# Patient Record
Sex: FEMALE | Race: WHITE | URBAN - METROPOLITAN AREA
[De-identification: names, ages, dates, MRNs, and addresses within clinical notes are randomized per-mention and may not be internally consistent; named-entity substitution may affect disease eponyms.]

---

## 2019-01-22 NOTE — PROGRESS NOTES
GISELA Che Kidney is a 63y.o. year old female patient of No primary care provider on file. who presents with c/o est care. Pt has history of has Diabetes ([address] HTN (hypertension), and Hypercholesteremia on their problem list..    Was previously following with provider in [place]. Diabetic ROS   Current mediation regimen:  medication compliance/ADR: metformin 1000 BID, glipizide 5mg BID, has been on this regimen since dx in 2010  home glucose monitoring: BS runs in 120s fasting, checks 3 times a week. diabetic ROS: Denies lightheadedness, feeling jittery or irritable. Denies numbness/tingling in extremities. Concerned about risk for dementia with metformin. HTN  Home BP: runs 163K systolic  Medication regimen/ADR/missed doses: carvdeilol 3.125 BID, was on lisinopril and HCTZ in the past    Hyperlipidemia   Patient has history of hyperlipidemia that is being managed with Crestor 10mg. She has been taking her statin cholesterol medication regularly without side effects such as myalgias or upper abdominal pain, nausea or jaundice. Went through period of depression with daughter who has bipolar and passing of her father in last few years. Denies feeling down or depressed. Denies SI/HI. Lifestyle  Diet: Working on getting diet back on track, gained some weight during depression. Tries to eat fruits, fresh veggies, whole grains, lots of water, no soda or gatorade, no sugar in drinks. Fried and salty foods every now and then. Exercise: started working out again, does stuff at home, goal is 5 days a week   ETOH: Denies  Nicotine: Denies  Contraception:      Denies chest pain, chest pressure, or palpitations. Denies SOB, orthopnea, or PND. Denies lower extremity swelling. Denies HA, dizziness, or blurred vision. Denies N/V/D, constipation, heartburn, or abd pain. Denies BRBPR, melena, or blood in urine. Denies difficulty sleeping.             Patient Active Problem List   Diagnosis Code  Diabetes ([facility] E11.9    HTN (hypertension) I10    Hypercholesteremia E78.00     Past Medical History:   Diagnosis Date    Diabetes ([facility]     HTN (hypertension)     Hypercholesteremia      No past surgical history on file. Social History     Socioeconomic History    Marital status:      Spouse name: Not on file    Number of children: Not on file    Years of education: Not on file    Highest education level: Not on file   Tobacco Use    Smoking status: Former Smoker     Packs/day: 1.00     Types: Cigarettes     Last attempt to quit:      Years since quittin.0    Smokeless tobacco: Never Used   Substance and Sexual Activity    Alcohol use: No     Frequency: Never     Comment: quit in [facility] Drug use: No    Sexual activity: Yes     Comment: menopausal   Social History Narrative    Works at [employer].  and lives with spouse. Family History   Problem Relation Age of Onset    Hypertension Mother     Cancer Father         lung    Heart Disease Father     Hypertension Father     Parkinson's Disease Father     Dementia Father     Bipolar Disorder Other         daughter     No Known Allergies    MEDICATIONS  Current Outpatient Medications   Medication Sig    multivitamin (ONE A DAY) tablet Take 1 Tab by mouth daily.  metFORMIN (GLUCOPHAGE) 1,000 mg tablet Take 1,000 mg by mouth two (2) times daily (with meals).  carvedilol (COREG) 3.125 mg tablet Take  by mouth two (2) times daily (with meals).  rosuvastatin (CRESTOR) 10 mg tablet Take 10 mg by mouth nightly.  glipiZIDE (GLUCOTROL) 5 mg tablet Take  by mouth two (2) times a day. No current facility-administered medications for this visit.         REVIEW OF SYSTEMS  Per HPI        Visit Vitals  /80   Pulse 87   Temp 98.1 °F (36.7 °C) (Oral)   Resp 18   Ht 5' 8\" (1.727 m)   Wt 321 lb 3.2 oz (145.7 kg)   LMP  (LMP Unknown)   SpO2 96%   BMI 48.84 kg/m²         General: Well-developed, well-nourished. In no distress. A&O x 3. Head: Normocephalic, atraumatic. Eyes: Conjunctiva clear. Pupils equal, round, reactive to light. Extraocular movements intact. Ears/Nose: Nares normal. Septum midline. Normal nasal mucosa. No drainage or sinus tenderness. Mouth/Throat: Lips, mucosa, and tongue normal. Oropharynx benign. Neck: Supple, symmetrical, trachea midline, no lymphadenopathy, no carotid bruits, no JVD, thyroid: not enlarged, symmetric, no tenderness/mass/nodules. Lungs: Clear to auscultation bilaterally. No crackles or wheezes. No use of accessory muscles. Speaks in full sentences without SOB. Chest Wall: No tenderness or deformity. Heart: RRR, normal S1 and S2, no murmur, click, rub, or gallop. Skin: No rashes or lesions. Neurovasc: No edema appreciated. Dorsalis pedis pulses are 2+ on the right side, and 2+ on the left side. Posterior tibial pulses are 2+ on the right side, and 2+ on the left side. Musculoskeletal: Gait normal.   Psychiatric: Normal mood and affect. Behavior is normal.       ASSESSMENT and PLAN  Diagnoses and all orders for this visit:    1. Establishing care with new doctor, encounter for  -     TSH RFX ON ABNORMAL TO FREE T4    2. Type 2 diabetes mellitus with complication, without long-term current use of insulin (HCC)  -     HEMOGLOBIN A1C WITH EAG  -     metFORMIN (GLUCOPHAGE) 1,000 mg tablet; Take 1 Tab by mouth two (2) times daily (with meals). -     glipiZIDE (GLUCOTROL) 5 mg tablet; Take 1 Tab by mouth two (2) times a day. 3. Essential hypertension  -     METABOLIC PANEL, COMPREHENSIVE  -     lisinopril (PRINIVIL, ZESTRIL) 10 mg tablet; Take 1 Tab by mouth daily. -     carvedilol (COREG) 3.125 mg tablet; Take 1 Tab by mouth two (2) times daily (with meals). -HTN uncontrolled, restart lisinopril, CMP today, re-eval BP in 1 month, encouraged pt to take home BP- call if abnormally low or high    4.  Hypercholesteremia  -     LIPID PANEL  -     rosuvastatin (CRESTOR) 10 mg tablet; Take 1 Tab by mouth nightly. 5. Class 3 severe obesity without serious comorbidity with body mass index (BMI) of 45.0 to 49.9 in adult, unspecified obesity type ([address]  Discussed imp of routine exercise and health diet. Pt is motivated. Plans to workout 5 days a week at home. 6. Screening for breast cancer  -     TERENCE MAMMO BI SCREENING INCL CAD; Future    7. Need for hepatitis C screening test  -     HEPATITIS C AB, RFLX TO QT BY PCR    8. Encounter for immunization  -     INFLUENZA VIRUS VAC QUAD,SPLIT,PRESV FREE SYRINGE IM            Patient Instructions     Please bring blood pressure and blood sugar logs to next appt. Vaccine Information Statement    Influenza (Flu) Vaccine (Inactivated or Recombinant): What you need to know    Many Vaccine Information Statements are available in [place] and other languages. See www.immunize.org/vis  Hojas de Información Sobre Vacunas están disponibles en Español y en muchos otros idiomas. Visite www.immunize.org/vis    1. Why get vaccinated? Influenza (flu) is a contagious disease that spreads around the [facility] every year, usually between October and May. Flu is caused by influenza viruses, and is spread mainly by coughing, sneezing, and close contact. Anyone can get flu. Flu strikes suddenly and can last several days. Symptoms vary by age, but can include:   fever/chills   sore throat   muscle aches   fatigue   cough   headache    runny or stuffy nose    Flu can also lead to pneumonia and blood infections, and cause diarrhea and seizures in children. If you have a medical condition, such as heart or lung disease, flu can make it worse. Flu is more dangerous for some people. Infants and young children, people 72years of age and older, pregnant women, and people with certain health conditions or a weakened immune system are at greatest risk.       Each year thousands of people in the [facility] [place] die from flu, and many more are hospitalized. Flu vaccine can:   keep you from getting flu,   make flu less severe if you do get it, and   keep you from spreading flu to your family and other people. 2. Inactivated and recombinant flu vaccines    A dose of flu vaccine is recommended every flu season. Children 6 months through 6years of age may need two doses during the same flu season. Everyone else needs only one dose each flu season. Some inactivated flu vaccines contain a very small amount of a mercury-based preservative called thimerosal. Studies have not shown thimerosal in vaccines to be harmful, but flu vaccines that do not contain thimerosal are available. There is no live flu virus in flu shots. They cannot cause the flu. There are many flu viruses, and they are always changing. Each year a new flu vaccine is made to protect against three or four viruses that are likely to cause disease in the upcoming flu season. But even when the vaccine doesnt exactly match these viruses, it may still provide some protection    Flu vaccine cannot prevent:   flu that is caused by a virus not covered by the vaccine, or   illnesses that look like flu but are not. It takes about 2 weeks for protection to develop after vaccination, and protection lasts through the flu season. 3. Some people should not get this vaccine    Tell the person who is giving you the vaccine:     If you have any severe, life-threatening allergies. If you ever had a life-threatening allergic reaction after a dose of flu vaccine, or have a severe allergy to any part of this vaccine, you may be advised not to get vaccinated. Most, but not all, types of flu vaccine contain a small amount of egg protein.  If you ever had Guillain-Barré Syndrome (also called GBS). Some people with a history of GBS should not get this vaccine. This should be discussed with your doctor.      If you are not feeling well. It is usually okay to get flu vaccine when you have a mild illness, but you might be asked to come back when you feel better. 4. Risks of a vaccine reaction    With any medicine, including vaccines, there is a chance of reactions. These are usually mild and go away on their own, but serious reactions are also possible. Most people who get a flu shot do not have any problems with it. Minor problems following a flu shot include:    soreness, redness, or swelling where the shot was given     hoarseness   sore, red or itchy eyes   cough   fever   aches   headache   itching   fatigue  If these problems occur, they usually begin soon after the shot and last 1 or 2 days. More serious problems following a flu shot can include the following:     There may be a small increased risk of Guillain-Barré Syndrome (GBS) after inactivated flu vaccine. This risk has been estimated at 1 or 2 additional cases per million people vaccinated. This is much lower than the risk of severe complications from flu, which can be prevented by flu vaccine.  Young children who get the flu shot along with pneumococcal vaccine (PCV13) and/or DTaP vaccine at the same time might be slightly more likely to have a seizure caused by fever. Ask your doctor for more information. Tell your doctor if a child who is getting flu vaccine has ever had a seizure. Problems that could happen after any injected vaccine:      People sometimes faint after a medical procedure, including vaccination. Sitting or lying down for about 15 minutes can help prevent fainting, and injuries caused by a fall. Tell your doctor if you feel dizzy, or have vision changes or ringing in the ears.  Some people get severe pain in the shoulder and have difficulty moving the arm where a shot was given. This happens very rarely.  Any medication can cause a severe allergic reaction.  Such reactions from a vaccine are very rare, estimated at about 1 in a million doses, and would happen within a few minutes to a few hours after the vaccination. As with any medicine, there is a very remote chance of a vaccine causing a serious injury or death. The safety of vaccines is always being monitored. For more information, visit: www.cdc.gov/vaccinesafety/    5. What if there is a serious reaction? What should I look for?  Look for anything that concerns you, such as signs of a severe allergic reaction, very high fever, or unusual behavior. Signs of a severe allergic reaction can include hives, swelling of the face and throat, difficulty breathing, a fast heartbeat, dizziness, and weakness - usually within a few minutes to a few hours after the vaccination. What should I do?  If you think it is a severe allergic reaction or other emergency that cant wait, call 9-1-1 and get the person to the nearest hospital. Otherwise, call your doctor.  Reactions should be reported to the Vaccine Adverse Event Reporting System (VAERS). Your doctor should file this report, or you can do it yourself through  the VAERS web site at www.vaers. [facility].gov, or by calling 3-340.470.6652. VAERS does not give medical advice. 6. The National Vaccine Injury Compensation Program    The [facility] Vaccine Injury Compensation Program (VICP) is a federal program that was created to compensate people who may have been injured by certain vaccines. Persons who believe they may have been injured by a vaccine can learn about the program and about filing a claim by calling 4-220.722.9150 or visiting the [employer] website at www.[facility].gov/vaccinecompensation. There is a time limit to file a claim for compensation. 7. How can I learn more?  Ask your healthcare provider. He or she can give you the vaccine package insert or suggest other sources of information.  Call your local or state health department.    Contact the Centers for Disease Control and Prevention (CDC):  - Call 8-135.594.8083 (1-800-CDC-INFO) or  - Visit CDCs website at www.cdc.gov/flu    Vaccine Information Statement   Inactivated Influenza Vaccine   8/7/2015  Steve Melendez [id number]    Department of Health and Human Services  Centers for Disease Control and Prevention    Office Use Only       Learning About Diabetes Food Guidelines  Your Care Instructions    Meal planning is important to manage diabetes. It helps keep your blood sugar at a target level (which you set with your doctor). You don't have to eat special foods. You can eat what your family eats, including sweets once in a while. But you do have to pay attention to how often you eat and how much you eat of certain foods. You may want to work with a dietitian or a certified diabetes educator (CDE) to help you plan meals and snacks. A dietitian or CDE can also help you lose weight if that is one of your goals. What should you know about eating carbs? Managing the amount of carbohydrate (carbs) you eat is an important part of healthy meals when you have diabetes. Carbohydrate is found in many foods. · Learn which foods have carbs. And learn the amounts of carbs in different foods. ? Bread, cereal, pasta, and rice have about 15 grams of carbs in a serving. A serving is 1 slice of bread (1 ounce), ½ cup of cooked cereal, or 1/3 cup of cooked pasta or rice. ? Fruits have 15 grams of carbs in a serving. A serving is 1 small fresh fruit, such as an apple or orange; ½ of a banana; ½ cup of cooked or canned fruit; ½ cup of fruit juice; 1 cup of melon or raspberries; or 2 tablespoons of dried fruit. ? Milk and no-sugar-added yogurt have 15 grams of carbs in a serving. A serving is 1 cup of milk or 2/3 cup of no-sugar-added yogurt. ? Starchy vegetables have 15 grams of carbs in a serving.  A serving is ½ cup of mashed potatoes or sweet potato; 1 cup winter squash; ½ of a small baked potato; ½ cup of cooked beans; or ½ cup cooked corn or green peas.  · Learn how much carbs to eat each day and at each meal. A dietitian or CDE can teach you how to keep track of the amount of carbs you eat. This is called carbohydrate counting. · If you are not sure how to count carbohydrate grams, use the Plate Method to plan meals. It is a good, quick way to make sure that you have a balanced meal. It also helps you spread carbs throughout the day. ? Divide your plate by types of foods. Put non-starchy vegetables on half the plate, meat or other protein food on one-quarter of the plate, and a grain or starchy vegetable in the final quarter of the plate. To this you can add a small piece of fruit and 1 cup of milk or yogurt, depending on how many carbs you are supposed to eat at a meal.  · Try to eat about the same amount of carbs at each meal. Do not \"save up\" your daily allowance of carbs to eat at one meal.  · Proteins have very little or no carbs per serving. Examples of proteins are beef, chicken, turkey, fish, eggs, tofu, cheese, cottage cheese, and peanut butter. A serving size of meat is 3 ounces, which is about the size of a deck of cards. Examples of meat substitute serving sizes (equal to 1 ounce of meat) are 1/4 cup of cottage cheese, 1 egg, 1 tablespoon of peanut butter, and ½ cup of tofu. How can you eat out and still eat healthy? · Learn to estimate the serving sizes of foods that have carbohydrate. If you measure food at home, it will be easier to estimate the amount in a serving of restaurant food. · If the meal you order has too much carbohydrate (such as potatoes, corn, or baked beans), ask to have a low-carbohydrate food instead. Ask for a salad or green vegetables. · If you use insulin, check your blood sugar before and after eating out to help you plan how much to eat in the future. · If you eat more carbohydrate at a meal than you had planned, take a walk or do other exercise. This will help lower your blood sugar.   What else should you know?  · Limit saturated fat, such as the fat from meat and dairy products. This is a healthy choice because people who have diabetes are at higher risk of heart disease. So choose lean cuts of meat and nonfat or low-fat dairy products. Use olive or canola oil instead of butter or shortening when cooking. · Don't skip meals. Your blood sugar may drop too low if you skip meals and take insulin or certain medicines for diabetes. · Check with your doctor before you drink alcohol. Alcohol can cause your blood sugar to drop too low. Alcohol can also cause a bad reaction if you take certain diabetes medicines. Follow-up care is a key part of your treatment and safety. Be sure to make and go to all appointments, and call your doctor if you are having problems. It's also a good idea to know your test results and keep a list of the medicines you take. Where can you learn more? Go to http://niles-zach.info/. Enter G672 in the search box to learn more about \"Learning About Diabetes Food Guidelines. \"  Current as of: July 25, 2018  Content Version: 11.9  © 4780-7153 [employer]. Care instructions adapted under license by [employer] (which disclaims liability or warranty for this information). If you have questions about a medical condition or this instruction, always ask your healthcare professional. [address] any warranty or liability for your use of this information. High Blood Pressure: Care Instructions  Overview    It's normal for blood pressure to go up and down throughout the day. But if it stays up, you have high blood pressure. Another name for high blood pressure is hypertension. Despite what a lot of people think, high blood pressure usually doesn't cause headaches or make you feel dizzy or lightheaded. It usually has no symptoms. But it does increase your risk of stroke, heart attack, and other problems.  You and your doctor will talk about your risks of these problems based on your blood pressure. Your doctor will give you a goal for your blood pressure. Your goal will be based on your health and your age. Lifestyle changes, such as eating healthy and being active, are always important to help lower blood pressure. You might also take medicine to reach your blood pressure goal.  Follow-up care is a key part of your treatment and safety. Be sure to make and go to all appointments, and call your doctor if you are having problems. It's also a good idea to know your test results and keep a list of the medicines you take. How can you care for yourself at home? Medical treatment  · If you stop taking your medicine, your blood pressure will go back up. You may take one or more types of medicine to lower your blood pressure. Be safe with medicines. Take your medicine exactly as prescribed. Call your doctor if you think you are having a problem with your medicine. · Talk to your doctor before you start taking aspirin every day. Aspirin can help certain people lower their risk of a heart attack or stroke. But taking aspirin isn't right for everyone, because it can cause serious bleeding. · See your doctor regularly. You may need to see the doctor more often at first or until your blood pressure comes down. · If you are taking blood pressure medicine, talk to your doctor before you take decongestants or anti-inflammatory medicine, such as ibuprofen. Some of these medicines can raise blood pressure. · Learn how to check your blood pressure at home. Lifestyle changes  · Stay at a healthy weight. This is especially important if you put on weight around the waist. Losing even 10 pounds can help you lower your blood pressure. · If your doctor recommends it, get more exercise. Walking is a good choice. Bit by bit, increase the amount you walk every day. Try for at least 30 minutes on most days of the week.  You also may want to swim, bike, or do other activities. · Avoid or limit alcohol. Talk to your doctor about whether you can drink any alcohol. · Try to limit how much sodium you eat to less than 2,300 milligrams (mg) a day. Your doctor may ask you to try to eat less than 1,500 mg a day. · Eat plenty of fruits (such as bananas and oranges), vegetables, legumes, whole grains, and low-fat dairy products. · Lower the amount of saturated fat in your diet. Saturated fat is found in animal products such as milk, cheese, and meat. Limiting these foods may help you lose weight and also lower your risk for heart disease. · Do not smoke. Smoking increases your risk for heart attack and stroke. If you need help quitting, talk to your doctor about stop-smoking programs and medicines. These can increase your chances of quitting for good. When should you call for help? Call 911 anytime you think you may need emergency care. This may mean having symptoms that suggest that your blood pressure is causing a serious heart or blood vessel problem. Your blood pressure may be over 180/120.   For example, call 911 if:    · You have symptoms of a heart attack. These may include:  ? Chest pain or pressure, or a strange feeling in the chest.  ? Sweating. ? Shortness of breath. ? Nausea or vomiting. ? Pain, pressure, or a strange feeling in the back, neck, jaw, or upper belly or in one or both shoulders or arms. ? Lightheadedness or sudden weakness. ? A fast or irregular heartbeat.     · You have symptoms of a stroke. These may include:  ? Sudden numbness, tingling, weakness, or loss of movement in your face, arm, or leg, especially on only one side of your body. ? Sudden vision changes. ? Sudden trouble speaking. ? Sudden confusion or trouble understanding simple statements. ? Sudden problems with walking or balance.   ? A sudden, severe headache that is different from past headaches.     · You have severe back or belly pain.    Do not wait until your blood pressure comes down on its own. Get help right away.   Call your doctor now or seek immediate care if:    · Your blood pressure is much higher than normal (such as 180/120 or higher), but you don't have symptoms.     · You think high blood pressure is causing symptoms, such as:  ? Severe headache.  ? Blurry vision.    Watch closely for changes in your health, and be sure to contact your doctor if:    · Your blood pressure measures higher than your doctor recommends at least 2 times. That means the top number is higher or the bottom number is higher, or both.     · You think you may be having side effects from your blood pressure medicine. Where can you learn more? Go to http://niles-zach.info/. Enter L607 in the search box to learn more about \"High Blood Pressure: Care Instructions. \"  Current as of: July 22, 2018  Content Version: 11.9  © 3523-5709 [employer]. Care instructions adapted under license by [employer] (which disclaims liability or warranty for this information). If you have questions about a medical condition or this instruction, always ask your healthcare professional. [place] [id number] any warranty or liability for your use of this information. Follow-up Disposition:  Return in about 1 month (around 2/25/2019), or if symptoms worsen or fail to improve, for HTN, DM. Health Maintenance Due   Topic Date Due    Hepatitis C Screening  1960    DTaP/Tdap/Td series (1 - Tdap) 02/09/1982    PAP AKA CERVICAL CYTOLOGY  02/09/1982    Shingrix Vaccine Age 50> (1 of 2) 02/09/2011    BREAST CANCER SCRN MAMMOGRAM  02/09/2011    FOBT Q 1 YEAR AGE 50-75  02/09/2011    Influenza Age 9 to Adult  08/01/2018   Flu shot today. Last mammogram 1999. Last colonoscopy 2 years ago in [facility]. Last PAP 2012 Dr. Anthony Chew in [facility]. Pt to schedule f/u with me for PAP only.       I have discussed the diagnosis with the patient and the intended plan as seen in the above orders. Patient is in agreement. The patient has received an after-visit summary and questions were answered concerning future plans. I have discussed medication side effects and warnings with the patient as well. Warning signs for the above conditions were discussed including when to call our office or go to the emergency room. The nurse provided the patient and/or family with advanced directive information if needed and encouraged the patient to provide a copy to the office when available.

## 2019-01-25 ENCOUNTER — OFFICE VISIT (OUTPATIENT)
Dept: INTERNAL MEDICINE CLINIC | Facility: CLINIC | Age: 59
End: 2019-01-25

## 2019-01-25 VITALS
WEIGHT: 293 LBS | TEMPERATURE: 98.1 F | RESPIRATION RATE: 18 BRPM | BODY MASS INDEX: 44.41 KG/M2 | SYSTOLIC BLOOD PRESSURE: 150 MMHG | HEART RATE: 87 BPM | DIASTOLIC BLOOD PRESSURE: 80 MMHG | HEIGHT: 68 IN | OXYGEN SATURATION: 96 %

## 2019-01-25 DIAGNOSIS — E66.01 CLASS 3 SEVERE OBESITY WITHOUT SERIOUS COMORBIDITY WITH BODY MASS INDEX (BMI) OF 45.0 TO 49.9 IN ADULT, UNSPECIFIED OBESITY TYPE (HCC): ICD-10-CM

## 2019-01-25 DIAGNOSIS — E78.00 HYPERCHOLESTEREMIA: ICD-10-CM

## 2019-01-25 DIAGNOSIS — Z23 ENCOUNTER FOR IMMUNIZATION: ICD-10-CM

## 2019-01-25 DIAGNOSIS — Z11.59 NEED FOR HEPATITIS C SCREENING TEST: ICD-10-CM

## 2019-01-25 DIAGNOSIS — Z12.39 SCREENING FOR BREAST CANCER: ICD-10-CM

## 2019-01-25 DIAGNOSIS — Z76.89 ESTABLISHING CARE WITH NEW DOCTOR, ENCOUNTER FOR: Primary | ICD-10-CM

## 2019-01-25 DIAGNOSIS — I10 ESSENTIAL HYPERTENSION: ICD-10-CM

## 2019-01-25 DIAGNOSIS — E11.8 TYPE 2 DIABETES MELLITUS WITH COMPLICATION, WITHOUT LONG-TERM CURRENT USE OF INSULIN (HCC): ICD-10-CM

## 2019-01-25 RX ORDER — METFORMIN HYDROCHLORIDE 1000 MG/1
1000 TABLET ORAL 2 TIMES DAILY WITH MEALS
Qty: 180 TAB | Refills: 5 | Status: SHIPPED | OUTPATIENT
Start: 2019-01-25 | End: 2020-04-20

## 2019-01-25 RX ORDER — LISINOPRIL 10 MG/1
10 TABLET ORAL DAILY
Qty: 90 TAB | Refills: 2 | Status: SHIPPED | OUTPATIENT
Start: 2019-01-25 | End: 2019-04-25 | Stop reason: SDUPTHER

## 2019-01-25 RX ORDER — CARVEDILOL 3.12 MG/1
3.12 TABLET ORAL 2 TIMES DAILY WITH MEALS
Qty: 180 TAB | Refills: 5 | Status: SHIPPED | OUTPATIENT
Start: 2019-01-25 | End: 2020-02-10

## 2019-01-25 RX ORDER — GLIPIZIDE 5 MG/1
5 TABLET ORAL 2 TIMES DAILY
Qty: 180 TAB | Refills: 5 | Status: SHIPPED | OUTPATIENT
Start: 2019-01-25 | End: 2020-03-02

## 2019-01-25 RX ORDER — GLIPIZIDE 5 MG/1
TABLET ORAL 2 TIMES DAILY
COMMUNITY
End: 2019-01-25 | Stop reason: SDUPTHER

## 2019-01-25 RX ORDER — METFORMIN HYDROCHLORIDE 1000 MG/1
1000 TABLET ORAL 2 TIMES DAILY WITH MEALS
COMMUNITY
End: 2019-01-25 | Stop reason: SDUPTHER

## 2019-01-25 RX ORDER — ROSUVASTATIN CALCIUM 10 MG/1
10 TABLET, COATED ORAL
Qty: 90 TAB | Refills: 5 | Status: SHIPPED | OUTPATIENT
Start: 2019-01-25 | End: 2020-04-20 | Stop reason: DRUGHIGH

## 2019-01-25 RX ORDER — BISMUTH SUBSALICYLATE 262 MG
1 TABLET,CHEWABLE ORAL DAILY
COMMUNITY
End: 2021-08-03

## 2019-01-25 RX ORDER — CARVEDILOL 3.12 MG/1
TABLET ORAL 2 TIMES DAILY WITH MEALS
COMMUNITY
End: 2019-01-25 | Stop reason: SDUPTHER

## 2019-01-25 RX ORDER — ROSUVASTATIN CALCIUM 10 MG/1
10 TABLET, COATED ORAL
COMMUNITY
End: 2019-01-25 | Stop reason: SDUPTHER

## 2019-01-25 NOTE — PROGRESS NOTES
Renetta Loja  Identified pt with two pt identifiers(name and ). Chief Complaint   Patient presents with    Establish Care    Medication Evaluation    Immunization/Injection       Reviewed record In preparation for visit and have obtained necessary documentation. Given info on advanced directives. 1. Have you been to the ER, urgent care clinic or hospitalized since your last visit? No     2. Have you seen or consulted any other health care providers outside of the [address] [place] [address] since your last visit? Include any pap smears or colon screening. No    Vitals reviewed with provider. Health Maintenance reviewed: After verbal order read back of MOLLY Pierson, patient received Flu Shot in left arm,  [address]  [id number] Lot  Exp 19. Patient tolerated procedure without complaints and received VIS. Health Maintenance Due   Topic    Hepatitis C Screening     DTaP/Tdap/Td series (1 - Tdap)    PAP AKA CERVICAL CYTOLOGY     Shingrix Vaccine Age 50> (1 of 2)    BREAST CANCER SCRN MAMMOGRAM     FOBT Q 1 YEAR AGE 50-75     Influenza Age 5 to Adult           Wt Readings from Last 3 Encounters:   19 321 lb 3.2 oz (145.7 kg)        Temp Readings from Last 3 Encounters:   19 98.1 °F (36.7 °C) (Oral)        BP Readings from Last 3 Encounters:   19 176/85        Pulse Readings from Last 3 Encounters:   19 87        Vitals:    19 1037   BP: 176/85   Pulse: 87   Resp: 18   Temp: 98.1 °F (36.7 °C)   TempSrc: Oral   SpO2: 96%   Weight: 321 lb 3.2 oz (145.7 kg)   Height: 5' 8\" (1.727 m)   PainSc:   0 - No pain          Learning Assessment:   :     No flowsheet data found. Depression Screening:   :       PHQ over the last two weeks 2019   Little interest or pleasure in doing things Not at all   Feeling down, depressed, irritable, or hopeless Not at all   Total Score PHQ 2 0        Fall Risk Assessment:   :     No flowsheet data found.      Abuse Screening:   : No flowsheet data found. ADL Screening:   :     No flowsheet data found.

## 2019-01-25 NOTE — PATIENT INSTRUCTIONS
Please bring blood pressure and blood sugar logs to next appt. Vaccine Information Statement    Influenza (Flu) Vaccine (Inactivated or Recombinant): What you need to know    Many Vaccine Information Statements are available in Kyr[place] and other languages. See www.immunize.org/vis  Hojas de Información Sobre Vacunas están disponibles en Español y en muchos otros idiomas. Visite www.immunize.org/vis    1. Why get vaccinated? Influenza (flu) is a contagious disease that spreads around the [facility] every year, usually between October and May. Flu is caused by influenza viruses, and is spread mainly by coughing, sneezing, and close contact. Anyone can get flu. Flu strikes suddenly and can last several days. Symptoms vary by age, but can include:   fever/chills   sore throat   muscle aches   fatigue   cough   headache    runny or stuffy nose    Flu can also lead to pneumonia and blood infections, and cause diarrhea and seizures in children. If you have a medical condition, such as heart or lung disease, flu can make it worse. Flu is more dangerous for some people. Infants and young children, people 72years of age and older, pregnant women, and people with certain health conditions or a weakened immune system are at greatest risk. Each year thousands of people in the [facility] die from flu, and many more are hospitalized. Flu vaccine can:   keep you from getting flu,   make flu less severe if you do get it, and   keep you from spreading flu to your family and other people. 2. Inactivated and recombinant flu vaccines    A dose of flu vaccine is recommended every flu season. Children 6 months through 6years of age may need two doses during the same flu season. Everyone else needs only one dose each flu season.        Some inactivated flu vaccines contain a very small amount of a mercury-based preservative called thimerosal. Studies have not shown thimerosal in vaccines to be harmful, but flu vaccines that do not contain thimerosal are available. There is no live flu virus in flu shots. They cannot cause the flu. There are many flu viruses, and they are always changing. Each year a new flu vaccine is made to protect against three or four viruses that are likely to cause disease in the upcoming flu season. But even when the vaccine doesnt exactly match these viruses, it may still provide some protection    Flu vaccine cannot prevent:   flu that is caused by a virus not covered by the vaccine, or   illnesses that look like flu but are not. It takes about 2 weeks for protection to develop after vaccination, and protection lasts through the flu season. 3. Some people should not get this vaccine    Tell the person who is giving you the vaccine:     If you have any severe, life-threatening allergies. If you ever had a life-threatening allergic reaction after a dose of flu vaccine, or have a severe allergy to any part of this vaccine, you may be advised not to get vaccinated. Most, but not all, types of flu vaccine contain a small amount of egg protein.  If you ever had Guillain-Barré Syndrome (also called GBS). Some people with a history of GBS should not get this vaccine. This should be discussed with your doctor.  If you are not feeling well. It is usually okay to get flu vaccine when you have a mild illness, but you might be asked to come back when you feel better. 4. Risks of a vaccine reaction    With any medicine, including vaccines, there is a chance of reactions. These are usually mild and go away on their own, but serious reactions are also possible. Most people who get a flu shot do not have any problems with it.      Minor problems following a flu shot include:    soreness, redness, or swelling where the shot was given     hoarseness   sore, red or itchy eyes   cough   fever   aches   headache   itching   fatigue  If these problems occur, they usually begin soon after the shot and last 1 or 2 days. More serious problems following a flu shot can include the following:     There may be a small increased risk of Guillain-Barré Syndrome (GBS) after inactivated flu vaccine. This risk has been estimated at 1 or 2 additional cases per million people vaccinated. This is much lower than the risk of severe complications from flu, which can be prevented by flu vaccine.  Young children who get the flu shot along with pneumococcal vaccine (PCV13) and/or DTaP vaccine at the same time might be slightly more likely to have a seizure caused by fever. Ask your doctor for more information. Tell your doctor if a child who is getting flu vaccine has ever had a seizure. Problems that could happen after any injected vaccine:      People sometimes faint after a medical procedure, including vaccination. Sitting or lying down for about 15 minutes can help prevent fainting, and injuries caused by a fall. Tell your doctor if you feel dizzy, or have vision changes or ringing in the ears.  Some people get severe pain in the shoulder and have difficulty moving the arm where a shot was given. This happens very rarely.  Any medication can cause a severe allergic reaction. Such reactions from a vaccine are very rare, estimated at about 1 in a million doses, and would happen within a few minutes to a few hours after the vaccination. As with any medicine, there is a very remote chance of a vaccine causing a serious injury or death. The safety of vaccines is always being monitored. For more information, visit: www.cdc.gov/vaccinesafety/    5. What if there is a serious reaction? What should I look for?  Look for anything that concerns you, such as signs of a severe allergic reaction, very high fever, or unusual behavior.     Signs of a severe allergic reaction can include hives, swelling of the face and throat, difficulty breathing, a fast heartbeat, dizziness, and weakness - usually within a few minutes to a few hours after the vaccination. What should I do?  If you think it is a severe allergic reaction or other emergency that cant wait, call 9-1-1 and get the person to the nearest hospital. Otherwise, call your doctor.  Reactions should be reported to the Vaccine Adverse Event Reporting System (VAERS). Your doctor should file this report, or you can do it yourself through  the VAERS web site at www.vaers. [facility].gov, or by calling 8-908.988.9414. VAERS does not give medical advice. 6. The National Vaccine Injury Compensation Program    The [facility] Vaccine Injury Compensation Program (VICP) is a federal program that was created to compensate people who may have been injured by certain vaccines. Persons who believe they may have been injured by a vaccine can learn about the program and about filing a claim by calling 0-595.316.7255 or visiting the [employer] website at www.[facility].gov/vaccinecompensation. There is a time limit to file a claim for compensation. 7. How can I learn more?  Ask your healthcare provider. He or she can give you the vaccine package insert or suggest other sources of information.  Call your local or state health department.  Contact the Centers for Disease Control and Prevention (CDC):  - Call 2-468.215.3080 (1-800-CDC-INFO) or  - Visit CDCs website at www.cdc.gov/flu    Vaccine Information Statement   Inactivated Influenza Vaccine   8/7/2015  42 MAXI Morales [id number]    Department of Health and Human Services  Centers for Disease Control and Prevention    Office Use Only       Learning About Diabetes Food Guidelines  Your Care Instructions    Meal planning is important to manage diabetes. It helps keep your blood sugar at a target level (which you set with your doctor). You don't have to eat special foods. You can eat what your family eats, including sweets once in a while.  But you do have to pay attention to how often you eat and how much you eat of certain foods. You may want to work with a dietitian or a certified diabetes educator (CDE) to help you plan meals and snacks. A dietitian or CDE can also help you lose weight if that is one of your goals. What should you know about eating carbs? Managing the amount of carbohydrate (carbs) you eat is an important part of healthy meals when you have diabetes. Carbohydrate is found in many foods. · Learn which foods have carbs. And learn the amounts of carbs in different foods. ? Bread, cereal, pasta, and rice have about 15 grams of carbs in a serving. A serving is 1 slice of bread (1 ounce), ½ cup of cooked cereal, or 1/3 cup of cooked pasta or rice. ? Fruits have 15 grams of carbs in a serving. A serving is 1 small fresh fruit, such as an apple or orange; ½ of a banana; ½ cup of cooked or canned fruit; ½ cup of fruit juice; 1 cup of melon or raspberries; or 2 tablespoons of dried fruit. ? Milk and no-sugar-added yogurt have 15 grams of carbs in a serving. A serving is 1 cup of milk or 2/3 cup of no-sugar-added yogurt. ? Starchy vegetables have 15 grams of carbs in a serving. A serving is ½ cup of mashed potatoes or sweet potato; 1 cup winter squash; ½ of a small baked potato; ½ cup of cooked beans; or ½ cup cooked corn or green peas. · Learn how much carbs to eat each day and at each meal. A dietitian or CDE can teach you how to keep track of the amount of carbs you eat. This is called carbohydrate counting. · If you are not sure how to count carbohydrate grams, use the Plate Method to plan meals. It is a good, quick way to make sure that you have a balanced meal. It also helps you spread carbs throughout the day. ? Divide your plate by types of foods. Put non-starchy vegetables on half the plate, meat or other protein food on one-quarter of the plate, and a grain or starchy vegetable in the final quarter of the plate.  To this you can add a small piece of fruit and 1 cup of milk or yogurt, depending on how many carbs you are supposed to eat at a meal.  · Try to eat about the same amount of carbs at each meal. Do not \"save up\" your daily allowance of carbs to eat at one meal.  · Proteins have very little or no carbs per serving. Examples of proteins are beef, chicken, turkey, fish, eggs, tofu, cheese, cottage cheese, and peanut butter. A serving size of meat is 3 ounces, which is about the size of a deck of cards. Examples of meat substitute serving sizes (equal to 1 ounce of meat) are 1/4 cup of cottage cheese, 1 egg, 1 tablespoon of peanut butter, and ½ cup of tofu. How can you eat out and still eat healthy? · Learn to estimate the serving sizes of foods that have carbohydrate. If you measure food at home, it will be easier to estimate the amount in a serving of restaurant food. · If the meal you order has too much carbohydrate (such as potatoes, corn, or baked beans), ask to have a low-carbohydrate food instead. Ask for a salad or green vegetables. · If you use insulin, check your blood sugar before and after eating out to help you plan how much to eat in the future. · If you eat more carbohydrate at a meal than you had planned, take a walk or do other exercise. This will help lower your blood sugar. What else should you know? · Limit saturated fat, such as the fat from meat and dairy products. This is a healthy choice because people who have diabetes are at higher risk of heart disease. So choose lean cuts of meat and nonfat or low-fat dairy products. Use olive or canola oil instead of butter or shortening when cooking. · Don't skip meals. Your blood sugar may drop too low if you skip meals and take insulin or certain medicines for diabetes. · Check with your doctor before you drink alcohol. Alcohol can cause your blood sugar to drop too low. Alcohol can also cause a bad reaction if you take certain diabetes medicines.   Follow-up care is a key part of your treatment and safety. Be sure to make and go to all appointments, and call your doctor if you are having problems. It's also a good idea to know your test results and keep a list of the medicines you take. Where can you learn more? Go to http://niles-zach.info/. Enter L907 in the search box to learn more about \"Learning About Diabetes Food Guidelines. \"  Current as of: July 25, 2018  Content Version: 11.9  © 4036-7984 [employer]. Care instructions adapted under license by [employer] (which disclaims liability or warranty for this information). If you have questions about a medical condition or this instruction, always ask your healthcare professional. [address] any warranty or liability for your use of this information. High Blood Pressure: Care Instructions  Overview    It's normal for blood pressure to go up and down throughout the day. But if it stays up, you have high blood pressure. Another name for high blood pressure is hypertension. Despite what a lot of people think, high blood pressure usually doesn't cause headaches or make you feel dizzy or lightheaded. It usually has no symptoms. But it does increase your risk of stroke, heart attack, and other problems. You and your doctor will talk about your risks of these problems based on your blood pressure. Your doctor will give you a goal for your blood pressure. Your goal will be based on your health and your age. Lifestyle changes, such as eating healthy and being active, are always important to help lower blood pressure. You might also take medicine to reach your blood pressure goal.  Follow-up care is a key part of your treatment and safety. Be sure to make and go to all appointments, and call your doctor if you are having problems. It's also a good idea to know your test results and keep a list of the medicines you take.   How can you care for yourself at home?  Medical treatment  · If you stop taking your medicine, your blood pressure will go back up. You may take one or more types of medicine to lower your blood pressure. Be safe with medicines. Take your medicine exactly as prescribed. Call your doctor if you think you are having a problem with your medicine. · Talk to your doctor before you start taking aspirin every day. Aspirin can help certain people lower their risk of a heart attack or stroke. But taking aspirin isn't right for everyone, because it can cause serious bleeding. · See your doctor regularly. You may need to see the doctor more often at first or until your blood pressure comes down. · If you are taking blood pressure medicine, talk to your doctor before you take decongestants or anti-inflammatory medicine, such as ibuprofen. Some of these medicines can raise blood pressure. · Learn how to check your blood pressure at home. Lifestyle changes  · Stay at a healthy weight. This is especially important if you put on weight around the waist. Losing even 10 pounds can help you lower your blood pressure. · If your doctor recommends it, get more exercise. Walking is a good choice. Bit by bit, increase the amount you walk every day. Try for at least 30 minutes on most days of the week. You also may want to swim, bike, or do other activities. · Avoid or limit alcohol. Talk to your doctor about whether you can drink any alcohol. · Try to limit how much sodium you eat to less than 2,300 milligrams (mg) a day. Your doctor may ask you to try to eat less than 1,500 mg a day. · Eat plenty of fruits (such as bananas and oranges), vegetables, legumes, whole grains, and low-fat dairy products. · Lower the amount of saturated fat in your diet. Saturated fat is found in animal products such as milk, cheese, and meat. Limiting these foods may help you lose weight and also lower your risk for heart disease. · Do not smoke.  Smoking increases your risk for heart attack and stroke. If you need help quitting, talk to your doctor about stop-smoking programs and medicines. These can increase your chances of quitting for good. When should you call for help? Call 911 anytime you think you may need emergency care. This may mean having symptoms that suggest that your blood pressure is causing a serious heart or blood vessel problem. Your blood pressure may be over 180/120.   For example, call 911 if:    · You have symptoms of a heart attack. These may include:  ? Chest pain or pressure, or a strange feeling in the chest.  ? Sweating. ? Shortness of breath. ? Nausea or vomiting. ? Pain, pressure, or a strange feeling in the back, neck, jaw, or upper belly or in one or both shoulders or arms. ? Lightheadedness or sudden weakness. ? A fast or irregular heartbeat.     · You have symptoms of a stroke. These may include:  ? Sudden numbness, tingling, weakness, or loss of movement in your face, arm, or leg, especially on only one side of your body. ? Sudden vision changes. ? Sudden trouble speaking. ? Sudden confusion or trouble understanding simple statements. ? Sudden problems with walking or balance. ? A sudden, severe headache that is different from past headaches.     · You have severe back or belly pain.    Do not wait until your blood pressure comes down on its own. Get help right away.   Call your doctor now or seek immediate care if:    · Your blood pressure is much higher than normal (such as 180/120 or higher), but you don't have symptoms.     · You think high blood pressure is causing symptoms, such as:  ? Severe headache.  ? Blurry vision.    Watch closely for changes in your health, and be sure to contact your doctor if:    · Your blood pressure measures higher than your doctor recommends at least 2 times.  That means the top number is higher or the bottom number is higher, or both.     · You think you may be having side effects from your blood pressure medicine. Where can you learn more? Go to http://niles-zach.info/. Enter R343 in the search box to learn more about \"High Blood Pressure: Care Instructions. \"  Current as of: July 22, 2018  Content Version: 11.9  © 9540-6760 [employer]. Care instructions adapted under license by [employer] (which disclaims liability or warranty for this information). If you have questions about a medical condition or this instruction, always ask your healthcare professional. [address] any warranty or liability for your use of this information.

## 2019-01-26 LAB
ALBUMIN SERPL-MCNC: 4.2 G/DL (ref 3.5–5.5)
ALBUMIN/GLOB SERPL: 1.6 {RATIO} (ref 1.2–2.2)
ALP SERPL-CCNC: 80 IU/L (ref 39–117)
ALT SERPL-CCNC: 14 IU/L (ref 0–32)
AST SERPL-CCNC: 19 IU/L (ref 0–40)
BILIRUB SERPL-MCNC: 0.4 MG/DL (ref 0–1.2)
BUN SERPL-MCNC: 12 MG/DL (ref 6–24)
BUN/CREAT SERPL: 16 (ref 9–23)
CALCIUM SERPL-MCNC: 9.8 MG/DL (ref 8.7–10.2)
CHLORIDE SERPL-SCNC: 98 MMOL/L (ref 96–106)
CHOLEST SERPL-MCNC: 194 MG/DL (ref 100–199)
CO2 SERPL-SCNC: 24 MMOL/L (ref 20–29)
CREAT SERPL-MCNC: 0.73 MG/DL (ref 0.57–1)
EST. AVERAGE GLUCOSE BLD GHB EST-MCNC: 226 MG/DL
GLOBULIN SER CALC-MCNC: 2.7 G/DL (ref 1.5–4.5)
GLUCOSE SERPL-MCNC: 210 MG/DL (ref 65–99)
HBA1C MFR BLD: 9.5 % (ref 4.8–5.6)
HCV AB S/CO SERPL IA: 0.4 S/CO RATIO (ref 0–0.9)
HDLC SERPL-MCNC: 35 MG/DL
LDLC SERPL CALC-MCNC: 117 MG/DL (ref 0–99)
POTASSIUM SERPL-SCNC: 4.4 MMOL/L (ref 3.5–5.2)
PROT SERPL-MCNC: 6.9 G/DL (ref 6–8.5)
SODIUM SERPL-SCNC: 138 MMOL/L (ref 134–144)
TRIGL SERPL-MCNC: 208 MG/DL (ref 0–149)
TSH SERPL DL<=0.005 MIU/L-ACNC: 1.85 UIU/ML (ref 0.45–4.5)
VLDLC SERPL CALC-MCNC: 42 MG/DL (ref 5–40)

## 2019-01-28 ENCOUNTER — TELEPHONE (OUTPATIENT)
Dept: INTERNAL MEDICINE CLINIC | Facility: CLINIC | Age: 59
End: 2019-01-28

## 2019-01-28 DIAGNOSIS — E11.8 TYPE 2 DIABETES MELLITUS WITH COMPLICATION, WITHOUT LONG-TERM CURRENT USE OF INSULIN (HCC): Primary | ICD-10-CM

## 2019-01-28 NOTE — PROGRESS NOTES
Pls let pt know her A1C is 9.5, the goal for diabetes is <7, I would like to add a new medication called Januvia that I will send to her pharmacy. It is a once daily medication. Pls continue to check fasting BS once daily and bring to next appt. Her cholesterol is also elevated and I would like to increase her Crestor to 20mg daily so pls take two of your current pills and I can send an Rx for the 20mg tabs once you run out. Thyroid, kidney and liver function are nml. Hep c screen is negative. Pls continue to work on healthy eating habits including reducing sugars and simple carbohydrates and aiming to get 30 mins of exercise at least 3-4 days a week.

## 2019-04-24 NOTE — PROGRESS NOTES
HPI 
Anali Jacinto is a 63y.o. year old female patient of Maggi Little NP who presents with c/o 3 mo f/u for HTN and DM. Pt has history of has Diabetes ([address] HTN (hypertension), Hypercholesteremia, and Obesity, morbid ([address] on their problem list.. Seen 3 mo ago to [facility] care. Januvia added for uncontrolled DM, and crestor added for XOL. Also c/o left ear pain last few days. Thinks it's allergy related. Sounds like something in there. Has congestion, takes flonase as needed. Denies f/c. Denies cough. HTN Home BP: doesn't check at home. Medication regimen/ADR/missed doses: carvdeilol 3.125 BID, Lisinopril 10mg added back at last appt Denies chest pain, chest pressure, or palpitations. Denies SOB, orthopnea, or PND. Denies HA, dizziness, blurred vision, or swelling. Diabetic ROS Current mediation regimen: 
medication compliance/ADR: metformin 1000 BID, glipizide 5mg BID, Januvia 100 -- Pt unable to take Januvia as it was $400. States prior to seeing me had been out of medication for a few months.  
home glucose monitorins in AM, 180s in PM.  
diabetic ROS: Denies lightheadedness, feeling jittery or irritable. Denies numbness/tingling in extremities. Diet: Improved last week. Drinks unsweetened tea. No sodas. No sugar in coffee. Has sweets every now and then. Chips and sweets are her weakness. Has met with diabetes nutritionist in past.  
Exercise: Exercising 3 times a week- walking, hand weights. Hurt her hip so couldn't exercise for awhile. Weight: no change in last 3 mos, feels like her clothes are fitting better Requesting note for air conditioning unit to be allowed in upstairs of her home, has one zone heating/air and states it gets very hot upstairs where she sleeps. Patient Active Problem List  
Diagnosis Code  Diabetes ([address] E11.9  
 HTN (hypertension) I10  
 Hypercholesteremia E78.00  Obesity, morbid ([address] E66.01 Past Medical History: Diagnosis Date  Diabetes [address]  HTN (hypertension)  Hypercholesteremia Past Surgical History:  
Procedure Laterality Date  HX  SECTION Social History Socioeconomic History  Marital status:  Spouse name: Not on file  Number of children: Not on file  Years of education: Not on file  Highest education level: Not on file Tobacco Use  Smoking status: Former Smoker Packs/day: 1.00 Types: Cigarettes Last attempt to quit:  Years since quittin.3  Smokeless tobacco: Never Used Substance and Sexual Activity  Alcohol use: No  
  Frequency: Never Comment: quit in   Drug use: No  
 Sexual activity: Yes Comment: menopausal  
Social History Narrative Works at [employer].  and lives with spouse. Family History Problem Relation Age of Onset  Hypertension Mother  Cancer Father   
     lung  Heart Disease Father  Hypertension Father  Parkinson's Disease Father  Dementia Father  Bipolar Disorder Other   
     daughter No Known Allergies MEDICATIONS Current Outpatient Medications Medication Sig  
 SITagliptin (JANUVIA) 100 mg tablet Take 1 Tab by mouth daily.  multivitamin (ONE A DAY) tablet Take 1 Tab by mouth daily.  psyllium (METAMUCIL) powd Take  by mouth.  lisinopril (PRINIVIL, ZESTRIL) 10 mg tablet Take 1 Tab by mouth daily.  metFORMIN (GLUCOPHAGE) 1,000 mg tablet Take 1 Tab by mouth two (2) times daily (with meals).  carvedilol (COREG) 3.125 mg tablet Take 1 Tab by mouth two (2) times daily (with meals).  rosuvastatin (CRESTOR) 10 mg tablet Take 1 Tab by mouth nightly.  glipiZIDE (GLUCOTROL) 5 mg tablet Take 1 Tab by mouth two (2) times a day. No current facility-administered medications for this visit. REVIEW OF SYSTEMS Per HPI Visit Vitals /82 (BP 1 Location: Left arm, BP Patient Position: Sitting) Pulse 93 Temp 98 °F (36.7 °C) (Oral) Resp 18 Ht 5' 8\" (1.727 m) Wt 322 lb 9.6 oz (146.3 kg) SpO2 96% BMI 49.05 kg/m² General: Well-developed, well-nourished. In no distress. A&O x 3. Head: Normocephalic, atraumatic. Eyes: Conjunctiva clear. Pupils equal, round, reactive to light. Extraocular movements intact. Ears/Nose: TM's and ear canals normal bilaterally. Nares normal. Septum midline. Normal nasal mucosa. No drainage or sinus tenderness. Mouth/Throat: Lips, mucosa, and tongue normal. Oropharynx benign. Neck: Supple, symmetrical, trachea midline, no lymphadenopathy, no carotid bruits, no JVD, thyroid: not enlarged, symmetric, no tenderness/mass/nodules. Lungs: Clear to auscultation bilaterally. No crackles or wheezes. No use of accessory muscles. Speaks in full sentences without SOB. Chest Wall: No tenderness or deformity. Heart: RRR, normal S1 and S2, no murmur, click, rub, or gallop. Skin: No rashes or lesions. Neurovasc: No edema appreciated. Dorsalis pedis pulses are 2+ on the right side, and 2+ on the left side. Posterior tibial pulses are 2+ on the right side, and 2+ on the left side. Musculoskeletal: Gait normal.  
Psychiatric: Normal mood and affect. Behavior is normal.  
Diabetic foot exam:  
 
Left Foot: 
 Visual Exam: normal  
 Pulse DP: 2+ (normal) Filament test: normal sensation Vibratory sensation: Vibratory sensation: normal  
   
Right Foot: 
 Visual Exam: normal  
 Pulse DP: 2+ (normal) Filament test: normal sensation Vibratory sensation: Vibratory sensation: normal 
 
 
 
 
Results for orders placed or performed in visit on 04/25/19 AMB POC HEMOGLOBIN A1C Result Value Ref Range Hemoglobin A1c (POC) 8.9 % Lab Results Component Value Date/Time  Sodium 138 01/25/2019 11:51 AM  
 Potassium 4.4 01/25/2019 11:51 AM  
 Chloride 98 01/25/2019 11:51 AM  
 CO2 24 01/25/2019 11:51 AM  
 Glucose 210 (H) 01/25/2019 11:51 AM  
 BUN 12 01/25/2019 11:51 AM  
 Creatinine 0.73 01/25/2019 11:51 AM  
 BUN/Creatinine ratio 16 01/25/2019 11:51 AM  
 GFR est  01/25/2019 11:51 AM  
 GFR est non-AA 92 01/25/2019 11:51 AM  
 Calcium 9.8 01/25/2019 11:51 AM  
 Bilirubin, total 0.4 01/25/2019 11:51 AM  
 AST (SGOT) 19 01/25/2019 11:51 AM  
 Alk. phosphatase 80 01/25/2019 11:51 AM  
 Protein, total 6.9 01/25/2019 11:51 AM  
 Albumin 4.2 01/25/2019 11:51 AM  
 A-G Ratio 1.6 01/25/2019 11:51 AM  
 ALT (SGPT) 14 01/25/2019 11:51 AM  
 
 
ASSESSMENT and PLAN Diagnoses and all orders for this visit: 
 
1. Type 2 diabetes mellitus with complication, without long-term current use of insulin ([address] -     AMB POC HEMOGLOBIN A1C 
-      DIABETES FOOT EXAM 
-     MICROALBUMIN, UR, RAND W/ MICROALB/CREAT RATIO 
-     empagliflozin (JARDIANCE) 10 mg tablet; Take 1 Tab by mouth daily. A1c improved from 9.5 to 8.9, still not at goal however pt not able to add Januvia Attempted to access approved [facility] DM medications without success Will try Jardiance due to added benefit of weight loss, pt has normal kidney function Advised pt to call if copay too high, provided coupon site on [address] [place] [address] website Next step is have pt call her insurance to investigate coverage Discussed importance of diet and exercise in mgmt and prevention of worsening diabetes 2. Obesity, morbid ([address] Pt declines referral to dietician, states she has been in the past and knows how to eat. Emphasized importance of healthy diet choices and exercise to avoid health complications from obesity and improve overall health. 3. Essential hypertension 
-     lisinopril (PRINIVIL, ZESTRIL) 10 mg tablet; Take 1 Tab by mouth daily. Not quite at goal though element of white coat HTN. Encouraged pt to check home BP 
 
4. Hypercholesteremia On statin, repeat lipids at next visit 5. Screening for cervical cancer -     REFERRAL TO OBSTETRICS AND GYNECOLOGY Patient Instructions Please let our office know if your new Diabetes medication is not affordable or if you experience any side effects. You may qualify for savings here: 
[email or web address] 
 
  
Learning About Diabetes Food Guidelines Your Care Instructions Meal planning is important to manage diabetes. It helps keep your blood sugar at a target level (which you set with your doctor). You don't have to eat special foods. You can eat what your family eats, including sweets once in a while. But you do have to pay attention to how often you eat and how much you eat of certain foods. You may want to work with a dietitian or a certified diabetes educator (CDE) to help you plan meals and snacks. A dietitian or CDE can also help you lose weight if that is one of your goals. What should you know about eating carbs? Managing the amount of carbohydrate (carbs) you eat is an important part of healthy meals when you have diabetes. Carbohydrate is found in many foods. · Learn which foods have carbs. And learn the amounts of carbs in different foods. ? Bread, cereal, pasta, and rice have about 15 grams of carbs in a serving. A serving is 1 slice of bread (1 ounce), ½ cup of cooked cereal, or 1/3 cup of cooked pasta or rice. ? Fruits have 15 grams of carbs in a serving. A serving is 1 small fresh fruit, such as an apple or orange; ½ of a banana; ½ cup of cooked or canned fruit; ½ cup of fruit juice; 1 cup of melon or raspberries; or 2 tablespoons of dried fruit. ? Milk and no-sugar-added yogurt have 15 grams of carbs in a serving. A serving is 1 cup of milk or 2/3 cup of no-sugar-added yogurt. ? Starchy vegetables have 15 grams of carbs in a serving. A serving is ½ cup of mashed potatoes or sweet potato; 1 cup winter squash; ½ of a small baked potato; ½ cup of cooked beans; or ½ cup cooked corn or green peas. · Learn how much carbs to eat each day and at each meal. A dietitian or CDE can teach you how to keep track of the amount of carbs you eat. This is called carbohydrate counting. · If you are not sure how to count carbohydrate grams, use the Plate Method to plan meals. It is a good, quick way to make sure that you have a balanced meal. It also helps you spread carbs throughout the day. ? Divide your plate by types of foods. Put non-starchy vegetables on half the plate, meat or other protein food on one-quarter of the plate, and a grain or starchy vegetable in the final quarter of the plate. To this you can add a small piece of fruit and 1 cup of milk or yogurt, depending on how many carbs you are supposed to eat at a meal. 
· Try to eat about the same amount of carbs at each meal. Do not \"save up\" your daily allowance of carbs to eat at one meal. 
· Proteins have very little or no carbs per serving. Examples of proteins are beef, chicken, turkey, fish, eggs, tofu, cheese, cottage cheese, and peanut butter. A serving size of meat is 3 ounces, which is about the size of a deck of cards. Examples of meat substitute serving sizes (equal to 1 ounce of meat) are 1/4 cup of cottage cheese, 1 egg, 1 tablespoon of peanut butter, and ½ cup of tofu. How can you eat out and still eat healthy? · Learn to estimate the serving sizes of foods that have carbohydrate. If you measure food at home, it will be easier to estimate the amount in a serving of restaurant food. · If the meal you order has too much carbohydrate (such as potatoes, corn, or baked beans), ask to have a low-carbohydrate food instead. Ask for a salad or green vegetables. · If you use insulin, check your blood sugar before and after eating out to help you plan how much to eat in the future. · If you eat more carbohydrate at a meal than you had planned, take a walk or do other exercise. This will help lower your blood sugar. What else should you know? · Limit saturated fat, such as the fat from meat and dairy products. This is a healthy choice because people who have diabetes are at higher risk of heart disease. So choose lean cuts of meat and nonfat or low-fat dairy products. Use olive or canola oil instead of butter or shortening when cooking. · Don't skip meals. Your blood sugar may drop too low if you skip meals and take insulin or certain medicines for diabetes. · Check with your doctor before you drink alcohol. Alcohol can cause your blood sugar to drop too low. Alcohol can also cause a bad reaction if you take certain diabetes medicines. Follow-up care is a key part of your treatment and safety. Be sure to make and go to all appointments, and call your doctor if you are having problems. It's also a good idea to know your test results and keep a list of the medicines you take. Where can you learn more? Go to http://niles-zach.info/. Enter N572 in the search box to learn more about \"Learning About Diabetes Food Guidelines. \" Current as of: July 25, 2018 Content Version: 11.9 © 6195-1635 Healthwise, Incorporated. Care instructions adapted under license by [employer] (which disclaims liability or warranty for this information). If you have questions about a medical condition or this instruction, always ask your healthcare professional. [address] any warranty or liability for your use of this information. Learning About Meal Planning for Diabetes Why plan your meals? Meal planning can be a key part of managing diabetes. Planning meals and snacks with the right balance of carbohydrate, protein, and fat can help you keep your blood sugar at the target level you set with your doctor. You don't have to eat special foods. You can eat what your family eats, including sweets once in a while. But you do have to pay attention to how often you eat and how much you eat of certain foods. You may want to work with a dietitian or a certified diabetes educator. He or she can give you tips and meal ideas and can answer your questions about meal planning. This health professional can also help you reach a healthy weight if that is one of your goals. What plan is right for you? Your dietitian or diabetes educator may suggest that you start with the plate format or carbohydrate counting. The plate format The plate format is a simple way to help you manage how you eat. You plan meals by learning how much space each food should take on a plate. Using the plate format helps you spread carbohydrate throughout the day. It can make it easier to keep your blood sugar level within your target range. It also helps you see if you're eating healthy portion sizes. To use the plate format, you put non-starchy vegetables on half your plate. Add meat or meat substitutes on one-quarter of the plate. Put a grain or starchy vegetable (such as brown rice or a potato) on the final quarter of the plate. You can add a small piece of fruit and some low-fat or fat-free milk or yogurt, depending on your carbohydrate goal for each meal. 
Here are some tips for using the plate format: · Make sure that you are not using an oversized plate. A 9-inch plate is best. Many restaurants use larger plates. · Get used to using the plate format at home. Then you can use it when you eat out. · Write down your questions about using the plate format. Talk to your doctor, a dietitian, or a diabetes educator about your concerns. Carbohydrate counting With carbohydrate counting, you plan meals based on the amount of carbohydrate in each food. Carbohydrate raises blood sugar higher and more quickly than any other nutrient. It is found in desserts, breads and cereals, and fruit. It's also found in starchy vegetables such as potatoes and corn, grains such as rice and pasta, and milk and yogurt.  Spreading carbohydrate throughout the day helps keep your blood sugar levels within your target range. Your daily amount depends on several things, including your weight, how active you are, which diabetes medicines you take, and what your goals are for your blood sugar levels. A registered dietitian or diabetes educator can help you plan how much carbohydrate to include in each meal and snack. A guideline for your daily amount of carbohydrate is: · 45 to 60 grams at each meal. That's about the same as 3 to 4 carbohydrate servings. · 15 to 20 grams at each snack. That's about the same as 1 carbohydrate serving. The Nutrition Facts label on packaged foods tells you how much carbohydrate is in a serving of the food. First, look at the serving size on the food label. Is that the amount you eat in a serving? All of the nutrition information on a food label is based on that serving size. So if you eat more or less than that, you'll need to adjust the other numbers. Total carbohydrate is the next thing you need to look for on the label. If you count carbohydrate servings, one serving of carbohydrate is 15 grams. For foods that don't come with labels, such as fresh fruits and vegetables, you'll need a guide that lists carbohydrate in these foods. Ask your doctor, dietitian, or diabetes educator about books or other nutrition guides you can use. If you take insulin, you need to know how many grams of carbohydrate are in a meal. This lets you know how much rapid-acting insulin to take before you eat. If you use an insulin pump, you get a constant rate of insulin during the day. So the pump must be programmed at meals to give you extra insulin to cover the rise in blood sugar after meals. When you know how much carbohydrate you will eat, you can take the right amount of insulin. Or, if you always use the same amount of insulin, you need to make sure that you eat the same amount of carbohydrate at meals. If you need more help to understand carbohydrate counting and food labels, ask your doctor, dietitian, or diabetes educator. How do you get started with meal planning? Here are some tips to get started: 
· Plan your meals a week at a time. Don't forget to include snacks too. · Use cookbooks or online recipes to plan several main meals. Plan some quick meals for busy nights. You also can double some recipes that freeze well. Then you can save half for other busy nights when you don't have time to cook. · Make sure you have the ingredients you need for your recipes. If you're running low on basic items, put these items on your shopping list too. · List foods that you use to make breakfasts, lunches, and snacks. List plenty of fruits and vegetables. · Post this list on the refrigerator. Add to it as you think of more things you need. · Take the list to the store to do your weekly shopping. Follow-up care is a key part of your treatment and safety. Be sure to make and go to all appointments, and call your doctor if you are having problems. It's also a good idea to know your test results and keep a list of the medicines you take. Where can you learn more? Go to http://niles-zach.info/. Luci Tiwari in the search box to learn more about \"Learning About Meal Planning for Diabetes. \" Current as of: July 25, 2018 Content Version: 11.9 © 3526-3318 [employer]. Care instructions adapted under license by [employer] (which disclaims liability or warranty for this information). If you have questions about a medical condition or this instruction, always ask your healthcare professional. [place] [address] any warranty or liability for your use of this information. Learning About Low-Carbohydrate Diets for Weight Loss What is a low-carbohydrate diet? Low-carb diets avoid foods that are high in carbohydrate.  These high-carb foods include pasta, bread, rice, cereal, fruits, and starchy vegetables. Instead, these diets usually have you eat foods that are high in fat and protein. Many people lose weight quickly on a low-carb diet. But the early weight loss is water. People on this diet often gain the weight back after they start eating carbs again. Not all diet plans are safe or work well. A lot of the evidence shows that low-carb diets aren't healthy. That's because these diets often don't include healthy foods like fruits and vegetables. Losing weight safely means balancing protein, fat, and carbs with every meal and snack. And low-carb diets don't always provide the vitamins, minerals, and fiber you need. If you have a serious medical condition, talk to your doctor before you try any diet. These conditions include kidney disease, heart disease, type 2 diabetes, high cholesterol, and high blood pressure. If you are pregnant, it may not be safe for your baby if you are on a low-carb diet. How can you lose weight safely? You might have heard that a diet plan helped another person lose weight. But that doesn't mean that it will work for you. It is very hard to stay on a diet that includes lots of big changes in your eating habits. If you want to get to a healthy weight and stay there, making healthy lifestyle changes will often work better than dieting. These steps can help. · Make a plan for change. Work with your doctor to create a plan that is right for you. · See a dietitian. He or she can show you how to make healthy changes in your eating habits. · Manage stress. If you have a lot of stress in your life, it can be hard to focus on making healthy changes to your daily habits. · Track your food and activity. You are likely to do better at losing weight if you keep track of what you eat and what you do. Follow-up care is a key part of your treatment and safety.  Be sure to make and go to all appointments, and call your doctor if you are having problems. It's also a good idea to know your test results and keep a list of the medicines you take. Where can you learn more? Go to http://niles-zach.info/. Enter A121 in the search box to learn more about \"Learning About Low-Carbohydrate Diets for Weight Loss. \" Current as of: March 28, 2018 Content Version: 11.9 © 7273-8279 [employer]. Care instructions adapted under license by [employer] (which disclaims liability or warranty for this information). If you have questions about a medical condition or this instruction, always ask your healthcare professional. [place] [address] any warranty or liability for your use of this information. Please keep your follow-up appointment with Jenny Womack NP. Health Maintenance Due Topic Date Due  
 FOOT EXAM Q1  02/09/1971  MICROALBUMIN Q1  02/09/1971  BREAST CANCER SCRN MAMMOGRAM  02/09/2011 Sees Dr. Kimi Wright for eye exam. 
Follows with Vasile ARELLANO for colonoscopy. Declines TDAP and pneum vac and shingrix. Mammogram ordered last visit January 2019. Pt given number again. I have discussed the diagnosis with the patient and the intended plan as seen in the above orders. Patient is in agreement. The patient has received an after-visit summary and questions were answered concerning future plans. I have discussed medication side effects and warnings with the patient as well. Warning signs for the above conditions were discussed including when to call our office or go to the emergency room. The nurse provided the patient and/or family with advanced directive information if needed and encouraged the patient to provide a copy to the office when available.

## 2019-04-25 ENCOUNTER — OFFICE VISIT (OUTPATIENT)
Dept: INTERNAL MEDICINE CLINIC | Facility: CLINIC | Age: 59
End: 2019-04-25

## 2019-04-25 VITALS
HEIGHT: 68 IN | DIASTOLIC BLOOD PRESSURE: 80 MMHG | TEMPERATURE: 98 F | WEIGHT: 293 LBS | BODY MASS INDEX: 44.41 KG/M2 | RESPIRATION RATE: 18 BRPM | SYSTOLIC BLOOD PRESSURE: 130 MMHG | OXYGEN SATURATION: 96 % | HEART RATE: 93 BPM

## 2019-04-25 DIAGNOSIS — E11.8 TYPE 2 DIABETES MELLITUS WITH COMPLICATION, WITHOUT LONG-TERM CURRENT USE OF INSULIN (HCC): Primary | ICD-10-CM

## 2019-04-25 DIAGNOSIS — Z12.4 SCREENING FOR CERVICAL CANCER: ICD-10-CM

## 2019-04-25 DIAGNOSIS — I10 ESSENTIAL HYPERTENSION: ICD-10-CM

## 2019-04-25 DIAGNOSIS — E66.01 OBESITY, MORBID (HCC): ICD-10-CM

## 2019-04-25 DIAGNOSIS — E78.00 HYPERCHOLESTEREMIA: ICD-10-CM

## 2019-04-25 LAB — HBA1C MFR BLD HPLC: 8.9 %

## 2019-04-25 RX ORDER — LISINOPRIL 10 MG/1
10 TABLET ORAL DAILY
Qty: 90 TAB | Refills: 4 | Status: SHIPPED | OUTPATIENT
Start: 2019-04-25 | End: 2020-07-21

## 2019-04-25 NOTE — LETTER
NOTIFICATION RETURN TO WORK / SCHOOL 
 
4/25/2019 2:41 PM 
 
Ms. Laurine Alpers [address] [id number] To Whom It May Concern: 
 
Laurine Alpers is currently under the care of [address] [place] [address]. Patient needs to sleep in a cool air-conditioned environment. If there are questions or concerns please have the patient contact our office. Sincerely, Cristal Crockett NP

## 2019-04-25 NOTE — PATIENT INSTRUCTIONS
Please let our office know if your new Diabetes medication is not affordable or if you experience any side effects. You may qualify for savings here: 
[email or web address] 
 
  
Learning About Diabetes Food Guidelines Your Care Instructions Meal planning is important to manage diabetes. It helps keep your blood sugar at a target level (which you set with your doctor). You don't have to eat special foods. You can eat what your family eats, including sweets once in a while. But you do have to pay attention to how often you eat and how much you eat of certain foods. You may want to work with a dietitian or a certified diabetes educator (CDE) to help you plan meals and snacks. A dietitian or CDE can also help you lose weight if that is one of your goals. What should you know about eating carbs? Managing the amount of carbohydrate (carbs) you eat is an important part of healthy meals when you have diabetes. Carbohydrate is found in many foods. · Learn which foods have carbs. And learn the amounts of carbs in different foods. ? Bread, cereal, pasta, and rice have about 15 grams of carbs in a serving. A serving is 1 slice of bread (1 ounce), ½ cup of cooked cereal, or 1/3 cup of cooked pasta or rice. ? Fruits have 15 grams of carbs in a serving. A serving is 1 small fresh fruit, such as an apple or orange; ½ of a banana; ½ cup of cooked or canned fruit; ½ cup of fruit juice; 1 cup of melon or raspberries; or 2 tablespoons of dried fruit. ? Milk and no-sugar-added yogurt have 15 grams of carbs in a serving. A serving is 1 cup of milk or 2/3 cup of no-sugar-added yogurt. ? Starchy vegetables have 15 grams of carbs in a serving. A serving is ½ cup of mashed potatoes or sweet potato; 1 cup winter squash; ½ of a small baked potato; ½ cup of cooked beans; or ½ cup cooked corn or green peas.  
· Learn how much carbs to eat each day and at each meal. A dietitian or CDE can teach you how to keep track of the amount of carbs you eat. This is called carbohydrate counting. · If you are not sure how to count carbohydrate grams, use the Plate Method to plan meals. It is a good, quick way to make sure that you have a balanced meal. It also helps you spread carbs throughout the day. ? Divide your plate by types of foods. Put non-starchy vegetables on half the plate, meat or other protein food on one-quarter of the plate, and a grain or starchy vegetable in the final quarter of the plate. To this you can add a small piece of fruit and 1 cup of milk or yogurt, depending on how many carbs you are supposed to eat at a meal. 
· Try to eat about the same amount of carbs at each meal. Do not \"save up\" your daily allowance of carbs to eat at one meal. 
· Proteins have very little or no carbs per serving. Examples of proteins are beef, chicken, turkey, fish, eggs, tofu, cheese, cottage cheese, and peanut butter. A serving size of meat is 3 ounces, which is about the size of a deck of cards. Examples of meat substitute serving sizes (equal to 1 ounce of meat) are 1/4 cup of cottage cheese, 1 egg, 1 tablespoon of peanut butter, and ½ cup of tofu. How can you eat out and still eat healthy? · Learn to estimate the serving sizes of foods that have carbohydrate. If you measure food at home, it will be easier to estimate the amount in a serving of restaurant food. · If the meal you order has too much carbohydrate (such as potatoes, corn, or baked beans), ask to have a low-carbohydrate food instead. Ask for a salad or green vegetables. · If you use insulin, check your blood sugar before and after eating out to help you plan how much to eat in the future. · If you eat more carbohydrate at a meal than you had planned, take a walk or do other exercise. This will help lower your blood sugar. What else should you know? · Limit saturated fat, such as the fat from meat and dairy products.  This is a healthy choice because people who have diabetes are at higher risk of heart disease. So choose lean cuts of meat and nonfat or low-fat dairy products. Use olive or canola oil instead of butter or shortening when cooking. · Don't skip meals. Your blood sugar may drop too low if you skip meals and take insulin or certain medicines for diabetes. · Check with your doctor before you drink alcohol. Alcohol can cause your blood sugar to drop too low. Alcohol can also cause a bad reaction if you take certain diabetes medicines. Follow-up care is a key part of your treatment and safety. Be sure to make and go to all appointments, and call your doctor if you are having problems. It's also a good idea to know your test results and keep a list of the medicines you take. Where can you learn more? Go to http://niles-zach.info/. Enter X448 in the search box to learn more about \"Learning About Diabetes Food Guidelines. \" Current as of: July 25, 2018 Content Version: 11.9 © 6027-7916 [employer]. Care instructions adapted under license by [employer] (which disclaims liability or warranty for this information). If you have questions about a medical condition or this instruction, always ask your healthcare professional. [address] any warranty or liability for your use of this information. Learning About Meal Planning for Diabetes Why plan your meals? Meal planning can be a key part of managing diabetes. Planning meals and snacks with the right balance of carbohydrate, protein, and fat can help you keep your blood sugar at the target level you set with your doctor. You don't have to eat special foods. You can eat what your family eats, including sweets once in a while. But you do have to pay attention to how often you eat and how much you eat of certain foods. You may want to work with a dietitian or a certified diabetes educator.  He or she can give you tips and meal ideas and can answer your questions about meal planning. This health professional can also help you reach a healthy weight if that is one of your goals. What plan is right for you? Your dietitian or diabetes educator may suggest that you start with the plate format or carbohydrate counting. The plate format The plate format is a simple way to help you manage how you eat. You plan meals by learning how much space each food should take on a plate. Using the plate format helps you spread carbohydrate throughout the day. It can make it easier to keep your blood sugar level within your target range. It also helps you see if you're eating healthy portion sizes. To use the plate format, you put non-starchy vegetables on half your plate. Add meat or meat substitutes on one-quarter of the plate. Put a grain or starchy vegetable (such as brown rice or a potato) on the final quarter of the plate. You can add a small piece of fruit and some low-fat or fat-free milk or yogurt, depending on your carbohydrate goal for each meal. 
Here are some tips for using the plate format: · Make sure that you are not using an oversized plate. A 9-inch plate is best. Many restaurants use larger plates. · Get used to using the plate format at home. Then you can use it when you eat out. · Write down your questions about using the plate format. Talk to your doctor, a dietitian, or a diabetes educator about your concerns. Carbohydrate counting With carbohydrate counting, you plan meals based on the amount of carbohydrate in each food. Carbohydrate raises blood sugar higher and more quickly than any other nutrient. It is found in desserts, breads and cereals, and fruit. It's also found in starchy vegetables such as potatoes and corn, grains such as rice and pasta, and milk and yogurt. Spreading carbohydrate throughout the day helps keep your blood sugar levels within your target range. Your daily amount depends on several things, including your weight, how active you are, which diabetes medicines you take, and what your goals are for your blood sugar levels. A registered dietitian or diabetes educator can help you plan how much carbohydrate to include in each meal and snack. A guideline for your daily amount of carbohydrate is: · 45 to 60 grams at each meal. That's about the same as 3 to 4 carbohydrate servings. · 15 to 20 grams at each snack. That's about the same as 1 carbohydrate serving. The Nutrition Facts label on packaged foods tells you how much carbohydrate is in a serving of the food. First, look at the serving size on the food label. Is that the amount you eat in a serving? All of the nutrition information on a food label is based on that serving size. So if you eat more or less than that, you'll need to adjust the other numbers. Total carbohydrate is the next thing you need to look for on the label. If you count carbohydrate servings, one serving of carbohydrate is 15 grams. For foods that don't come with labels, such as fresh fruits and vegetables, you'll need a guide that lists carbohydrate in these foods. Ask your doctor, dietitian, or diabetes educator about books or other nutrition guides you can use. If you take insulin, you need to know how many grams of carbohydrate are in a meal. This lets you know how much rapid-acting insulin to take before you eat. If you use an insulin pump, you get a constant rate of insulin during the day. So the pump must be programmed at meals to give you extra insulin to cover the rise in blood sugar after meals. When you know how much carbohydrate you will eat, you can take the right amount of insulin. Or, if you always use the same amount of insulin, you need to make sure that you eat the same amount of carbohydrate at meals.  
If you need more help to understand carbohydrate counting and food labels, ask your doctor, dietitian, or diabetes educator. How do you get started with meal planning? Here are some tips to get started: 
· Plan your meals a week at a time. Don't forget to include snacks too. · Use cookbooks or online recipes to plan several main meals. Plan some quick meals for busy nights. You also can double some recipes that freeze well. Then you can save half for other busy nights when you don't have time to cook. · Make sure you have the ingredients you need for your recipes. If you're running low on basic items, put these items on your shopping list too. · List foods that you use to make breakfasts, lunches, and snacks. List plenty of fruits and vegetables. · Post this list on the refrigerator. Add to it as you think of more things you need. · Take the list to the store to do your weekly shopping. Follow-up care is a key part of your treatment and safety. Be sure to make and go to all appointments, and call your doctor if you are having problems. It's also a good idea to know your test results and keep a list of the medicines you take. Where can you learn more? Go to http://nilesConjure.info/. Eliza Anaya in the search box to learn more about \"Learning About Meal Planning for Diabetes. \" Current as of: July 25, 2018 Content Version: 11.9 © 8613-9787 [employer], [employer]. Care instructions adapted under license by [employer] (which disclaims liability or warranty for this information). If you have questions about a medical condition or this instruction, always ask your healthcare professional. [address] any warranty or liability for your use of this information. Learning About Low-Carbohydrate Diets for Weight Loss What is a low-carbohydrate diet? Low-carb diets avoid foods that are high in carbohydrate. These high-carb foods include pasta, bread, rice, cereal, fruits, and starchy vegetables. Instead, these diets usually have you eat foods that are high in fat and protein. Many people lose weight quickly on a low-carb diet. But the early weight loss is water. People on this diet often gain the weight back after they start eating carbs again. Not all diet plans are safe or work well. A lot of the evidence shows that low-carb diets aren't healthy. That's because these diets often don't include healthy foods like fruits and vegetables. Losing weight safely means balancing protein, fat, and carbs with every meal and snack. And low-carb diets don't always provide the vitamins, minerals, and fiber you need. If you have a serious medical condition, talk to your doctor before you try any diet. These conditions include kidney disease, heart disease, type 2 diabetes, high cholesterol, and high blood pressure. If you are pregnant, it may not be safe for your baby if you are on a low-carb diet. How can you lose weight safely? You might have heard that a diet plan helped another person lose weight. But that doesn't mean that it will work for you. It is very hard to stay on a diet that includes lots of big changes in your eating habits. If you want to get to a healthy weight and stay there, making healthy lifestyle changes will often work better than dieting. These steps can help. · Make a plan for change. Work with your doctor to create a plan that is right for you. · See a dietitian. He or she can show you how to make healthy changes in your eating habits. · Manage stress. If you have a lot of stress in your life, it can be hard to focus on making healthy changes to your daily habits. · Track your food and activity. You are likely to do better at losing weight if you keep track of what you eat and what you do. Follow-up care is a key part of your treatment and safety.  Be sure to make and go to all appointments, and call your doctor if you are having problems. It's also a good idea to know your test results and keep a list of the medicines you take. Where can you learn more? Go to http://niles-zach.info/. Enter A121 in the search box to learn more about \"Learning About Low-Carbohydrate Diets for Weight Loss. \" Current as of: March 28, 2018 Content Version: 11.9 © 4873-6430 [employer], [employer]. Care instructions adapted under license by [employer] (which disclaims liability or warranty for this information). If you have questions about a medical condition or this instruction, always ask your healthcare professional. [place] [address] any warranty or liability for your use of this information.

## 2019-04-25 NOTE — PROGRESS NOTES
Kallie Unger  Identified pt with two pt identifiers(name and ). Chief Complaint Patient presents with  Follow-up  Hypertension  Diabetes  Ear Pain  
  left Reviewed record In preparation for visit and have obtained necessary documentation. Has info on advanced directive but has not filled them out. 1. Have you been to the ER, urgent care clinic or hospitalized since your last visit? No  
 
2. Have you seen or consulted any other health care providers outside of the [address] [place] [address] since your last visit? Include any pap smears or colon screening. No 
 
Vitals reviewed with provider. Health Maintenance reviewed:  
 
Health Maintenance Due Topic  FOOT EXAM Q1   
 MICROALBUMIN Q1   
 BREAST CANCER SCRN MAMMOGRAM   
d. Wt Readings from Last 3 Encounters:  
19 322 lb 9.6 oz (146.3 kg) 19 321 lb 3.2 oz (145.7 kg)  
t 3 Temp Readings from Last 3 Encounters:  
19 98 °F (36.7 °C) (Oral) 19 98.1 °F (36.7 °C) (Oral) 150/80

## 2019-04-27 LAB
ALBUMIN/CREAT UR: 111.3 MG/G CREAT (ref 0–30)
CREAT UR-MCNC: 32.6 MG/DL
MICROALBUMIN UR-MCNC: 36.3 UG/ML

## 2019-04-28 NOTE — PROGRESS NOTES
Pls advise pt her diabetes urine test shows her diabetes is affecting her kidneys. The way we treat this is by continuing her current DM and HTN medications, and improving her diabetes though diet, weight loss and medication.

## 2019-09-18 NOTE — PROGRESS NOTES
HPI  Rizwan Chau is a 63y.o. year old female patient of Mike Doan NP who presents with c/o 3 mos f/u for DM, HTN, Glenny Garcia. Pt has history of has Type 2 diabetes mellitus with microalbuminuria, without long-term current use of insulin ([address] HTN (hypertension), Hypercholesteremia, and Obesity, morbid ([address] on their problem list..    Pt here for well exam.    HTN  Home BP: doesn't check at home. Medication regimen/ADR/missed doses: carvdeilol 3.125 BID, Lisinopril 10mg   Denies chest pain, chest pressure, or palpitations. Denies SOB, orthopnea, or PND. Denies HA, dizziness, blurred vision, or swelling.         Diabetic ROS   medication compliance/ADR: metformin 1000 BID, glipizide 5mg BID, Jardiance added at last visit- she was able to get it for $15 from [employer] website for 1 year, denies any genital or urinary side effects, tolerating medication well   home glucose monitoring: BS range 150s in Am, evening 160s (2hrs after lunch), no numbers in 200s, none below 100  diabetic ROS: Denies lightheadedness, feeling jittery or irritable. Denies numbness/tingling in extremities. Diet: Breakfast is plain cheerios, snack is grapes, lunch is tuna, cottage cheese, pineapple, dinner is home cooked. No sugary drinks. Exercise: Exercising 3 times a week- walking. Weight: Lost 17lbs since June. Colonoscopy 4 years ago, told normal, f/u in 10 years. Unsure of name, had in [address] [place] [address], maybe Dr. Ankur Centeno? States former PCP Dr. Marion Abraham PCP [facility] should have records. Last PAP long time ago. Will get flu shot today.     Patient Active Problem List   Diagnosis Code    Type 2 diabetes mellitus with microalbuminuria, without long-term current use of insulin (HCC) E11.29, R80.9    HTN (hypertension) I10    Hypercholesteremia E78.00    Obesity, morbid ([address] E66.01     Past Medical History:   Diagnosis Date    Diabetes ([address]     HTN (hypertension)     Hypercholesteremia      Past Surgical History:   Procedure Laterality Date    HX  SECTION       Social History     Socioeconomic History    Marital status:      Spouse name: Not on file    Number of children: Not on file    Years of education: Not on file    Highest education level: Not on file   Tobacco Use    Smoking status: Former Smoker     Packs/day: 1.00     Types: Cigarettes     Last attempt to quit:      Years since quittin.7    Smokeless tobacco: Never Used   Substance and Sexual Activity    Alcohol use: No     Frequency: Never     Comment: quit in [place] [address] Drug use: No    Sexual activity: Yes     Comment: menopausal   Social History Narrative    Works at [employer].  and lives with spouse. Family History   Problem Relation Age of Onset    Hypertension Mother     Cancer Father         lung    Heart Disease Father     Hypertension Father     Parkinson's Disease Father     Dementia Father     Bipolar Disorder Other         daughter     No Known Allergies    MEDICATIONS  Current Outpatient Medications   Medication Sig    lisinopril (PRINIVIL, ZESTRIL) 10 mg tablet Take 1 Tab by mouth daily.  empagliflozin (JARDIANCE) 10 mg tablet Take 1 Tab by mouth daily.  multivitamin (ONE A DAY) tablet Take 1 Tab by mouth daily.  psyllium (METAMUCIL) powd Take  by mouth.  metFORMIN (GLUCOPHAGE) 1,000 mg tablet Take 1 Tab by mouth two (2) times daily (with meals).  carvedilol (COREG) 3.125 mg tablet Take 1 Tab by mouth two (2) times daily (with meals).  rosuvastatin (CRESTOR) 10 mg tablet Take 1 Tab by mouth nightly.  glipiZIDE (GLUCOTROL) 5 mg tablet Take 1 Tab by mouth two (2) times a day.  fluticasone propionate (FLONASE ALLERGY RELIEF) 50 mcg/actuation nasal spray Flonase Allergy Relief 50 mcg/actuation nasal spray,suspension   Spray 1 spray every day by intranasal route. No current facility-administered medications for this visit.         REVIEW OF SYSTEMS  Per HPI        Visit Vitals  /81 (BP 1 Location: Left arm, BP Patient Position: Sitting)   Pulse 98   Temp 98.2 °F (36.8 °C) (Oral)   Resp 18   Ht 5' 8\" (1.727 m)   Wt 305 lb (138.3 kg)   SpO2 96%   BMI 46.38 kg/m²     Change in Patient Weight by Encounter (365 Day Lookback)    (09/19/2019) Office Visit  Last Recorded Weight: 138.3 kg (305 lb)             Percent Change: -5.47%   [04/25/2019 to 09/19/2019 (147 Days)]    (04/25/2019) Office Visit  Last Recorded Weight: 146.3 kg (322 lb 9.6 oz)             Percent Change: +0.41%   [01/25/2019 to 04/25/2019 (90 Days)]    (01/25/2019) Office Visit  Last Recorded Weight: 145.7 kg (321 lb 3.2 oz)             General: Well-developed, well-nourished. In no distress. A&O x 3. Head: Normocephalic, atraumatic. Eyes: Conjunctiva clear. Pupils equal, round, reactive to light. Extraocular movements intact. Ears/Nose: Nares normal. Septum midline. Normal nasal mucosa. No drainage or sinus tenderness. Mouth/Throat: Lips, mucosa, and tongue normal. Oropharynx benign. Neck: Supple, symmetrical, trachea midline, no lymphadenopathy, no carotid bruits, no JVD, thyroid: not enlarged, symmetric, no tenderness/mass/nodules. Lungs: Clear to auscultation bilaterally. No crackles or wheezes. No use of accessory muscles. Speaks in full sentences without SOB. Chest Wall: No tenderness or deformity. Heart: RRR, normal S1 and S2, no murmur, click, rub, or gallop. Back: Symmetric. Abdomen: Soft, non-distended  Skin: No rashes or lesions. Neurovasc: No edema appreciated. Dorsalis pedis pulses are 2+ on the right side, and 2+ on the left side. Posterior tibial pulses are 2+ on the right side, and 2+ on the left side. Musculoskeletal: Gait normal.   Psychiatric: Normal mood and affect. Behavior is normal.       No results found for any visits on 09/19/19. ASSESSMENT and PLAN  Diagnoses and all orders for this visit:    1. Annual physical exam    2. Type 2 diabetes mellitus with microalbuminuria, without long-term current use of insulin (HCC)  -     METABOLIC PANEL, COMPREHENSIVE  -     HEMOGLOBIN A1C WITH EAG    3. Essential hypertension  -     METABOLIC PANEL, COMPREHENSIVE  Well controlled    4. Hypercholesteremia  -     LIPID PANEL  Tolerating statin    5. Obesity, morbid ([facility]  Congratulated pt on 17lbs weight loss  Continue healthy diet  Add back weight training, continue walking    6. Encounter for immunization  -     INFLUENZA VIRUS VAC QUAD,SPLIT,PRESV FREE SYRINGE IM  -     [place] IMMUNIZ ADMIN,1 SINGLE/COMB VAC/TOXOID    7. Screening for breast cancer  -     TERENCE MAMMO BI SCREENING INCL CAD; Future    Make f/u appt for PAP only. Patient Instructions        Learning About Diabetes Food Guidelines  Your Care Instructions    Meal planning is important to manage diabetes. It helps keep your blood sugar at a target level (which you set with your doctor). You don't have to eat special foods. You can eat what your family eats, including sweets once in a while. But you do have to pay attention to how often you eat and how much you eat of certain foods. You may want to work with a dietitian or a certified diabetes educator (CDE) to help you plan meals and snacks. A dietitian or CDE can also help you lose weight if that is one of your goals. What should you know about eating carbs? Managing the amount of carbohydrate (carbs) you eat is an important part of healthy meals when you have diabetes. Carbohydrate is found in many foods. · Learn which foods have carbs. And learn the amounts of carbs in different foods. ? Bread, cereal, pasta, and rice have about 15 grams of carbs in a serving. A serving is 1 slice of bread (1 ounce), ½ cup of cooked cereal, or 1/3 cup of cooked pasta or rice. ? Fruits have 15 grams of carbs in a serving.  A serving is 1 small fresh fruit, such as an apple or orange; ½ of a banana; ½ cup of cooked or canned fruit; ½ cup of fruit juice; 1 cup of melon or raspberries; or 2 tablespoons of dried fruit. ? Milk and no-sugar-added yogurt have 15 grams of carbs in a serving. A serving is 1 cup of milk or 2/3 cup of no-sugar-added yogurt. ? Starchy vegetables have 15 grams of carbs in a serving. A serving is ½ cup of mashed potatoes or sweet potato; 1 cup winter squash; ½ of a small baked potato; ½ cup of cooked beans; or ½ cup cooked corn or green peas. · Learn how much carbs to eat each day and at each meal. A dietitian or CDE can teach you how to keep track of the amount of carbs you eat. This is called carbohydrate counting. · If you are not sure how to count carbohydrate grams, use the Plate Method to plan meals. It is a good, quick way to make sure that you have a balanced meal. It also helps you spread carbs throughout the day. ? Divide your plate by types of foods. Put non-starchy vegetables on half the plate, meat or other protein food on one-quarter of the plate, and a grain or starchy vegetable in the final quarter of the plate. To this you can add a small piece of fruit and 1 cup of milk or yogurt, depending on how many carbs you are supposed to eat at a meal.  · Try to eat about the same amount of carbs at each meal. Do not \"save up\" your daily allowance of carbs to eat at one meal.  · Proteins have very little or no carbs per serving. Examples of proteins are beef, chicken, turkey, fish, eggs, tofu, cheese, cottage cheese, and peanut butter. A serving size of meat is 3 ounces, which is about the size of a deck of cards. Examples of meat substitute serving sizes (equal to 1 ounce of meat) are 1/4 cup of cottage cheese, 1 egg, 1 tablespoon of peanut butter, and ½ cup of tofu. How can you eat out and still eat healthy? · Learn to estimate the serving sizes of foods that have carbohydrate. If you measure food at home, it will be easier to estimate the amount in a serving of restaurant food.   · If the meal you order has too much carbohydrate (such as potatoes, corn, or baked beans), ask to have a low-carbohydrate food instead. Ask for a salad or green vegetables. · If you use insulin, check your blood sugar before and after eating out to help you plan how much to eat in the future. · If you eat more carbohydrate at a meal than you had planned, take a walk or do other exercise. This will help lower your blood sugar. What else should you know? · Limit saturated fat, such as the fat from meat and dairy products. This is a healthy choice because people who have diabetes are at higher risk of heart disease. So choose lean cuts of meat and nonfat or low-fat dairy products. Use olive or canola oil instead of butter or shortening when cooking. · Don't skip meals. Your blood sugar may drop too low if you skip meals and take insulin or certain medicines for diabetes. · Check with your doctor before you drink alcohol. Alcohol can cause your blood sugar to drop too low. Alcohol can also cause a bad reaction if you take certain diabetes medicines. Follow-up care is a key part of your treatment and safety. Be sure to make and go to all appointments, and call your doctor if you are having problems. It's also a good idea to know your test results and keep a list of the medicines you take. Where can you learn more? Go to http://niles-zach.info/. Enter J196 in the search box to learn more about \"Learning About Diabetes Food Guidelines. \"  Current as of: July 25, 2018  Content Version: 12.1  © 0724-2065 [employer]. Care instructions adapted under license by [employer] (which disclaims liability or warranty for this information). If you have questions about a medical condition or this instruction, always ask your healthcare professional. [place] [address] any warranty or liability for your use of this information.         Vaccine Information Statement    Influenza (Flu) Vaccine (Inactivated or Recombinant): What You Need to Know    Many Vaccine Information Statements are available in Vietnamese and other languages. See www.immunize.org/vis  Hojas de información sobre vacunas están disponibles en español y en muchos otros idiomas. Visite www.immunize.org/vis    1. Why get vaccinated? Influenza vaccine can prevent influenza (flu). Flu is a contagious disease that spreads around the [facility] every year, usually between October and May. Anyone can get the flu, but it is more dangerous for some people. Infants and young children, people 72years of age and older, pregnant women, and people with certain health conditions or a weakened immune system are at greatest risk of flu complications. Pneumonia, bronchitis, sinus infections and ear infections are examples of flu-related complications. If you have a medical condition, such as heart disease, cancer or diabetes, flu can make it worse. Flu can cause fever and chills, sore throat, muscle aches, fatigue, cough, headache, and runny or stuffy nose. Some people may have vomiting and diarrhea, though this is more common in children than adults. Each year thousands of people in the [facility] die from flu, and many more are hospitalized. Flu vaccine prevents millions of illnesses and flu-related visits to the doctor each year. 2. Influenza vaccines     CDC recommends everyone 10months of age and older get vaccinated every flu season. Children 6 months through 6years of age may need 2 doses during a single flu season. Everyone else needs only 1 dose each flu season. It takes about 2 weeks for protection to develop after vaccination. There are many flu viruses, and they are always changing. Each year a new flu vaccine is made to protect against three or four viruses that are likely to cause disease in the upcoming flu season. Even when the vaccine doesnt exactly match these viruses, it may still provide some protection. Influenza vaccine does not cause flu. Influenza vaccine may be given at the same time as other vaccines. 3. Talk with your health care provider    Tell your vaccine provider if the person getting the vaccine:   Has had an allergic reaction after a previous dose of influenza vaccine, or has any severe, life-threatening allergies.  Has ever had Guillain-Barré Syndrome (also called GBS). In some cases, your health care provider may decide to postpone influenza vaccination to a future visit. People with minor illnesses, such as a cold, may be vaccinated. People who are moderately or severely ill should usually wait until they recover before getting influenza vaccine. Your health care provider can give you more information. 4. Risks of a reaction     Soreness, redness, and swelling where shot is given, fever, muscle aches, and headache can happen after influenza vaccine.  There may be a very small increased risk of Guillain-Barré Syndrome (GBS) after inactivated influenza vaccine (the flu shot). Stephenie Albert children who get the flu shot along with pneumococcal vaccine (PCV13), and/or DTaP vaccine at the same time might be slightly more likely to have a seizure caused by fever. Tell your health care provider if a child who is getting flu vaccine has ever had a seizure. People sometimes faint after medical procedures, including vaccination. Tell your provider if you feel dizzy or have vision changes or ringing in the ears. As with any medicine, there is a very remote chance of a vaccine causing a severe allergic reaction, other serious injury, or death. 5. What if there is a serious problem? An allergic reaction could occur after the vaccinated person leaves the clinic.  If you see signs of a severe allergic reaction (hives, swelling of the face and throat, difficulty breathing, a fast heartbeat, dizziness, or weakness), call 9-1-1 and get the person to the nearest hospital.    For other signs that concern you, call your health care provider. Adverse reactions should be reported to the Vaccine Adverse Event Reporting System (VAERS). Your health care provider will usually file this report, or you can do it yourself. Visit the VAERS website at www.vaers. hhs.gov or call 0-216.994.3965. VAERS is only for reporting reactions, and VAERS staff do not give medical advice. 6. The National Vaccine Injury Compensation Program    The [facility] Vaccine Injury Compensation Program (VICP) is a federal program that was created to compensate people who may have been injured by certain vaccines. Visit the VICP website at www.[facility].gov/vaccinecompensation or call 5-364.614.3573 to learn about the program and about filing a claim. There is a time limit to file a claim for compensation. 7. How can I learn more?  Ask your health care provider.  Call your local or state health department.  Contact the Centers for Disease Control and Prevention (CDC):  - Call 8-114.920.8910 (0-808-GXH-INFO) or  - Visit CDCs influenza website at www.cdc.gov/flu    Vaccine Information Statement (Interim)  Inactivated Influenza Vaccine   8/15/2019  42 MAXI Kumar [id number]   Department of Health and Human Services  Centers for Disease Control and Prevention    Office Use Only         Learning About Diabetes Food Guidelines  Your Care Instructions    Meal planning is important to manage diabetes. It helps keep your blood sugar at a target level (which you set with your doctor). You don't have to eat special foods. You can eat what your family eats, including sweets once in a while. But you do have to pay attention to how often you eat and how much you eat of certain foods. You may want to work with a dietitian or a certified diabetes educator (CDE) to help you plan meals and snacks. A dietitian or CDE can also help you lose weight if that is one of your goals. What should you know about eating carbs?   Managing the amount of carbohydrate (carbs) you eat is an important part of healthy meals when you have diabetes. Carbohydrate is found in many foods. · Learn which foods have carbs. And learn the amounts of carbs in different foods. ? Bread, cereal, pasta, and rice have about 15 grams of carbs in a serving. A serving is 1 slice of bread (1 ounce), ½ cup of cooked cereal, or 1/3 cup of cooked pasta or rice. ? Fruits have 15 grams of carbs in a serving. A serving is 1 small fresh fruit, such as an apple or orange; ½ of a banana; ½ cup of cooked or canned fruit; ½ cup of fruit juice; 1 cup of melon or raspberries; or 2 tablespoons of dried fruit. ? Milk and no-sugar-added yogurt have 15 grams of carbs in a serving. A serving is 1 cup of milk or 2/3 cup of no-sugar-added yogurt. ? Starchy vegetables have 15 grams of carbs in a serving. A serving is ½ cup of mashed potatoes or sweet potato; 1 cup winter squash; ½ of a small baked potato; ½ cup of cooked beans; or ½ cup cooked corn or green peas. · Learn how much carbs to eat each day and at each meal. A dietitian or CDE can teach you how to keep track of the amount of carbs you eat. This is called carbohydrate counting. · If you are not sure how to count carbohydrate grams, use the Plate Method to plan meals. It is a good, quick way to make sure that you have a balanced meal. It also helps you spread carbs throughout the day. ? Divide your plate by types of foods. Put non-starchy vegetables on half the plate, meat or other protein food on one-quarter of the plate, and a grain or starchy vegetable in the final quarter of the plate. To this you can add a small piece of fruit and 1 cup of milk or yogurt, depending on how many carbs you are supposed to eat at a meal.  · Try to eat about the same amount of carbs at each meal. Do not \"save up\" your daily allowance of carbs to eat at one meal.  · Proteins have very little or no carbs per serving.  Examples of proteins are beef, chicken, turkey, fish, eggs, tofu, cheese, cottage cheese, and peanut butter. A serving size of meat is 3 ounces, which is about the size of a deck of cards. Examples of meat substitute serving sizes (equal to 1 ounce of meat) are 1/4 cup of cottage cheese, 1 egg, 1 tablespoon of peanut butter, and ½ cup of tofu. How can you eat out and still eat healthy? · Learn to estimate the serving sizes of foods that have carbohydrate. If you measure food at home, it will be easier to estimate the amount in a serving of restaurant food. · If the meal you order has too much carbohydrate (such as potatoes, corn, or baked beans), ask to have a low-carbohydrate food instead. Ask for a salad or green vegetables. · If you use insulin, check your blood sugar before and after eating out to help you plan how much to eat in the future. · If you eat more carbohydrate at a meal than you had planned, take a walk or do other exercise. This will help lower your blood sugar. What else should you know? · Limit saturated fat, such as the fat from meat and dairy products. This is a healthy choice because people who have diabetes are at higher risk of heart disease. So choose lean cuts of meat and nonfat or low-fat dairy products. Use olive or canola oil instead of butter or shortening when cooking. · Don't skip meals. Your blood sugar may drop too low if you skip meals and take insulin or certain medicines for diabetes. · Check with your doctor before you drink alcohol. Alcohol can cause your blood sugar to drop too low. Alcohol can also cause a bad reaction if you take certain diabetes medicines. Follow-up care is a key part of your treatment and safety. Be sure to make and go to all appointments, and call your doctor if you are having problems. It's also a good idea to know your test results and keep a list of the medicines you take. Where can you learn more? Go to http://niles-zach.info/.   Enter C513 in the search box to learn more about \"Learning About Diabetes Food Guidelines. \"  Current as of: July 25, 2018  Content Version: 12.1  © 8633-6547 [employer]. Care instructions adapted under license by "[employer] (which disclaims liability or warranty for this information). If you have questions about a medical condition or this instruction, always ask your healthcare professional. [address] any warranty or liability for your use of this information. Counting Carbohydrates: Care Instructions  Your Care Instructions    You don't have to eat special foods when you have diabetes. You just have to be careful to eat healthy foods. Carbohydrates (carbs) raise blood sugar higher and quicker than any other nutrient. Carbs are found in desserts, breads and cereals, and fruit. They're also in starchy vegetables. These include potatoes, corn, and grains such as rice and pasta. Carbs are also in milk and yogurt. The more carbs you eat at one time, the higher your blood sugar will rise. Spreading carbs all through the day helps keep your blood sugar levels within your target range. Counting carbs is one of the best ways to keep your blood sugar under control. If you use insulin, counting carbs helps you match the right amount of insulin to the number of grams of carbs in a meal. Then you can change your diet and insulin dose as needed. Testing your blood sugar several times a day can help you learn how carbs affect your blood sugar. A registered dietitian or certified diabetes educator can help you plan meals and snacks. Follow-up care is a key part of your treatment and safety. Be sure to make and go to all appointments, and call your doctor if you are having problems. It's also a good idea to know your test results and keep a list of the medicines you take. How can you care for yourself at home?   Know your daily amount of carbohydrates  Your daily amount depends on several things, such as your weight, how active you are, which diabetes medicines you take, and what your goals are for your blood sugar levels. A registered dietitian or certified diabetes educator can help you plan how many carbs to include in each meal and snack. For most adults, a guideline for the daily amount of carbs is:  · 45 to 60 grams at each meal. That's about the same as 3 to 4 carbohydrate servings. · 15 to 20 grams at each snack. That's about the same as 1 carbohydrate serving. Count carbs  Counting carbs lets you know how much rapid-acting insulin to take before you eat. If you use an insulin pump, you get a constant rate of insulin during the day. So the pump must be programmed at meals. This gives you extra insulin to cover the rise in blood sugar after meals. If you take insulin:  · Learn your own insulin-to-carb ratio. You and your diabetes health professional will figure out the ratio. You can do this by testing your blood sugar after meals. For example, you may need a certain amount of insulin for every 15 grams of carbs. · Add up the carb grams in a meal. Then you can figure out how many units of insulin to take based on your insulin-to-carb ratio. · Exercise lowers blood sugar. You can use less insulin than you would if you were not doing exercise. Keep in mind that timing matters. If you exercise within 1 hour after a meal, your body may need less insulin for that meal than it would if you exercised 3 hours after the meal. Test your blood sugar to find out how exercise affects your need for insulin. If you do or don't take insulin:  · Look at labels on packaged foods. This can tell you how many carbs are in a serving. You can also use guides from the American Diabetes Association. · Be aware of portions, or serving sizes. If a package has two servings and you eat the whole package, you need to double the number of grams of carbohydrate listed for one serving.   · Protein, fat, and fiber do not raise blood sugar as much as carbs do. If you eat a lot of these nutrients in a meal, your blood sugar will rise more slowly than it would otherwise. Eat from all food groups  · Eat at least three meals a day. · Plan meals to include food from all the food groups. The food groups include grains, fruits, dairy, proteins, and vegetables. · Talk to your dietitian or diabetes educator about ways to add limited amounts of sweets into your meal plan. · If you drink alcohol, talk to your doctor. It may not be recommended when you are taking certain diabetes medicines. Where can you learn more? Go to http://niles-zach.info/. Enter Z976 in the search box to learn more about \"Counting Carbohydrates: Care Instructions. \"  Current as of: July 25, 2018  Content Version: 12.1  © 0884-0397 [employer]. Care instructions adapted under license by [employer] (which disclaims liability or warranty for this information). If you have questions about a medical condition or this instruction, always ask your healthcare professional. [place] [address] any warranty or liability for your use of this information. Well Visit, Women 48 to 72: Care Instructions  Your Care Instructions    Physical exams can help you stay healthy. Your doctor has checked your overall health and may have suggested ways to take good care of yourself. He or she also may have recommended tests. At home, you can help prevent illness with healthy eating, regular exercise, and other steps. Follow-up care is a key part of your treatment and safety. Be sure to make and go to all appointments, and call your doctor if you are having problems. It's also a good idea to know your test results and keep a list of the medicines you take. How can you care for yourself at home? · Reach and stay at a healthy weight.  This will lower your risk for many problems, such as obesity, diabetes, heart disease, and high blood pressure. · Get at least 30 minutes of exercise on most days of the week. Walking is a good choice. You also may want to do other activities, such as running, swimming, cycling, or playing tennis or team sports. · Do not smoke. Smoking can make health problems worse. If you need help quitting, talk to your doctor about stop-smoking programs and medicines. These can increase your chances of quitting for good. · Protect your skin from too much sun. When you're outdoors from 10 a.m. to 4 p.m., stay in the shade or cover up with clothing and a hat with a wide brim. Wear sunglasses that block UV rays. Even when it's cloudy, put broad-spectrum sunscreen (SPF 30 or higher) on any exposed skin. · See a dentist one or two times a year for checkups and to have your teeth cleaned. · Wear a seat belt in the car. Follow your doctor's advice about when to have certain tests. These tests can spot problems early. · Cholesterol. Your doctor will tell you how often to have this done based on your age, family history, or other things that can increase your risk for heart attack and stroke. · Blood pressure. Have your blood pressure checked during a routine doctor visit. Your doctor will tell you how often to check your blood pressure based on your age, your blood pressure results, and other factors. · Mammogram. Ask your doctor how often you should have a mammogram, which is an X-ray of your breasts. A mammogram can spot breast cancer before it can be felt and when it is easiest to treat. · Pap test and pelvic exam. Ask your doctor how often you should have a Pap test. You may not need to have a Pap test as often as you used to. · Vision. Have your eyes checked every year or two or as often as your doctor suggests. Some experts recommend that you have yearly exams for glaucoma and other age-related eye problems starting at age 48. · Hearing. Tell your doctor if you notice any change in your hearing.  You can have tests to find out how well you hear. · Diabetes. Ask your doctor whether you should have tests for diabetes. · Colorectal cancer. Your risk for colorectal cancer gets higher as you get older. Some experts say that adults should start regular screening at age 48 and stop at age 76. Others say to start before age 48 or continue after age 76. Talk with your doctor about your risk and when to start and stop screening. · Thyroid disease. Talk to your doctor about whether to have your thyroid checked as part of a regular physical exam. Women have an increased chance of a thyroid problem. · Osteoporosis. You should begin tests for bone density at age 72. If you are younger than 72, ask your doctor whether you have factors that may increase your risk for this disease. You may want to have this test before age 72. · Heart attack and stroke risk. At least every 4 to 6 years, you should have your risk for heart attack and stroke assessed. Your doctor uses factors such as your age, blood pressure, cholesterol, and whether you smoke or have diabetes to show what your risk for a heart attack or stroke is over the next 10 years. When should you call for help? Watch closely for changes in your health, and be sure to contact your doctor if you have any problems or symptoms that concern you. Where can you learn more? Go to http://niles-zach.info/. Enter H756 in the search box to learn more about \"Well Visit, Women 50 to 72: Care Instructions. \"  Current as of: December 13, 2018  Content Version: 12.1  © 0641-5017 [employer]. Care instructions adapted under license by [employer] (which disclaims liability or warranty for this information). If you have questions about a medical condition or this instruction, always ask your healthcare professional. [place] [address] any warranty or liability for your use of this information.           Please keep your follow-up appointment with Usman Ferreira NP. Health Maintenance Due   Topic Date Due    BREAST CANCER SCRN MAMMOGRAM  02/09/2011       I have discussed the diagnosis with the patient and the intended plan as seen in the above orders. Patient is in agreement. The patient has received an after-visit summary and questions were answered concerning future plans. I have discussed medication side effects and warnings with the patient as well. Warning signs for the above conditions were discussed including when to call our office or go to the emergency room. The nurse provided the patient and/or family with advanced directive information if needed and encouraged the patient to provide a copy to the office when available.

## 2019-09-19 ENCOUNTER — OFFICE VISIT (OUTPATIENT)
Dept: INTERNAL MEDICINE CLINIC | Facility: CLINIC | Age: 59
End: 2019-09-19

## 2019-09-19 VITALS
SYSTOLIC BLOOD PRESSURE: 124 MMHG | OXYGEN SATURATION: 96 % | WEIGHT: 293 LBS | HEART RATE: 98 BPM | RESPIRATION RATE: 18 BRPM | HEIGHT: 68 IN | TEMPERATURE: 98.2 F | BODY MASS INDEX: 44.41 KG/M2 | DIASTOLIC BLOOD PRESSURE: 81 MMHG

## 2019-09-19 DIAGNOSIS — Z00.00 ANNUAL PHYSICAL EXAM: Primary | ICD-10-CM

## 2019-09-19 DIAGNOSIS — Z12.39 SCREENING FOR BREAST CANCER: ICD-10-CM

## 2019-09-19 DIAGNOSIS — R80.9 TYPE 2 DIABETES MELLITUS WITH MICROALBUMINURIA, WITHOUT LONG-TERM CURRENT USE OF INSULIN (HCC): ICD-10-CM

## 2019-09-19 DIAGNOSIS — E78.00 HYPERCHOLESTEREMIA: ICD-10-CM

## 2019-09-19 DIAGNOSIS — E66.01 OBESITY, MORBID (HCC): ICD-10-CM

## 2019-09-19 DIAGNOSIS — I10 ESSENTIAL HYPERTENSION: ICD-10-CM

## 2019-09-19 DIAGNOSIS — Z23 ENCOUNTER FOR IMMUNIZATION: ICD-10-CM

## 2019-09-19 DIAGNOSIS — E11.29 TYPE 2 DIABETES MELLITUS WITH MICROALBUMINURIA, WITHOUT LONG-TERM CURRENT USE OF INSULIN (HCC): ICD-10-CM

## 2019-09-19 RX ORDER — FLUTICASONE PROPIONATE 50 MCG
SPRAY, SUSPENSION (ML) NASAL
COMMUNITY
End: 2020-11-05

## 2019-09-19 NOTE — PROGRESS NOTES
Jaci Arceo  Identified pt with two pt identifiers(name and ). Chief Complaint   Patient presents with    Follow-up    Diabetes    Hypertension    Cholesterol Problem       Reviewed record In preparation for visit and have obtained necessary documentation. Given info on advanced directives. 1. Have you been to the ER, urgent care clinic or hospitalized since your last visit? No     2. Have you seen or consulted any other health care providers outside of the [address] [place] [address] since your last visit? Include any pap smears or colon screening. No    Vitals reviewed with provider. Health Maintenance reviewed:     Health Maintenance Due   Topic    PAP AKA CERVICAL CYTOLOGY     Shingrix Vaccine Age 50> (1 of 2)    BREAST CANCER SCRN MAMMOGRAM     FOBT Q 1 YEAR AGE 50-75     Influenza Age 5 to Adult         Wt Readings from Last 3 Encounters:   19 322 lb 9.6 oz (146.3 kg)   19 321 lb 3.2 oz (145.7 kg)      Temp Readings from Last 3 Encounters:   19 98 °F (36.7 °C) (Oral)   19 98.1 °F (36.7 °C) (Oral)      BP Readings from Last 3 Encounters:   19 130/80   19 150/80      Pulse Readings from Last 3 Encounters:   19 93   19 87      There were no vitals filed for this visit. Learning Assessment:   :     Learning Assessment 2019   PRIMARY LEARNER Patient   BARRIERS PRIMARY LEARNER NONE   CO-LEARNER CAREGIVER No   PRIMARY LANGUAGE ENGLISH   LEARNER PREFERENCE PRIMARY READING   ANSWERED BY self   RELATIONSHIP SELF        Depression Screening:   :     3 most recent PHQ Screens 2019   Little interest or pleasure in doing things Not at all   Feeling down, depressed, irritable, or hopeless Not at all   Total Score PHQ 2 0        Fall Risk Assessment:   :     No flowsheet data found. Abuse Screening:   :     No flowsheet data found. ADL Screening:   :     No flowsheet data found.

## 2019-09-19 NOTE — PATIENT INSTRUCTIONS
Learning About Diabetes Food Guidelines  Your Care Instructions    Meal planning is important to manage diabetes. It helps keep your blood sugar at a target level (which you set with your doctor). You don't have to eat special foods. You can eat what your family eats, including sweets once in a while. But you do have to pay attention to how often you eat and how much you eat of certain foods. You may want to work with a dietitian or a certified diabetes educator (CDE) to help you plan meals and snacks. A dietitian or CDE can also help you lose weight if that is one of your goals. What should you know about eating carbs? Managing the amount of carbohydrate (carbs) you eat is an important part of healthy meals when you have diabetes. Carbohydrate is found in many foods. · Learn which foods have carbs. And learn the amounts of carbs in different foods. ? Bread, cereal, pasta, and rice have about 15 grams of carbs in a serving. A serving is 1 slice of bread (1 ounce), ½ cup of cooked cereal, or 1/3 cup of cooked pasta or rice. ? Fruits have 15 grams of carbs in a serving. A serving is 1 small fresh fruit, such as an apple or orange; ½ of a banana; ½ cup of cooked or canned fruit; ½ cup of fruit juice; 1 cup of melon or raspberries; or 2 tablespoons of dried fruit. ? Milk and no-sugar-added yogurt have 15 grams of carbs in a serving. A serving is 1 cup of milk or 2/3 cup of no-sugar-added yogurt. ? Starchy vegetables have 15 grams of carbs in a serving. A serving is ½ cup of mashed potatoes or sweet potato; 1 cup winter squash; ½ of a small baked potato; ½ cup of cooked beans; or ½ cup cooked corn or green peas. · Learn how much carbs to eat each day and at each meal. A dietitian or CDE can teach you how to keep track of the amount of carbs you eat. This is called carbohydrate counting. · If you are not sure how to count carbohydrate grams, use the Plate Method to plan meals.  It is a good, quick way to make sure that you have a balanced meal. It also helps you spread carbs throughout the day. ? Divide your plate by types of foods. Put non-starchy vegetables on half the plate, meat or other protein food on one-quarter of the plate, and a grain or starchy vegetable in the final quarter of the plate. To this you can add a small piece of fruit and 1 cup of milk or yogurt, depending on how many carbs you are supposed to eat at a meal.  · Try to eat about the same amount of carbs at each meal. Do not \"save up\" your daily allowance of carbs to eat at one meal.  · Proteins have very little or no carbs per serving. Examples of proteins are beef, chicken, turkey, fish, eggs, tofu, cheese, cottage cheese, and peanut butter. A serving size of meat is 3 ounces, which is about the size of a deck of cards. Examples of meat substitute serving sizes (equal to 1 ounce of meat) are 1/4 cup of cottage cheese, 1 egg, 1 tablespoon of peanut butter, and ½ cup of tofu. How can you eat out and still eat healthy? · Learn to estimate the serving sizes of foods that have carbohydrate. If you measure food at home, it will be easier to estimate the amount in a serving of restaurant food. · If the meal you order has too much carbohydrate (such as potatoes, corn, or baked beans), ask to have a low-carbohydrate food instead. Ask for a salad or green vegetables. · If you use insulin, check your blood sugar before and after eating out to help you plan how much to eat in the future. · If you eat more carbohydrate at a meal than you had planned, take a walk or do other exercise. This will help lower your blood sugar. What else should you know? · Limit saturated fat, such as the fat from meat and dairy products. This is a healthy choice because people who have diabetes are at higher risk of heart disease. So choose lean cuts of meat and nonfat or low-fat dairy products.  Use olive or canola oil instead of butter or shortening when cooking. · Don't skip meals. Your blood sugar may drop too low if you skip meals and take insulin or certain medicines for diabetes. · Check with your doctor before you drink alcohol. Alcohol can cause your blood sugar to drop too low. Alcohol can also cause a bad reaction if you take certain diabetes medicines. Follow-up care is a key part of your treatment and safety. Be sure to make and go to all appointments, and call your doctor if you are having problems. It's also a good idea to know your test results and keep a list of the medicines you take. Where can you learn more? Go to http://niles[employer]P [employer]ach.info/. Enter B471 in the search box to learn more about \"Learning About Diabetes Food Guidelines. \"  Current as of: July 25, 2018  Content Version: 12.1  © 2151-7693 [employer]. Care instructions adapted under license by [employer] (which disclaims liability or warranty for this information). If you have questions about a medical condition or this instruction, always ask your healthcare professional. [place] [address] any warranty or liability for your use of this information. Vaccine Information Statement    Influenza (Flu) Vaccine (Inactivated or Recombinant): What You Need to Know    Many Vaccine Information Statements are available in [place] and other languages. See www.immunize.org/vis  Hojas de información sobre vacunas están disponibles en español y en muchos otros idiomas. Visite www.immunize.org/vis    1. Why get vaccinated? Influenza vaccine can prevent influenza (flu). Flu is a contagious disease that spreads around the [place] every year, usually between October and May. Anyone can get the flu, but it is more dangerous for some people.  Infants and young children, people 72years of age and older, pregnant women, and people with certain health conditions or a weakened immune system are at greatest risk of flu complications. Pneumonia, bronchitis, sinus infections and ear infections are examples of flu-related complications. If you have a medical condition, such as heart disease, cancer or diabetes, flu can make it worse. Flu can cause fever and chills, sore throat, muscle aches, fatigue, cough, headache, and runny or stuffy nose. Some people may have vomiting and diarrhea, though this is more common in children than adults. Each year thousands of people in the [facility] die from flu, and many more are hospitalized. Flu vaccine prevents millions of illnesses and flu-related visits to the doctor each year. 2. Influenza vaccines     CDC recommends everyone 10months of age and older get vaccinated every flu season. Children 6 months through 6years of age may need 2 doses during a single flu season. Everyone else needs only 1 dose each flu season. It takes about 2 weeks for protection to develop after vaccination. There are many flu viruses, and they are always changing. Each year a new flu vaccine is made to protect against three or four viruses that are likely to cause disease in the upcoming flu season. Even when the vaccine doesnt exactly match these viruses, it may still provide some protection. Influenza vaccine does not cause flu. Influenza vaccine may be given at the same time as other vaccines. 3. Talk with your health care provider    Tell your vaccine provider if the person getting the vaccine:   Has had an allergic reaction after a previous dose of influenza vaccine, or has any severe, life-threatening allergies.  Has ever had Guillain-Barré Syndrome (also called GBS). In some cases, your health care provider may decide to postpone influenza vaccination to a future visit. People with minor illnesses, such as a cold, may be vaccinated. People who are moderately or severely ill should usually wait until they recover before getting influenza vaccine.     Your health care provider can give you more information. 4. Risks of a reaction     Soreness, redness, and swelling where shot is given, fever, muscle aches, and headache can happen after influenza vaccine.  There may be a very small increased risk of Guillain-Barré Syndrome (GBS) after inactivated influenza vaccine (the flu shot). Brenton Schaumann children who get the flu shot along with pneumococcal vaccine (PCV13), and/or DTaP vaccine at the same time might be slightly more likely to have a seizure caused by fever. Tell your health care provider if a child who is getting flu vaccine has ever had a seizure. People sometimes faint after medical procedures, including vaccination. Tell your provider if you feel dizzy or have vision changes or ringing in the ears. As with any medicine, there is a very remote chance of a vaccine causing a severe allergic reaction, other serious injury, or death. 5. What if there is a serious problem? An allergic reaction could occur after the vaccinated person leaves the clinic. If you see signs of a severe allergic reaction (hives, swelling of the face and throat, difficulty breathing, a fast heartbeat, dizziness, or weakness), call 9-1-1 and get the person to the nearest hospital.    For other signs that concern you, call your health care provider. Adverse reactions should be reported to the Vaccine Adverse Event Reporting System (VAERS). Your health care provider will usually file this report, or you can do it yourself. Visit the VAERS website at www.vaers. hhs.gov or call 7-984.138.3374. VAERS is only for reporting reactions, and VAERS staff do not give medical advice. 6. The National Vaccine Injury Compensation Program    The [facility] Vaccine Injury Compensation Program (VICP) is a federal program that was created to compensate people who may have been injured by certain vaccines.  Visit the VICP website at www.hrsa.gov/vaccinecompensation or call 0-303.754.1454 to learn about the program and about filing a claim. There is a time limit to file a claim for compensation. 7. How can I learn more?  Ask your health care provider.  Call your local or state health department.  Contact the Centers for Disease Control and Prevention (CDC):  - Call 3-847.863.9395 (1-800-CDC-INFO) or  - Visit CDCs influenza website at www.cdc.gov/flu    Vaccine Information Statement (Interim)  Inactivated Influenza Vaccine   8/15/2019  42 MAXI Juarez Beams [id number]   Department of Health and Human Services  Centers for Disease Control and Prevention    Office Use Only         Learning About Diabetes Food Guidelines  Your Care Instructions    Meal planning is important to manage diabetes. It helps keep your blood sugar at a target level (which you set with your doctor). You don't have to eat special foods. You can eat what your family eats, including sweets once in a while. But you do have to pay attention to how often you eat and how much you eat of certain foods. You may want to work with a dietitian or a certified diabetes educator (CDE) to help you plan meals and snacks. A dietitian or CDE can also help you lose weight if that is one of your goals. What should you know about eating carbs? Managing the amount of carbohydrate (carbs) you eat is an important part of healthy meals when you have diabetes. Carbohydrate is found in many foods. · Learn which foods have carbs. And learn the amounts of carbs in different foods. ? Bread, cereal, pasta, and rice have about 15 grams of carbs in a serving. A serving is 1 slice of bread (1 ounce), ½ cup of cooked cereal, or 1/3 cup of cooked pasta or rice. ? Fruits have 15 grams of carbs in a serving. A serving is 1 small fresh fruit, such as an apple or orange; ½ of a banana; ½ cup of cooked or canned fruit; ½ cup of fruit juice; 1 cup of melon or raspberries; or 2 tablespoons of dried fruit. ? Milk and no-sugar-added yogurt have 15 grams of carbs in a serving.  A serving is 1 cup of milk or 2/3 cup of no-sugar-added yogurt. ? Starchy vegetables have 15 grams of carbs in a serving. A serving is ½ cup of mashed potatoes or sweet potato; 1 cup winter squash; ½ of a small baked potato; ½ cup of cooked beans; or ½ cup cooked corn or green peas. · Learn how much carbs to eat each day and at each meal. A dietitian or CDE can teach you how to keep track of the amount of carbs you eat. This is called carbohydrate counting. · If you are not sure how to count carbohydrate grams, use the Plate Method to plan meals. It is a good, quick way to make sure that you have a balanced meal. It also helps you spread carbs throughout the day. ? Divide your plate by types of foods. Put non-starchy vegetables on half the plate, meat or other protein food on one-quarter of the plate, and a grain or starchy vegetable in the final quarter of the plate. To this you can add a small piece of fruit and 1 cup of milk or yogurt, depending on how many carbs you are supposed to eat at a meal.  · Try to eat about the same amount of carbs at each meal. Do not \"save up\" your daily allowance of carbs to eat at one meal.  · Proteins have very little or no carbs per serving. Examples of proteins are beef, chicken, turkey, fish, eggs, tofu, cheese, cottage cheese, and peanut butter. A serving size of meat is 3 ounces, which is about the size of a deck of cards. Examples of meat substitute serving sizes (equal to 1 ounce of meat) are 1/4 cup of cottage cheese, 1 egg, 1 tablespoon of peanut butter, and ½ cup of tofu. How can you eat out and still eat healthy? · Learn to estimate the serving sizes of foods that have carbohydrate. If you measure food at home, it will be easier to estimate the amount in a serving of restaurant food. · If the meal you order has too much carbohydrate (such as potatoes, corn, or baked beans), ask to have a low-carbohydrate food instead. Ask for a salad or green vegetables.   · If you use insulin, check your blood sugar before and after eating out to help you plan how much to eat in the future. · If you eat more carbohydrate at a meal than you had planned, take a walk or do other exercise. This will help lower your blood sugar. What else should you know? · Limit saturated fat, such as the fat from meat and dairy products. This is a healthy choice because people who have diabetes are at higher risk of heart disease. So choose lean cuts of meat and nonfat or low-fat dairy products. Use olive or canola oil instead of butter or shortening when cooking. · Don't skip meals. Your blood sugar may drop too low if you skip meals and take insulin or certain medicines for diabetes. · Check with your doctor before you drink alcohol. Alcohol can cause your blood sugar to drop too low. Alcohol can also cause a bad reaction if you take certain diabetes medicines. Follow-up care is a key part of your treatment and safety. Be sure to make and go to all appointments, and call your doctor if you are having problems. It's also a good idea to know your test results and keep a list of the medicines you take. Where can you learn more? Go to http://niles-zach.info/. Enter E153 in the search box to learn more about \"Learning About Diabetes Food Guidelines. \"  Current as of: July 25, 2018  Content Version: 12.1  © 4245-1370 [employer]. Care instructions adapted under license by [employer] (which disclaims liability or warranty for this information). If you have questions about a medical condition or this instruction, always ask your healthcare professional. [place] [address] any warranty or liability for your use of this information. Counting Carbohydrates: Care Instructions  Your Care Instructions    You don't have to eat special foods when you have diabetes. You just have to be careful to eat healthy foods.  Carbohydrates (carbs) raise blood sugar higher and quicker than any other nutrient. Carbs are found in desserts, breads and cereals, and fruit. They're also in starchy vegetables. These include potatoes, corn, and grains such as rice and pasta. Carbs are also in milk and yogurt. The more carbs you eat at one time, the higher your blood sugar will rise. Spreading carbs all through the day helps keep your blood sugar levels within your target range. Counting carbs is one of the best ways to keep your blood sugar under control. If you use insulin, counting carbs helps you match the right amount of insulin to the number of grams of carbs in a meal. Then you can change your diet and insulin dose as needed. Testing your blood sugar several times a day can help you learn how carbs affect your blood sugar. A registered dietitian or certified diabetes educator can help you plan meals and snacks. Follow-up care is a key part of your treatment and safety. Be sure to make and go to all appointments, and call your doctor if you are having problems. It's also a good idea to know your test results and keep a list of the medicines you take. How can you care for yourself at home? Know your daily amount of carbohydrates  Your daily amount depends on several things, such as your weight, how active you are, which diabetes medicines you take, and what your goals are for your blood sugar levels. A registered dietitian or certified diabetes educator can help you plan how many carbs to include in each meal and snack. For most adults, a guideline for the daily amount of carbs is:  · 45 to 60 grams at each meal. That's about the same as 3 to 4 carbohydrate servings. · 15 to 20 grams at each snack. That's about the same as 1 carbohydrate serving. Count carbs  Counting carbs lets you know how much rapid-acting insulin to take before you eat. If you use an insulin pump, you get a constant rate of insulin during the day. So the pump must be programmed at meals.  This gives you extra insulin to cover the rise in blood sugar after meals. If you take insulin:  · Learn your own insulin-to-carb ratio. You and your diabetes health professional will figure out the ratio. You can do this by testing your blood sugar after meals. For example, you may need a certain amount of insulin for every 15 grams of carbs. · Add up the carb grams in a meal. Then you can figure out how many units of insulin to take based on your insulin-to-carb ratio. · Exercise lowers blood sugar. You can use less insulin than you would if you were not doing exercise. Keep in mind that timing matters. If you exercise within 1 hour after a meal, your body may need less insulin for that meal than it would if you exercised 3 hours after the meal. Test your blood sugar to find out how exercise affects your need for insulin. If you do or don't take insulin:  · Look at labels on packaged foods. This can tell you how many carbs are in a serving. You can also use guides from the American Diabetes Association. · Be aware of portions, or serving sizes. If a package has two servings and you eat the whole package, you need to double the number of grams of carbohydrate listed for one serving. · Protein, fat, and fiber do not raise blood sugar as much as carbs do. If you eat a lot of these nutrients in a meal, your blood sugar will rise more slowly than it would otherwise. Eat from all food groups  · Eat at least three meals a day. · Plan meals to include food from all the food groups. The food groups include grains, fruits, dairy, proteins, and vegetables. · Talk to your dietitian or diabetes educator about ways to add limited amounts of sweets into your meal plan. · If you drink alcohol, talk to your doctor. It may not be recommended when you are taking certain diabetes medicines. Where can you learn more? Go to http://niles-zach.info/.   Enter Q377 in the search box to learn more about \"Counting Carbohydrates: Care Instructions. \"  Current as of: July 25, 2018  Content Version: 12.1  © 4543-2899 [employer]. Care instructions adapted under license by [employer] (which disclaims liability or warranty for this information). If you have questions about a medical condition or this instruction, always ask your healthcare professional. [facility] any warranty or liability for your use of this information. Well Visit, Women 48 to 72: Care Instructions  Your Care Instructions    Physical exams can help you stay healthy. Your doctor has checked your overall health and may have suggested ways to take good care of yourself. He or she also may have recommended tests. At home, you can help prevent illness with healthy eating, regular exercise, and other steps. Follow-up care is a key part of your treatment and safety. Be sure to make and go to all appointments, and call your doctor if you are having problems. It's also a good idea to know your test results and keep a list of the medicines you take. How can you care for yourself at home? · Reach and stay at a healthy weight. This will lower your risk for many problems, such as obesity, diabetes, heart disease, and high blood pressure. · Get at least 30 minutes of exercise on most days of the week. Walking is a good choice. You also may want to do other activities, such as running, swimming, cycling, or playing tennis or team sports. · Do not smoke. Smoking can make health problems worse. If you need help quitting, talk to your doctor about stop-smoking programs and medicines. These can increase your chances of quitting for good. · Protect your skin from too much sun. When you're outdoors from 10 a.m. to 4 p.m., stay in the shade or cover up with clothing and a hat with a wide brim. Wear sunglasses that block UV rays.  Even when it's cloudy, put broad-spectrum sunscreen (SPF 30 or higher) on any exposed skin.  · See a dentist one or two times a year for checkups and to have your teeth cleaned. · Wear a seat belt in the car. Follow your doctor's advice about when to have certain tests. These tests can spot problems early. · Cholesterol. Your doctor will tell you how often to have this done based on your age, family history, or other things that can increase your risk for heart attack and stroke. · Blood pressure. Have your blood pressure checked during a routine doctor visit. Your doctor will tell you how often to check your blood pressure based on your age, your blood pressure results, and other factors. · Mammogram. Ask your doctor how often you should have a mammogram, which is an X-ray of your breasts. A mammogram can spot breast cancer before it can be felt and when it is easiest to treat. · Pap test and pelvic exam. Ask your doctor how often you should have a Pap test. You may not need to have a Pap test as often as you used to. · Vision. Have your eyes checked every year or two or as often as your doctor suggests. Some experts recommend that you have yearly exams for glaucoma and other age-related eye problems starting at age 48. · Hearing. Tell your doctor if you notice any change in your hearing. You can have tests to find out how well you hear. · Diabetes. Ask your doctor whether you should have tests for diabetes. · Colorectal cancer. Your risk for colorectal cancer gets higher as you get older. Some experts say that adults should start regular screening at age 48 and stop at age 76. Others say to start before age 48 or continue after age 76. Talk with your doctor about your risk and when to start and stop screening. · Thyroid disease. Talk to your doctor about whether to have your thyroid checked as part of a regular physical exam. Women have an increased chance of a thyroid problem. · Osteoporosis. You should begin tests for bone density at age 72.  If you are younger than 72, ask your doctor whether you have factors that may increase your risk for this disease. You may want to have this test before age 72. · Heart attack and stroke risk. At least every 4 to 6 years, you should have your risk for heart attack and stroke assessed. Your doctor uses factors such as your age, blood pressure, cholesterol, and whether you smoke or have diabetes to show what your risk for a heart attack or stroke is over the next 10 years. When should you call for help? Watch closely for changes in your health, and be sure to contact your doctor if you have any problems or symptoms that concern you. Where can you learn more? Go to http://niles-zach.info/. Enter K382 in the search box to learn more about \"Well Visit, Women 50 to 72: Care Instructions. \"  Current as of: December 13, 2018  Content Version: 12.1  © 5840-0894 [employer], Incorporated. Care instructions adapted under license by [employer] (which disclaims liability or warranty for this information). If you have questions about a medical condition or this instruction, always ask your healthcare professional. [address] any warranty or liability for your use of this information.

## 2019-09-20 LAB
ALBUMIN SERPL-MCNC: 4.5 G/DL (ref 3.5–5.5)
ALBUMIN/GLOB SERPL: 1.7 {RATIO} (ref 1.2–2.2)
ALP SERPL-CCNC: 70 IU/L (ref 39–117)
ALT SERPL-CCNC: 7 IU/L (ref 0–32)
AST SERPL-CCNC: 10 IU/L (ref 0–40)
BILIRUB SERPL-MCNC: 0.3 MG/DL (ref 0–1.2)
BUN SERPL-MCNC: 18 MG/DL (ref 6–24)
BUN/CREAT SERPL: 20 (ref 9–23)
CALCIUM SERPL-MCNC: 9.8 MG/DL (ref 8.7–10.2)
CHLORIDE SERPL-SCNC: 101 MMOL/L (ref 96–106)
CHOLEST SERPL-MCNC: 214 MG/DL (ref 100–199)
CO2 SERPL-SCNC: 23 MMOL/L (ref 20–29)
CREAT SERPL-MCNC: 0.89 MG/DL (ref 0.57–1)
EST. AVERAGE GLUCOSE BLD GHB EST-MCNC: 157 MG/DL
GLOBULIN SER CALC-MCNC: 2.6 G/DL (ref 1.5–4.5)
GLUCOSE SERPL-MCNC: 133 MG/DL (ref 65–99)
HBA1C MFR BLD: 7.1 % (ref 4.8–5.6)
HDLC SERPL-MCNC: 33 MG/DL
LDLC SERPL CALC-MCNC: 120 MG/DL (ref 0–99)
LDLC SERPL DIRECT ASSAY-MCNC: 122 MG/DL (ref 0–99)
POTASSIUM SERPL-SCNC: 4.6 MMOL/L (ref 3.5–5.2)
PROT SERPL-MCNC: 7.1 G/DL (ref 6–8.5)
SODIUM SERPL-SCNC: 141 MMOL/L (ref 134–144)
TRIGL SERPL-MCNC: 307 MG/DL (ref 0–149)
VLDLC SERPL CALC-MCNC: 61 MG/DL (ref 5–40)

## 2019-09-22 NOTE — PROGRESS NOTES
Pls let pt know A1C looks awesome! Continue her current medication regimen. Continue to check blood sugars and let office know if she is getting frequent numbers below 100 as we may need to take away the glipizide as she continues with healthy diet and exercise. Great work! Her cholesterol is not at goal and I would like her to increase the Crestor to 20mg daily. LDL goal for diabetics is less than 70.

## 2019-10-31 DIAGNOSIS — E11.8 TYPE 2 DIABETES MELLITUS WITH COMPLICATION, WITHOUT LONG-TERM CURRENT USE OF INSULIN (HCC): ICD-10-CM

## 2019-10-31 RX ORDER — EMPAGLIFLOZIN 10 MG/1
TABLET, FILM COATED ORAL
Qty: 30 TAB | Refills: 0 | Status: SHIPPED | OUTPATIENT
Start: 2019-10-31 | End: 2019-11-28 | Stop reason: SDUPTHER

## 2019-11-13 NOTE — PROGRESS NOTES
HPI  Maeve Buckner is a 63y.o. year old female patient of Arie Garcia NP who presents with c/o PAP exam.    Pt has history of has Type 2 diabetes mellitus with microalbuminuria, without long-term current use of insulin ([address] HTN (hypertension), Hypercholesteremia, and Obesity, morbid ([address] on their problem list..    Pt here for well woman with PAP. No hx of abnormal pap in the past.   She is menopausal, last period was two years ago. No abnormal bleeding or spotting. No abn vag discharge. States she is not sexually active bc it's too painful. Patient Active Problem List   Diagnosis Code    Type 2 diabetes mellitus with microalbuminuria, without long-term current use of insulin (HCC) E11.29, R80.9    HTN (hypertension) I10    Hypercholesteremia E78.00    Obesity, morbid ([address] E66.01     Past Medical History:   Diagnosis Date    Diabetes ([address]     HTN (hypertension)     Hypercholesteremia      Past Surgical History:   Procedure Laterality Date    HX  SECTION       Social History     Socioeconomic History    Marital status:      Spouse name: Not on file    Number of children: Not on file    Years of education: Not on file    Highest education level: Not on file   Tobacco Use    Smoking status: Former Smoker     Packs/day: 1.00     Types: Cigarettes     Last attempt to quit:      Years since quittin.8    Smokeless tobacco: Never Used   Substance and Sexual Activity    Alcohol use: No     Frequency: Never     Comment[facility] Drug use: No    Sexual activity: Yes     Comment: menopausal   Social History Narrative    Works at [employer].  and lives with spouse.      Family History   Problem Relation Age of Onset    Hypertension Mother     Cancer Father         lung    Heart Disease Father     Hypertension Father     Parkinson's Disease Father     Dementia Father     Bipolar Disorder Other         daughter     No Known Allergies    MEDICATIONS  Current Outpatient Medications   Medication Sig    JARDIANCE 10 mg tablet TAKE ONE TABLET BY MOUTH DAILY    fluticasone propionate (FLONASE ALLERGY RELIEF) 50 mcg/actuation nasal spray Flonase Allergy Relief 50 mcg/actuation nasal spray,suspension   Spray 1 spray every day by intranasal route.  lisinopril (PRINIVIL, ZESTRIL) 10 mg tablet Take 1 Tab by mouth daily.  multivitamin (ONE A DAY) tablet Take 1 Tab by mouth daily.  psyllium (METAMUCIL) powd Take  by mouth.  metFORMIN (GLUCOPHAGE) 1,000 mg tablet Take 1 Tab by mouth two (2) times daily (with meals).  carvedilol (COREG) 3.125 mg tablet Take 1 Tab by mouth two (2) times daily (with meals).  rosuvastatin (CRESTOR) 10 mg tablet Take 1 Tab by mouth nightly.  glipiZIDE (GLUCOTROL) 5 mg tablet Take 1 Tab by mouth two (2) times a day. No current facility-administered medications for this visit. REVIEW OF SYSTEMS  Per HPI        Visit Vitals  /74 (BP 1 Location: Left arm, BP Patient Position: Sitting)   Pulse 89   Temp 98 °F (36.7 °C) (Oral)   Resp 14   Ht 5' 8\" (1.727 m)   SpO2 96%   BMI 46.38 kg/m²         General: Well-developed, well-nourished. In no distress. A&O x 3. Head: Normocephalic, atraumatic. Eyes: Conjunctiva clear. Skin: No rashes or lesions. Musculoskeletal: Gait normal.   Psychiatric: Normal mood and affect. Behavior is normal.  VULVA: normal appearing vulva with no masses, tenderness or lesions  VAGINA: normal appearing vagina with normal color and discharge, no lesions   CERVIX: stenotic friable cervix, no discharge or lesions,  UTERUS: uterus is normal size, shape, consistency and nontender  ADNEXA: normal adnexa in size, nontender and no masses         No results found for any visits on 11/14/19. ASSESSMENT and PLAN  Diagnoses and all orders for this visit:    1.  Encounter for Papanicolaou smear for cervical cancer screening  -     PAP (IMAGE GUIDED) W/REFL HPV ALL PATHOLOGY ([id number])            Patient Instructions        Learning About Cervical Cancer Screening  What is a cervical cancer screening test?    The cervix is the lower part of the uterus that opens into the vagina. Cervical cancer screening tests check the cells on the cervix for changes that could lead to cancer. Two tests can be used to screen for cervical cancer. They may be used alone or together. A Pap test.   This test looks for changes in the cells of the cervix. Some of these cell changes could lead to cancer. A human papillomavirus (HPV) test.   This test looks for the HPV virus. Some high-risk types of HPV can cause cell changes that could lead to cervical cancer. During either test, the doctor or nurse will insert a tool called a speculum into your vagina. The speculum gently opens the vaginal walls. It allows your doctor to see inside the vagina and the cervix. He or she uses a small swab or brush to collect cell samples from your cervix. Try to schedule the test when you're not having your period. To get ready for the test, your doctor may ask you to use a condom if you have sex before the test. Your doctor may also say to avoid douches, tampons, vaginal medicines, sprays, and powders for at least a day before you have the test.  When should you have a screening test?  Talk with your doctor about cervical cancer screening. If you are a woman with an average risk for cervical cancer, your doctor will likely suggest starting screening at age 24.   Women 21 to 34  If you are in this age group, your doctor will likely suggest that you get a Pap test. If your Pap test results are normal, you can wait 3 years to have another test.  Women 27 to 59  Screening options for women in this age group may include:  · A Pap test. If your results are normal, you can wait 3 years to have another test.  · An HPV test. If your results are normal, you can wait 5 years to have another test.  · A Pap test and an HPV test. Having both tests is called co-testing. If your results are normal, you can wait 5 years to be tested again. Women 72 and older  · If you are age 72 or older, talk with your doctor about what's right for you. Women ages 72 and older may no longer need to be screened for cervical cancer. When to stop having screening tests depends on your medical history, your overall health, and your risk of cervical cell changes or cervical cancer. What happens after the test?  The sample of cells taken during your test will be sent to a lab so that an expert can look at the cells. It usually takes a week or two to get the results back. Pap tests  · A normal result means that the test didn't find any abnormal cells in the sample. · An abnormal result can mean many things. Most of these aren't cancer. The results of your test may be abnormal because:  ? You have an infection of the vagina or cervix, such as a yeast infection. ? You have low estrogen levels after menopause that are causing the cells to change. ? You have cell changes that may be a sign of precancer or cancer. The results are ranked based on how serious the changes might be. HPV tests  · A negative result means that the test didn't find any high-risk HPV in the sample. · A positive HPV test means that at least one type of high-risk HPV was found. It doesn't mean that you have cervical cancer, but it increases your chance of having cell changes that could lead to cancer. Follow-up care is a key part of your treatment and safety. Be sure to make and go to all appointments, and call your doctor if you are having problems. It's also a good idea to know your test results and keep a list of the medicines you take. Where can you learn more? Go to http://niles-zach.info/. Enter P919 in the search box to learn more about \"Learning About Cervical Cancer Screening. \"  Current as of: December 19, 2018  Content Version: 12.2  © 9847-6302 [employer]. Care instructions adapted under license by [employer] (which disclaims liability or warranty for this information). If you have questions about a medical condition or this instruction, always ask your healthcare professional. [address]  any warranty or liability for your use of this information. Please keep your follow-up appointment with Lenka Peña NP. Health Maintenance Due   Topic Date Due    PAP AKA CERVICAL CYTOLOGY  02/09/1982    BREAST CANCER SCRN MAMMOGRAM  02/09/2011       I have discussed the diagnosis with the patient and the intended plan as seen in the above orders. Patient is in agreement. The patient has received an after-visit summary and questions were answered concerning future plans. I have discussed medication side effects and warnings with the patient as well. Warning signs for the above conditions were discussed including when to call our office or go to the emergency room. The nurse provided the patient and/or family with advanced directive information if needed and encouraged the patient to provide a copy to the office when available.

## 2019-11-14 ENCOUNTER — OFFICE VISIT (OUTPATIENT)
Dept: INTERNAL MEDICINE CLINIC | Facility: CLINIC | Age: 59
End: 2019-11-14

## 2019-11-14 VITALS
OXYGEN SATURATION: 96 % | TEMPERATURE: 98 F | BODY MASS INDEX: 46.38 KG/M2 | HEIGHT: 68 IN | HEART RATE: 89 BPM | SYSTOLIC BLOOD PRESSURE: 125 MMHG | DIASTOLIC BLOOD PRESSURE: 74 MMHG | RESPIRATION RATE: 14 BRPM

## 2019-11-14 DIAGNOSIS — Z12.4 ENCOUNTER FOR PAPANICOLAOU SMEAR FOR CERVICAL CANCER SCREENING: Primary | ICD-10-CM

## 2019-11-14 NOTE — PATIENT INSTRUCTIONS
Learning About Cervical Cancer Screening  What is a cervical cancer screening test?    The cervix is the lower part of the uterus that opens into the vagina. Cervical cancer screening tests check the cells on the cervix for changes that could lead to cancer. Two tests can be used to screen for cervical cancer. They may be used alone or together. A Pap test.   This test looks for changes in the cells of the cervix. Some of these cell changes could lead to cancer. A human papillomavirus (HPV) test.   This test looks for the HPV virus. Some high-risk types of HPV can cause cell changes that could lead to cervical cancer. During either test, the doctor or nurse will insert a tool called a speculum into your vagina. The speculum gently opens the vaginal walls. It allows your doctor to see inside the vagina and the cervix. He or she uses a small swab or brush to collect cell samples from your cervix. Try to schedule the test when you're not having your period. To get ready for the test, your doctor may ask you to use a condom if you have sex before the test. Your doctor may also say to avoid douches, tampons, vaginal medicines, sprays, and powders for at least a day before you have the test.  When should you have a screening test?  Talk with your doctor about cervical cancer screening. If you are a woman with an average risk for cervical cancer, your doctor will likely suggest starting screening at age 24. Women 21 to 34  If you are in this age group, your doctor will likely suggest that you get a Pap test. If your Pap test results are normal, you can wait 3 years to have another test.  Women 27 to 59  Screening options for women in this age group may include:  · A Pap test. If your results are normal, you can wait 3 years to have another test.  · An HPV test. If your results are normal, you can wait 5 years to have another test.  · A Pap test and an HPV test. Having both tests is called co-testing.  If your results are normal, you can wait 5 years to be tested again. Women 72 and older  · If you are age 72 or older, talk with your doctor about what's right for you. Women ages 72 and older may no longer need to be screened for cervical cancer. When to stop having screening tests depends on your medical history, your overall health, and your risk of cervical cell changes or cervical cancer. What happens after the test?  The sample of cells taken during your test will be sent to a lab so that an expert can look at the cells. It usually takes a week or two to get the results back. Pap tests  · A normal result means that the test didn't find any abnormal cells in the sample. · An abnormal result can mean many things. Most of these aren't cancer. The results of your test may be abnormal because:  ? You have an infection of the vagina or cervix, such as a yeast infection. ? You have low estrogen levels after menopause that are causing the cells to change. ? You have cell changes that may be a sign of precancer or cancer. The results are ranked based on how serious the changes might be. HPV tests  · A negative result means that the test didn't find any high-risk HPV in the sample. · A positive HPV test means that at least one type of high-risk HPV was found. It doesn't mean that you have cervical cancer, but it increases your chance of having cell changes that could lead to cancer. Follow-up care is a key part of your treatment and safety. Be sure to make and go to all appointments, and call your doctor if you are having problems. It's also a good idea to know your test results and keep a list of the medicines you take. Where can you learn more? Go to http://niles-zach.info/. Enter P919 in the search box to learn more about \"Learning About Cervical Cancer Screening. \"  Current as of: December 19, 2018  Content Version: 12.2  © 8721-1799 [employer].  Care instructions adapted under license by [address] (which disclaims liability or warranty for this information). If you have questions about a medical condition or this instruction, always ask your healthcare professional. [address] any warranty or liability for your use of this information.

## 2019-11-14 NOTE — PROGRESS NOTES
Elkin Vargas  Identified pt with two pt identifiers(name and ). Chief Complaint   Patient presents with    Well Woman     Room 4B// NON fasting        1. Have you been to the ER, urgent care clinic since your last visit? Hospitalized since your last visit? NO    2. Have you seen or consulted any other health care providers outside of the [address] [place] [address] since your last visit? Include any pap smears or colon screening. NO      Provider notified of reason for visit, vitals and flowsheets obtained on patients. Patient received paperwork for advance directive during previous visit but has not completed at this time     Reviewed record In preparation for visit, huddled with provider and have obtained necessary documentation      Health Maintenance Due   Topic    PAP AKA CERVICAL CYTOLOGY     BREAST CANCER SCRN MAMMOGRAM        Wt Readings from Last 3 Encounters:   19 305 lb (138.3 kg)   19 322 lb 9.6 oz (146.3 kg)   19 321 lb 3.2 oz (145.7 kg)     Temp Readings from Last 3 Encounters:   19 98.2 °F (36.8 °C) (Oral)   19 98 °F (36.7 °C) (Oral)   19 98.1 °F (36.7 °C) (Oral)     BP Readings from Last 3 Encounters:   19 124/81   19 130/80   19 150/80     Pulse Readings from Last 3 Encounters:   19 98   19 93   19 87     Vitals:    19 1416   Resp: 14   Height: 5' 8\" (1.727 m)   PainSc:   0 - No pain         Learning Assessment:  :     Learning Assessment 2019   PRIMARY LEARNER Patient   BARRIERS PRIMARY LEARNER NONE   CO-LEARNER CAREGIVER No   PRIMARY LANGUAGE ENGLISH   LEARNER PREFERENCE PRIMARY READING   ANSWERED BY self   RELATIONSHIP SELF       Depression Screening:  :     3 most recent PHQ Screens 2019   Little interest or pleasure in doing things Not at all   Feeling down, depressed, irritable, or hopeless Not at all   Total Score PHQ 2 0       Fall Risk Assessment:  :     No flowsheet data found.     Abuse Screening:  :     No flowsheet data found. ADL Screening:  :     No flowsheet data found. Medication reconciliation up to date and corrected with patient at this time.

## 2019-11-19 LAB
CYTOLOGIST CVX/VAG CYTO: NORMAL
CYTOLOGY CVX/VAG DOC CYTO: NORMAL
CYTOLOGY CVX/VAG DOC THIN PREP: NORMAL
DX ICD CODE: NORMAL
LABCORP, 190119: NORMAL
Lab: NORMAL
Lab: NORMAL
OTHER STN SPEC: NORMAL
STAT OF ADQ CVX/VAG CYTO-IMP: NORMAL

## 2019-11-28 DIAGNOSIS — E11.8 TYPE 2 DIABETES MELLITUS WITH COMPLICATION, WITHOUT LONG-TERM CURRENT USE OF INSULIN (HCC): ICD-10-CM

## 2019-11-29 RX ORDER — EMPAGLIFLOZIN 10 MG/1
TABLET, FILM COATED ORAL
Qty: 30 TAB | Refills: 0 | Status: SHIPPED | OUTPATIENT
Start: 2019-11-29 | End: 2020-01-02

## 2020-01-02 DIAGNOSIS — E11.8 TYPE 2 DIABETES MELLITUS WITH COMPLICATION, WITHOUT LONG-TERM CURRENT USE OF INSULIN (HCC): ICD-10-CM

## 2020-01-02 RX ORDER — EMPAGLIFLOZIN 10 MG/1
TABLET, FILM COATED ORAL
Qty: 30 TAB | Refills: 0 | Status: SHIPPED | OUTPATIENT
Start: 2020-01-02 | End: 2020-02-06

## 2020-02-10 DIAGNOSIS — I10 ESSENTIAL HYPERTENSION: ICD-10-CM

## 2020-02-10 RX ORDER — CARVEDILOL 3.12 MG/1
TABLET ORAL
Qty: 180 TAB | Refills: 4 | Status: SHIPPED | OUTPATIENT
Start: 2020-02-10 | End: 2021-05-07

## 2020-02-18 ENCOUNTER — OFFICE VISIT (OUTPATIENT)
Dept: INTERNAL MEDICINE CLINIC | Facility: CLINIC | Age: 60
End: 2020-02-18

## 2020-02-18 VITALS
WEIGHT: 293 LBS | TEMPERATURE: 98.6 F | SYSTOLIC BLOOD PRESSURE: 130 MMHG | HEIGHT: 68 IN | BODY MASS INDEX: 44.41 KG/M2 | OXYGEN SATURATION: 96 % | DIASTOLIC BLOOD PRESSURE: 80 MMHG | HEART RATE: 101 BPM | RESPIRATION RATE: 16 BRPM

## 2020-02-18 DIAGNOSIS — Z12.83 SKIN CANCER SCREENING: ICD-10-CM

## 2020-02-18 DIAGNOSIS — E66.01 OBESITY, MORBID (HCC): ICD-10-CM

## 2020-02-18 DIAGNOSIS — R80.9 TYPE 2 DIABETES MELLITUS WITH MICROALBUMINURIA, WITHOUT LONG-TERM CURRENT USE OF INSULIN (HCC): Primary | ICD-10-CM

## 2020-02-18 DIAGNOSIS — I10 ESSENTIAL HYPERTENSION: ICD-10-CM

## 2020-02-18 DIAGNOSIS — E11.29 TYPE 2 DIABETES MELLITUS WITH MICROALBUMINURIA, WITHOUT LONG-TERM CURRENT USE OF INSULIN (HCC): Primary | ICD-10-CM

## 2020-02-18 LAB — HBA1C MFR BLD HPLC: 7 %

## 2020-02-18 NOTE — PROGRESS NOTES
HPI  Nay Sierra is a 62y.o. year old female patient of Marnie Sheehan NP who presents with c/o 4 mos f/u for DM, HTN, weight. Pt has history of has Type 2 diabetes mellitus with microalbuminuria, without long-term current use of insulin ([address] HTN (hypertension), Hypercholesteremia, and Obesity, morbid ([address] on their problem list..    Pt here for HTN and DM f/u. States needs to have wellness visit completed this calenadar year for her insurance. HTN  Home BP: doesn't check at home.   Medication regimen/ADR/missed doses: carvdeilol 3.125 BID, Lisinopril 10mg, reports compliance  Denies chest pain, chest pressure, or palpitations. Denies SOB, orthopnea, or PND. Gets occasional sinus HA-takes flonase which causes occ nosebleed. Denies dizziness, blurred vision, or swelling.         Diabetic ROS   medication compliance/ADR: metformin 1000 BID, glipizide 5mg BID, Jardiance-currently costing her $263/month with coupon until meets deductible   home glucose monitoring: BS have been 120s, nothing below 100, nothing in 200s  diabetic ROS: Denies lightheadedness, feeling jittery or irritable. Denies numbness/tingling in extremities. Diet: Has been good, avoiding sweets. Drinks iced tea with truvia, coffee, no sodas. Exercise: Had a fall a month ago and hurt her hip and knee- using advil, bengay which is helping. Walking 1/week since then -plans to ramp up again. Weight: Down 3lbs since last visit, Lost 20 lbs total since April. She has the order to schedule her mammogram.  Also requesting skin ca screening.     Patient Active Problem List   Diagnosis Code    Type 2 diabetes mellitus with microalbuminuria, without long-term current use of insulin (HCC) E11.29, R80.9    HTN (hypertension) I10    Hypercholesteremia E78.00    Obesity, morbid ([address] E66.01     Past Medical History:   Diagnosis Date    Diabetes ([address]     HTN (hypertension)     Hypercholesteremia      Past Surgical History:   Procedure Laterality Date    HX  SECTION       Social History     Socioeconomic History    Marital status:      Spouse name: Not on file    Number of children: Not on file    Years of education: Not on file    Highest education level: Not on file   Tobacco Use    Smoking status: Former Smoker     Packs/day: 1.00     Types: Cigarettes     Last attempt to quit:      Years since quittin.1    Smokeless tobacco: Never Used   Substance and Sexual Activity    Alcohol use: No     Frequency: Never     Comment[facility] Drug use: No    Sexual activity: Yes     Comment: menopausal   Social History Narrative    Works at [employer].  and lives with spouse. Family History   Problem Relation Age of Onset    Hypertension Mother     Cancer Father         lung    Heart Disease Father     Hypertension Father     Parkinson's Disease Father     Dementia Father     Bipolar Disorder Other         daughter     No Known Allergies    MEDICATIONS  Current Outpatient Medications   Medication Sig    carvediloL (COREG) 3.125 mg tablet TAKE ONE TABLET BY MOUTH TWICE A DAY WITH MEALS    JARDIANCE 10 mg tablet TAKE ONE TABLET BY MOUTH DAILY    fluticasone propionate (FLONASE ALLERGY RELIEF) 50 mcg/actuation nasal spray Flonase Allergy Relief 50 mcg/actuation nasal spray,suspension   Spray 1 spray every day by intranasal route.  lisinopril (PRINIVIL, ZESTRIL) 10 mg tablet Take 1 Tab by mouth daily.  multivitamin (ONE A DAY) tablet Take 1 Tab by mouth daily.  psyllium (METAMUCIL) powd Take  by mouth.  metFORMIN (GLUCOPHAGE) 1,000 mg tablet Take 1 Tab by mouth two (2) times daily (with meals).  rosuvastatin (CRESTOR) 10 mg tablet Take 1 Tab by mouth nightly. (Patient taking differently: Take 20 mg by mouth nightly.)    glipiZIDE (GLUCOTROL) 5 mg tablet Take 1 Tab by mouth two (2) times a day. No current facility-administered medications for this visit. REVIEW OF SYSTEMS  Per HPI        Visit Vitals  /80   Pulse (!) 101   Temp 98.6 °F (37 °C) (Oral)   Resp 16   Ht 5' 8\" (1.727 m)   Wt 302 lb 6.4 oz (137.2 kg)   SpO2 96%   BMI 45.98 kg/m²         General: Well-developed, well-nourished. In no distress. A&O x 3. Head: Normocephalic, atraumatic. Eyes: Conjunctiva clear. Mouth/Throat: Lips, mucosa, and tongue normal.  Neck: Supple, symmetrical, trachea midline, no lymphadenopathy, no carotid bruits, no JVD, thyroid: not enlarged, symmetric, no tenderness/mass/nodules. Lungs: Clear to auscultation bilaterally. No crackles or wheezes. No use of accessory muscles. Speaks in full sentences without SOB. Chest Wall: No tenderness or deformity. Heart: RRR, normal S1 and S2, no murmur, click, rub, or gallop. Skin: No rashes or lesions. Neurovasc: No edema appreciated. Dorsalis pedis pulses are 2+ on the right side, and 2+ on the left side. Posterior tibial pulses are 2+ on the right side, and 2+ on the left side. Musculoskeletal: Gait normal.   Psychiatric: Normal mood and affect. Behavior is normal.       No results found for any visits on 02/18/20. Lab Results   Component Value Date/Time    Sodium 141 09/19/2019 03:04 PM    Potassium 4.6 09/19/2019 03:04 PM    Chloride 101 09/19/2019 03:04 PM    CO2 23 09/19/2019 03:04 PM    Glucose 133 (H) 09/19/2019 03:04 PM    BUN 18 09/19/2019 03:04 PM    Creatinine 0.89 09/19/2019 03:04 PM    BUN/Creatinine ratio 20 09/19/2019 03:04 PM    GFR est AA 83 09/19/2019 03:04 PM    GFR est non-AA 72 09/19/2019 03:04 PM    Calcium 9.8 09/19/2019 03:04 PM    Bilirubin, total 0.3 09/19/2019 03:04 PM    AST (SGOT) 10 09/19/2019 03:04 PM    Alk.  phosphatase 70 09/19/2019 03:04 PM    Protein, total 7.1 09/19/2019 03:04 PM    Albumin 4.5 09/19/2019 03:04 PM    A-G Ratio 1.7 09/19/2019 03:04 PM    ALT (SGPT) 7 09/19/2019 03:04 PM     Lab Results   Component Value Date/Time    Hemoglobin A1c 7.1 (H) 09/19/2019 03:04 PM    Hemoglobin A1c (POC) 7.0 02/18/2020 09:55 AM     Lab Results   Component Value Date/Time    Cholesterol, total 214 (H) 09/19/2019 03:04 PM    HDL Cholesterol 33 (L) 09/19/2019 03:04 PM    LDL,Direct 122 (H) 09/19/2019 03:04 PM    LDL, calculated 120 (H) 09/19/2019 03:04 PM    VLDL, calculated 61 (H) 09/19/2019 03:04 PM    Triglyceride 307 (H) 09/19/2019 03:04 PM         ASSESSMENT and PLAN  Diagnoses and all orders for this visit:    1. Type 2 diabetes mellitus with microalbuminuria, without long-term current use of insulin (HCC)  -     AMB POC HEMOGLOBIN A1C  DM well controlled, continue current medications, healthy diet, increase exercise to 150mins/week    2. Essential hypertension  Within goal, cont current regimen    3. Skin cancer screening  -     REFERRAL TO DERMATOLOGY    4. Obesity, morbid ([address]  Congratulated pt on her weight loss, encouraged her to continue healthy diet and exercise as above          Patient Instructions     Continue to check home blood sugar- notify me if blood sugars are routinely less than 100. High Blood Pressure: Care Instructions  Overview    It's normal for blood pressure to go up and down throughout the day. But if it stays up, you have high blood pressure. Another name for high blood pressure is hypertension. Despite what a lot of people think, high blood pressure usually doesn't cause headaches or make you feel dizzy or lightheaded. It usually has no symptoms. But it does increase your risk of stroke, heart attack, and other problems. You and your doctor will talk about your risks of these problems based on your blood pressure. Your doctor will give you a goal for your blood pressure. Your goal will be based on your health and your age. Lifestyle changes, such as eating healthy and being active, are always important to help lower blood pressure.  You might also take medicine to reach your blood pressure goal.  Follow-up care is a key part of your treatment and safety. Be sure to make and go to all appointments, and call your doctor if you are having problems. It's also a good idea to know your test results and keep a list of the medicines you take. How can you care for yourself at home? Medical treatment  · If you stop taking your medicine, your blood pressure will go back up. You may take one or more types of medicine to lower your blood pressure. Be safe with medicines. Take your medicine exactly as prescribed. Call your doctor if you think you are having a problem with your medicine. · Talk to your doctor before you start taking aspirin every day. Aspirin can help certain people lower their risk of a heart attack or stroke. But taking aspirin isn't right for everyone, because it can cause serious bleeding. · See your doctor regularly. You may need to see the doctor more often at first or until your blood pressure comes down. · If you are taking blood pressure medicine, talk to your doctor before you take decongestants or anti-inflammatory medicine, such as ibuprofen. Some of these medicines can raise blood pressure. · Learn how to check your blood pressure at home. Lifestyle changes  · Stay at a healthy weight. This is especially important if you put on weight around the waist. Losing even 10 pounds can help you lower your blood pressure. · If your doctor recommends it, get more exercise. Walking is a good choice. Bit by bit, increase the amount you walk every day. Try for at least 30 minutes on most days of the week. You also may want to swim, bike, or do other activities. · Avoid or limit alcohol. Talk to your doctor about whether you can drink any alcohol. · Try to limit how much sodium you eat to less than 2,300 milligrams (mg) a day. Your doctor may ask you to try to eat less than 1,500 mg a day. · Eat plenty of fruits (such as bananas and oranges), vegetables, legumes, whole grains, and low-fat dairy products.   · Lower the amount of saturated fat in your diet. Saturated fat is found in animal products such as milk, cheese, and meat. Limiting these foods may help you lose weight and also lower your risk for heart disease. · Do not smoke. Smoking increases your risk for heart attack and stroke. If you need help quitting, talk to your doctor about stop-smoking programs and medicines. These can increase your chances of quitting for good. When should you call for help? Call  911 anytime you think you may need emergency care. This may mean having symptoms that suggest that your blood pressure is causing a serious heart or blood vessel problem. Your blood pressure may be over 180/120.   For example, call  911 if:    · You have symptoms of a heart attack. These may include:  ? Chest pain or pressure, or a strange feeling in the chest.  ? Sweating. ? Shortness of breath. ? Nausea or vomiting. ? Pain, pressure, or a strange feeling in the back, neck, jaw, or upper belly or in one or both shoulders or arms. ? Lightheadedness or sudden weakness. ? A fast or irregular heartbeat.     · You have symptoms of a stroke. These may include:  ? Sudden numbness, tingling, weakness, or loss of movement in your face, arm, or leg, especially on only one side of your body. ? Sudden vision changes. ? Sudden trouble speaking. ? Sudden confusion or trouble understanding simple statements. ? Sudden problems with walking or balance. ? A sudden, severe headache that is different from past headaches.     · You have severe back or belly pain.    Do not wait until your blood pressure comes down on its own.  Get help right away.   Call your doctor now or seek immediate care if:    · Your blood pressure is much higher than normal (such as 180/120 or higher), but you don't have symptoms.     · You think high blood pressure is causing symptoms, such as:  ? Severe headache.  ? Blurry vision.    Watch closely for changes in your health, and be sure to contact your doctor if:    · Your blood pressure measures higher than your doctor recommends at least 2 times. That means the top number is higher or the bottom number is higher, or both.     · You think you may be having side effects from your blood pressure medicine. Where can you learn more? Go to http://niles-zach.info/. Enter W275 in the search box to learn more about \"High Blood Pressure: Care Instructions. \"  Current as of: April 9, 2019  Content Version: 12.2  © 6000-3848 [email or web address]. Care instructions adapted under license by [employer] (which disclaims liability or warranty for this information). If you have questions about a medical condition or this instruction, always ask your healthcare professional. [address] any warranty or liability for your use of this information. Learning About Diabetes Food Guidelines  Your Care Instructions    Meal planning is important to manage diabetes. It helps keep your blood sugar at a target level (which you set with your doctor). You don't have to eat special foods. You can eat what your family eats, including sweets once in a while. But you do have to pay attention to how often you eat and how much you eat of certain foods. You may want to work with a dietitian or a certified diabetes educator (CDE) to help you plan meals and snacks. A dietitian or CDE can also help you lose weight if that is one of your goals. What should you know about eating carbs? Managing the amount of carbohydrate (carbs) you eat is an important part of healthy meals when you have diabetes. Carbohydrate is found in many foods. · Learn which foods have carbs. And learn the amounts of carbs in different foods. ? Bread, cereal, pasta, and rice have about 15 grams of carbs in a serving. A serving is 1 slice of bread (1 ounce), ½ cup of cooked cereal, or 1/3 cup of cooked pasta or rice. ? Fruits have 15 grams of carbs in a serving.  A serving is 1 small fresh fruit, such as an apple or orange; ½ of a banana; ½ cup of cooked or canned fruit; ½ cup of fruit juice; 1 cup of melon or raspberries; or 2 tablespoons of dried fruit. ? Milk and no-sugar-added yogurt have 15 grams of carbs in a serving. A serving is 1 cup of milk or 2/3 cup of no-sugar-added yogurt. ? Starchy vegetables have 15 grams of carbs in a serving. A serving is ½ cup of mashed potatoes or sweet potato; 1 cup winter squash; ½ of a small baked potato; ½ cup of cooked beans; or ½ cup cooked corn or green peas. · Learn how much carbs to eat each day and at each meal. A dietitian or CDE can teach you how to keep track of the amount of carbs you eat. This is called carbohydrate counting. · If you are not sure how to count carbohydrate grams, use the Plate Method to plan meals. It is a good, quick way to make sure that you have a balanced meal. It also helps you spread carbs throughout the day. ? Divide your plate by types of foods. Put non-starchy vegetables on half the plate, meat or other protein food on one-quarter of the plate, and a grain or starchy vegetable in the final quarter of the plate. To this you can add a small piece of fruit and 1 cup of milk or yogurt, depending on how many carbs you are supposed to eat at a meal.  · Try to eat about the same amount of carbs at each meal. Do not \"save up\" your daily allowance of carbs to eat at one meal.  · Proteins have very little or no carbs per serving. Examples of proteins are beef, chicken, turkey, fish, eggs, tofu, cheese, cottage cheese, and peanut butter. A serving size of meat is 3 ounces, which is about the size of a deck of cards. Examples of meat substitute serving sizes (equal to 1 ounce of meat) are 1/4 cup of cottage cheese, 1 egg, 1 tablespoon of peanut butter, and ½ cup of tofu. How can you eat out and still eat healthy? · Learn to estimate the serving sizes of foods that have carbohydrate.  If you measure food at home, it will be easier to estimate the amount in a serving of restaurant food. · If the meal you order has too much carbohydrate (such as potatoes, corn, or baked beans), ask to have a low-carbohydrate food instead. Ask for a salad or green vegetables. · If you use insulin, check your blood sugar before and after eating out to help you plan how much to eat in the future. · If you eat more carbohydrate at a meal than you had planned, take a walk or do other exercise. This will help lower your blood sugar. What else should you know? · Limit saturated fat, such as the fat from meat and dairy products. This is a healthy choice because people who have diabetes are at higher risk of heart disease. So choose lean cuts of meat and nonfat or low-fat dairy products. Use olive or canola oil instead of butter or shortening when cooking. · Don't skip meals. Your blood sugar may drop too low if you skip meals and take insulin or certain medicines for diabetes. · Check with your doctor before you drink alcohol. Alcohol can cause your blood sugar to drop too low. Alcohol can also cause a bad reaction if you take certain diabetes medicines. Follow-up care is a key part of your treatment and safety. Be sure to make and go to all appointments, and call your doctor if you are having problems. It's also a good idea to know your test results and keep a list of the medicines you take. Where can you learn more? Go to http://niles-zach.info/. Enter V154 in the search box to learn more about \"Learning About Diabetes Food Guidelines. \"  Current as of: April 16, 2019  Content Version: 12.2  © 8045-5606 [employer]. Care instructions adapted under license by [employer] (which disclaims liability or warranty for this information).  If you have questions about a medical condition or this instruction, always ask your healthcare professional. [employer] disclaims any warranty or liability for your use of this information. Please keep your follow-up appointment with Kun Loyd NP. Health Maintenance Due   Topic Date Due    Breast Cancer Screen Mammogram  02/09/2011    FOBT Q1Y Age 54-65  02/09/2011       I have discussed the diagnosis with the patient and the intended plan as seen in the above orders. Patient is in agreement. The patient has received an after-visit summary and questions were answered concerning future plans. I have discussed medication side effects and warnings with the patient as well. Warning signs for the above conditions were discussed including when to call our office or go to the emergency room. The nurse provided the patient and/or family with advanced directive information if needed and encouraged the patient to provide a copy to the office when available.

## 2020-02-18 NOTE — PATIENT INSTRUCTIONS
Continue to check home blood sugar- notify me if blood sugars are routinely less than 100. High Blood Pressure: Care Instructions  Overview    It's normal for blood pressure to go up and down throughout the day. But if it stays up, you have high blood pressure. Another name for high blood pressure is hypertension. Despite what a lot of people think, high blood pressure usually doesn't cause headaches or make you feel dizzy or lightheaded. It usually has no symptoms. But it does increase your risk of stroke, heart attack, and other problems. You and your doctor will talk about your risks of these problems based on your blood pressure. Your doctor will give you a goal for your blood pressure. Your goal will be based on your health and your age. Lifestyle changes, such as eating healthy and being active, are always important to help lower blood pressure. You might also take medicine to reach your blood pressure goal.  Follow-up care is a key part of your treatment and safety. Be sure to make and go to all appointments, and call your doctor if you are having problems. It's also a good idea to know your test results and keep a list of the medicines you take. How can you care for yourself at home? Medical treatment  · If you stop taking your medicine, your blood pressure will go back up. You may take one or more types of medicine to lower your blood pressure. Be safe with medicines. Take your medicine exactly as prescribed. Call your doctor if you think you are having a problem with your medicine. · Talk to your doctor before you start taking aspirin every day. Aspirin can help certain people lower their risk of a heart attack or stroke. But taking aspirin isn't right for everyone, because it can cause serious bleeding. · See your doctor regularly. You may need to see the doctor more often at first or until your blood pressure comes down.   · If you are taking blood pressure medicine, talk to your doctor before you take decongestants or anti-inflammatory medicine, such as ibuprofen. Some of these medicines can raise blood pressure. · Learn how to check your blood pressure at home. Lifestyle changes  · Stay at a healthy weight. This is especially important if you put on weight around the waist. Losing even 10 pounds can help you lower your blood pressure. · If your doctor recommends it, get more exercise. Walking is a good choice. Bit by bit, increase the amount you walk every day. Try for at least 30 minutes on most days of the week. You also may want to swim, bike, or do other activities. · Avoid or limit alcohol. Talk to your doctor about whether you can drink any alcohol. · Try to limit how much sodium you eat to less than 2,300 milligrams (mg) a day. Your doctor may ask you to try to eat less than 1,500 mg a day. · Eat plenty of fruits (such as bananas and oranges), vegetables, legumes, whole grains, and low-fat dairy products. · Lower the amount of saturated fat in your diet. Saturated fat is found in animal products such as milk, cheese, and meat. Limiting these foods may help you lose weight and also lower your risk for heart disease. · Do not smoke. Smoking increases your risk for heart attack and stroke. If you need help quitting, talk to your doctor about stop-smoking programs and medicines. These can increase your chances of quitting for good. When should you call for help? Call  911 anytime you think you may need emergency care. This may mean having symptoms that suggest that your blood pressure is causing a serious heart or blood vessel problem. Your blood pressure may be over 180/120.   For example, call  911 if:    · You have symptoms of a heart attack. These may include:  ? Chest pain or pressure, or a strange feeling in the chest.  ? Sweating. ? Shortness of breath. ? Nausea or vomiting.   ? Pain, pressure, or a strange feeling in the back, neck, jaw, or upper belly or in one or both shoulders or arms. ? Lightheadedness or sudden weakness. ? A fast or irregular heartbeat.     · You have symptoms of a stroke. These may include:  ? Sudden numbness, tingling, weakness, or loss of movement in your face, arm, or leg, especially on only one side of your body. ? Sudden vision changes. ? Sudden trouble speaking. ? Sudden confusion or trouble understanding simple statements. ? Sudden problems with walking or balance. ? A sudden, severe headache that is different from past headaches.     · You have severe back or belly pain.    Do not wait until your blood pressure comes down on its own. Get help right away.   Call your doctor now or seek immediate care if:    · Your blood pressure is much higher than normal (such as 180/120 or higher), but you don't have symptoms.     · You think high blood pressure is causing symptoms, such as:  ? Severe headache.  ? Blurry vision.    Watch closely for changes in your health, and be sure to contact your doctor if:    · Your blood pressure measures higher than your doctor recommends at least 2 times. That means the top number is higher or the bottom number is higher, or both.     · You think you may be having side effects from your blood pressure medicine. Where can you learn more? Go to http://niles-zach.info/. Enter Q787 in the search box to learn more about \"High Blood Pressure: Care Instructions. \"  Current as of: April 9, 2019  Content Version: 12.2  © 7191-0283 [employer]. Care instructions adapted under license by [employer] (which disclaims liability or warranty for this information). If you have questions about a medical condition or this instruction, always ask your healthcare professional. [address] any warranty or liability for your use of this information. Learning About Diabetes Food Guidelines  Your Care Instructions    Meal planning is important to manage diabetes.  It helps keep your blood sugar at a target level (which you set with your doctor). You don't have to eat special foods. You can eat what your family eats, including sweets once in a while. But you do have to pay attention to how often you eat and how much you eat of certain foods. You may want to work with a dietitian or a certified diabetes educator (CDE) to help you plan meals and snacks. A dietitian or CDE can also help you lose weight if that is one of your goals. What should you know about eating carbs? Managing the amount of carbohydrate (carbs) you eat is an important part of healthy meals when you have diabetes. Carbohydrate is found in many foods. · Learn which foods have carbs. And learn the amounts of carbs in different foods. ? Bread, cereal, pasta, and rice have about 15 grams of carbs in a serving. A serving is 1 slice of bread (1 ounce), ½ cup of cooked cereal, or 1/3 cup of cooked pasta or rice. ? Fruits have 15 grams of carbs in a serving. A serving is 1 small fresh fruit, such as an apple or orange; ½ of a banana; ½ cup of cooked or canned fruit; ½ cup of fruit juice; 1 cup of melon or raspberries; or 2 tablespoons of dried fruit. ? Milk and no-sugar-added yogurt have 15 grams of carbs in a serving. A serving is 1 cup of milk or 2/3 cup of no-sugar-added yogurt. ? Starchy vegetables have 15 grams of carbs in a serving. A serving is ½ cup of mashed potatoes or sweet potato; 1 cup winter squash; ½ of a small baked potato; ½ cup of cooked beans; or ½ cup cooked corn or green peas. · Learn how much carbs to eat each day and at each meal. A dietitian or CDE can teach you how to keep track of the amount of carbs you eat. This is called carbohydrate counting. · If you are not sure how to count carbohydrate grams, use the Plate Method to plan meals. It is a good, quick way to make sure that you have a balanced meal. It also helps you spread carbs throughout the day. ? Divide your plate by types of foods. Put non-starchy vegetables on half the plate, meat or other protein food on one-quarter of the plate, and a grain or starchy vegetable in the final quarter of the plate. To this you can add a small piece of fruit and 1 cup of milk or yogurt, depending on how many carbs you are supposed to eat at a meal.  · Try to eat about the same amount of carbs at each meal. Do not \"save up\" your daily allowance of carbs to eat at one meal.  · Proteins have very little or no carbs per serving. Examples of proteins are beef, chicken, turkey, fish, eggs, tofu, cheese, cottage cheese, and peanut butter. A serving size of meat is 3 ounces, which is about the size of a deck of cards. Examples of meat substitute serving sizes (equal to 1 ounce of meat) are 1/4 cup of cottage cheese, 1 egg, 1 tablespoon of peanut butter, and ½ cup of tofu. How can you eat out and still eat healthy? · Learn to estimate the serving sizes of foods that have carbohydrate. If you measure food at home, it will be easier to estimate the amount in a serving of restaurant food. · If the meal you order has too much carbohydrate (such as potatoes, corn, or baked beans), ask to have a low-carbohydrate food instead. Ask for a salad or green vegetables. · If you use insulin, check your blood sugar before and after eating out to help you plan how much to eat in the future. · If you eat more carbohydrate at a meal than you had planned, take a walk or do other exercise. This will help lower your blood sugar. What else should you know? · Limit saturated fat, such as the fat from meat and dairy products. This is a healthy choice because people who have diabetes are at higher risk of heart disease. So choose lean cuts of meat and nonfat or low-fat dairy products. Use olive or canola oil instead of butter or shortening when cooking. · Don't skip meals. Your blood sugar may drop too low if you skip meals and take insulin or certain medicines for diabetes.   · Check with your doctor before you drink alcohol. Alcohol can cause your blood sugar to drop too low. Alcohol can also cause a bad reaction if you take certain diabetes medicines. Follow-up care is a key part of your treatment and safety. Be sure to make and go to all appointments, and call your doctor if you are having problems. It's also a good idea to know your test results and keep a list of the medicines you take. Where can you learn more? Go to http://niles-azch.info/. Enter V546 in the search box to learn more about \"Learning About Diabetes Food Guidelines. \"  Current as of: April 16, 2019  Content Version: 12.2  © 1436-0543 [employer]. Care instructions adapted under license by [employer] (which disclaims liability or warranty for this information). If you have questions about a medical condition or this instruction, always ask your healthcare professional. [address] any warranty or liability for your use of this information.

## 2020-03-02 DIAGNOSIS — E11.8 TYPE 2 DIABETES MELLITUS WITH COMPLICATION, WITHOUT LONG-TERM CURRENT USE OF INSULIN (HCC): ICD-10-CM

## 2020-03-02 RX ORDER — GLIPIZIDE 5 MG/1
TABLET ORAL
Qty: 180 TAB | Refills: 4 | Status: SHIPPED | OUTPATIENT
Start: 2020-03-02 | End: 2021-01-14 | Stop reason: SDUPTHER

## 2020-04-20 DIAGNOSIS — E11.8 TYPE 2 DIABETES MELLITUS WITH COMPLICATION, WITHOUT LONG-TERM CURRENT USE OF INSULIN (HCC): ICD-10-CM

## 2020-04-20 DIAGNOSIS — E78.00 HYPERCHOLESTEREMIA: ICD-10-CM

## 2020-04-20 RX ORDER — ROSUVASTATIN CALCIUM 20 MG/1
20 TABLET, COATED ORAL
Qty: 90 TAB | Refills: 1 | Status: SHIPPED | OUTPATIENT
Start: 2020-04-20 | End: 2022-07-25 | Stop reason: SINTOL

## 2020-04-20 RX ORDER — METFORMIN HYDROCHLORIDE 1000 MG/1
TABLET ORAL
Qty: 180 TAB | Refills: 1 | Status: SHIPPED | OUTPATIENT
Start: 2020-04-20 | End: 2021-01-04 | Stop reason: SDUPTHER

## 2020-04-20 RX ORDER — ROSUVASTATIN CALCIUM 10 MG/1
TABLET, COATED ORAL
Qty: 90 TAB | Refills: 4 | OUTPATIENT
Start: 2020-04-20

## 2020-07-10 DIAGNOSIS — E11.8 TYPE 2 DIABETES MELLITUS WITH COMPLICATION, WITHOUT LONG-TERM CURRENT USE OF INSULIN (HCC): ICD-10-CM

## 2020-07-10 RX ORDER — EMPAGLIFLOZIN 10 MG/1
TABLET, FILM COATED ORAL
Qty: 30 TAB | Refills: 3 | Status: SHIPPED | OUTPATIENT
Start: 2020-07-10 | End: 2020-07-14 | Stop reason: SDUPTHER

## 2020-07-14 ENCOUNTER — TELEPHONE (OUTPATIENT)
Dept: INTERNAL MEDICINE CLINIC | Facility: CLINIC | Age: 60
End: 2020-07-14

## 2020-07-14 DIAGNOSIS — E11.8 TYPE 2 DIABETES MELLITUS WITH COMPLICATION, WITHOUT LONG-TERM CURRENT USE OF INSULIN (HCC): ICD-10-CM

## 2020-07-14 NOTE — TELEPHONE ENCOUNTER
I called the patient and they verified their name and date of birth. I informed the patient that I needed to know the name of the medication. The patient stated it is the Jardiance and that she called the pharmacy this morning and they had not heard anything. I informed the patient that the PA was approved this morning and the insurance company normally send us paper work, along with the patient and the pharmacy. I informed the patient that I would send a refill request to [facility] to see what she can do.

## 2020-07-14 NOTE — TELEPHONE ENCOUNTER
Prior authorization form submitted via Cover  Meds for jardiance.   Alternatives if Jardiance is not approved are: metFORMIN HCl , glipizide, glipizide ER, glimepiride, glipiZIDE-metFORMIN HCl and  Pioglitazone HCl .

## 2020-07-14 NOTE — TELEPHONE ENCOUNTER
Approved today JARDIANCE TAB 10MG   Request Reference Number: [id number]. JARDIANCE TAB 10MG is approved through 07/14/2021. For further questions, call [phone].

## 2020-07-14 NOTE — TELEPHONE ENCOUNTER
Patient called to say that Temi Juan did not receive the medication refill please advise  [phone]

## 2020-07-21 DIAGNOSIS — I10 ESSENTIAL HYPERTENSION: ICD-10-CM

## 2020-07-21 RX ORDER — LISINOPRIL 10 MG/1
TABLET ORAL
Qty: 90 TAB | Refills: 3 | Status: SHIPPED | OUTPATIENT
Start: 2020-07-21 | End: 2021-01-04 | Stop reason: SDUPTHER

## 2020-11-04 NOTE — PROGRESS NOTES
HPI  Tomasa Cabello is a 62y.o. year old female patient of Lizy Lee NP who presents with c/o HTN, DM, XOL, weight fu. Pt has history of has Type 2 diabetes mellitus with microalbuminuria, without long-term current use of insulin ([address] HTN (hypertension), Hypercholesteremia, and Obesity, morbid ([address] on their problem list.. HTN  Home BP: doesn't check at home.   Medication regimen/ADR/missed doses: carvdeilol 3.125 BID, Lisinopril 10mg, reports compliance  Denies chest pain, chest pressure, or palpitations. Denies SOB, orthopnea, or PND. Denies dizziness, blurred vision, or swelling.         Diabetic ROS   medication compliance/ADR: metformin 1000 BID, glipizide 5mg BID, Jardiance, denies ADR  home glucose monitoring: BS run 12-s  diabetic ROS: Denies lightheadedness, feeling jittery or irritable. Denies numbness/tingling in extremities. Diet: Has been stress eating since COVID, was depressed some, doing better now  Exercise: Plans to start walking more. Weight: up 10lbs since Feb    Had pneumonia shot in ? When first dx as diabetic. Had in [place], [place].        Lab Results   Component Value Date/Time    Hemoglobin A1c 7.1 (H) 2019 03:04 PM    Hemoglobin A1c (POC) 7.0 2020 09:55 AM           Patient Active Problem List   Diagnosis Code    Type 2 diabetes mellitus with microalbuminuria, without long-term current use of insulin (HCC) E11.29, R80.9    HTN (hypertension) I10    Hypercholesteremia E78.00    Obesity, morbid ([address] E66.01     Past Medical History:   Diagnosis Date    Diabetes ([address]     HTN (hypertension)     Hypercholesteremia      Past Surgical History:   Procedure Laterality Date    HX  SECTION       Social History     Socioeconomic History    Marital status:      Spouse name: Not on file    Number of children: Not on file    Years of education: Not on file    Highest education level: Not on file   Tobacco Use    Smoking status: Former Smoker     Packs/day: 1.00     Types: Cigarettes     Last attempt to quit:      Years since quittin.8    Smokeless tobacco: Never Used   Substance and Sexual Activity    Alcohol use: No     Frequency: Never     Comment: [facility] Drug use: No    Sexual activity: Yes     Comment: menopausal   Social History Narrative    Works at [employer].  and lives with spouse. Family History   Problem Relation Age of Onset    Hypertension Mother     Cancer Father         lung    Heart Disease Father     Hypertension Father     Parkinson's Disease Father     Dementia Father     Bipolar Disorder Other         daughter     No Known Allergies    MEDICATIONS  Current Outpatient Medications   Medication Sig    lisinopriL (PRINIVIL, ZESTRIL) 10 mg tablet TAKE ONE TABLET BY MOUTH DAILY    empagliflozin (Jardiance) 10 mg tablet Take 1 Tab by mouth daily.  metFORMIN (GLUCOPHAGE) 1,000 mg tablet TAKE ONE TABLET BY MOUTH TWICE A DAY WITH MEALS    rosuvastatin (CRESTOR) 20 mg tablet Take 1 Tab by mouth nightly. Indications: high cholesterol    glipiZIDE (GLUCOTROL) 5 mg tablet TAKE ONE TABLET BY MOUTH TWICE A DAY    carvediloL (COREG) 3.125 mg tablet TAKE ONE TABLET BY MOUTH TWICE A DAY WITH MEALS    fluticasone propionate (FLONASE ALLERGY RELIEF) 50 mcg/actuation nasal spray Flonase Allergy Relief 50 mcg/actuation nasal spray,suspension   Spray 1 spray every day by intranasal route.  multivitamin (ONE A DAY) tablet Take 1 Tab by mouth daily.  psyllium (METAMUCIL) powd Take  by mouth. No current facility-administered medications for this visit. REVIEW OF SYSTEMS  Per HPI        Visit Vitals  /77 (BP 1 Location: Left arm, BP Patient Position: Sitting)   Pulse (!) 103   Temp 98.9 °F (37.2 °C) (Oral)   Resp 16   Ht 5' 8\" (1.727 m)   Wt 312 lb (141.5 kg)   SpO2 98%   BMI 47.44 kg/m²         General: Well-developed, well-nourished. In no distress.   A&O x 3.  Head: Normocephalic, atraumatic. Eyes: Conjunctiva clear. Pupils equal, round, reactive to light. Extraocular movements intact. Ears/Nose:  Nares normal. Septum midline. Normal nasal mucosa. No drainage or sinus tenderness. Mouth/Throat: Lips, mucosa, and tongue normal. Oropharynx benign. Neck: Supple, symmetrical, trachea midline, no lymphadenopathy, no carotid bruits, no JVD, thyroid: not enlarged, symmetric, no tenderness/mass/nodules. Lungs: Clear to auscultation bilaterally. No crackles or wheezes. No use of accessory muscles. Speaks in full sentences without SOB. Chest Wall: No tenderness or deformity. Heart: RRR, normal S1 and S2, no murmur, click, rub, or gallop. Skin: No rashes or lesions. Neurovasc: No edema appreciated. Dorsalis pedis pulses are 2+ on the right side, and 2+ on the left side. Posterior tibial pulses are 2+ on the right side, and 2+ on the left side. Musculoskeletal: Gait normal.   Psychiatric: Normal mood and affect. Behavior is normal.       No results found for any visits on 11/05/20. ASSESSMENT and PLAN  Diagnoses and all orders for this visit:    1. Essential hypertension  Well controlled, continue current management. 2. Type 2 diabetes mellitus with complication, without long-term current use of insulin (HCC)  -     AMB POC HEMOGLOBIN A1C  A1C well controlled  She is worried about cost of Jardiance when insurance renews- info given for coupon for $0 copay  Encouraged regular exercise     3. Hypercholesteremia  Tolerating statin    4. Obesity, morbid ([facility]  I discussed health problems associated with obesity, including CVD, type 2 DM, NETTIE, CARPENTER, arthritis, increased risk for certain cancers, etc.  Discussed BMI goal is less than 30. I advised a starting weight loss goal of 5-10% of current body weight over the next 3-6 months through diet and lifestyle changes.   I recommended a diet rich in non-starchy vegetables, lean proteins, fruit and whole grains,  and limiting processed food and sweetened beverage intake. I recommended that he/she start incorporating exercise into their daily routine, suggested walking for 20-30 minutes daily and gradually increasing intensity and amount of exercise to a goal of 150 minutes weekly. She wants to defer labs until next year when our lab is open- she does not want to go to external facility. She declines mammogram until next year due to Shawn        Patient Instructions     [phone]. Jardiance          Learning About Diabetes Food Guidelines  Your Care Instructions     Meal planning is important to manage diabetes. It helps keep your blood sugar at a target level (which you set with your doctor). You don't have to eat special foods. You can eat what your family eats, including sweets once in a while. But you do have to pay attention to how often you eat and how much you eat of certain foods. You may want to work with a dietitian or a certified diabetes educator (CDE) to help you plan meals and snacks. A dietitian or CDE can also help you lose weight if that is one of your goals. What should you know about eating carbs? Managing the amount of carbohydrate (carbs) you eat is an important part of healthy meals when you have diabetes. Carbohydrate is found in many foods. · Learn which foods have carbs. And learn the amounts of carbs in different foods. ? Bread, cereal, pasta, and rice have about 15 grams of carbs in a serving. A serving is 1 slice of bread (1 ounce), ½ cup of cooked cereal, or 1/3 cup of cooked pasta or rice. ? Fruits have 15 grams of carbs in a serving. A serving is 1 small fresh fruit, such as an apple or orange; ½ of a banana; ½ cup of cooked or canned fruit; ½ cup of fruit juice; 1 cup of melon or raspberries; or 2 tablespoons of dried fruit. ? Milk and no-sugar-added yogurt have 15 grams of carbs in a serving.  A serving is 1 cup of milk or 2/3 cup of no-sugar-added yogurt. ? Starchy vegetables have 15 grams of carbs in a serving. A serving is ½ cup of mashed potatoes or sweet potato; 1 cup winter squash; ½ of a small baked potato; ½ cup of cooked beans; or ½ cup cooked corn or green peas. · Learn how much carbs to eat each day and at each meal. A dietitian or CDE can teach you how to keep track of the amount of carbs you eat. This is called carbohydrate counting. · If you are not sure how to count carbohydrate grams, use the Plate Method to plan meals. It is a good, quick way to make sure that you have a balanced meal. It also helps you spread carbs throughout the day. ? Divide your plate by types of foods. Put non-starchy vegetables on half the plate, meat or other protein food on one-quarter of the plate, and a grain or starchy vegetable in the final quarter of the plate. To this you can add a small piece of fruit and 1 cup of milk or yogurt, depending on how many carbs you are supposed to eat at a meal.  · Try to eat about the same amount of carbs at each meal. Do not \"save up\" your daily allowance of carbs to eat at one meal.  · Proteins have very little or no carbs per serving. Examples of proteins are beef, chicken, turkey, fish, eggs, tofu, cheese, cottage cheese, and peanut butter. A serving size of meat is 3 ounces, which is about the size of a deck of cards. Examples of meat substitute serving sizes (equal to 1 ounce of meat) are 1/4 cup of cottage cheese, 1 egg, 1 tablespoon of peanut butter, and ½ cup of tofu. How can you eat out and still eat healthy? · Learn to estimate the serving sizes of foods that have carbohydrate. If you measure food at home, it will be easier to estimate the amount in a serving of restaurant food. · If the meal you order has too much carbohydrate (such as potatoes, corn, or baked beans), ask to have a low-carbohydrate food instead. Ask for a salad or green vegetables.   · If you use insulin, check your blood sugar before and after eating out to help you plan how much to eat in the future. · If you eat more carbohydrate at a meal than you had planned, take a walk or do other exercise. This will help lower your blood sugar. What else should you know? · Limit saturated fat, such as the fat from meat and dairy products. This is a healthy choice because people who have diabetes are at higher risk of heart disease. So choose lean cuts of meat and nonfat or low-fat dairy products. Use olive or canola oil instead of butter or shortening when cooking. · Don't skip meals. Your blood sugar may drop too low if you skip meals and take insulin or certain medicines for diabetes. · Check with your doctor before you drink alcohol. Alcohol can cause your blood sugar to drop too low. Alcohol can also cause a bad reaction if you take certain diabetes medicines. Follow-up care is a key part of your treatment and safety. Be sure to make and go to all appointments, and call your doctor if you are having problems. It's also a good idea to know your test results and keep a list of the medicines you take. Where can you learn more? Go to http://www.escalona.com/  Enter I147 in the search box to learn more about \"Learning About Diabetes Food Guidelines. \"  Current as of: December 20, 2019               Content Version: 12.6  © 9447-5548 [employer]. Care instructions adapted under license by [employer] (which disclaims liability or warranty for this information). If you have questions about a medical condition or this instruction, always ask your healthcare professional. [place] [address] any warranty or liability for your use of this information. High Blood Pressure: Care Instructions  Overview     It's normal for blood pressure to go up and down throughout the day. But if it stays up, you have high blood pressure. Another name for high blood pressure is hypertension.   Despite what a lot of people think, high blood pressure usually doesn't cause headaches or make you feel dizzy or lightheaded. It usually has no symptoms. But it does increase your risk of stroke, heart attack, and other problems. You and your doctor will talk about your risks of these problems based on your blood pressure. Your doctor will give you a goal for your blood pressure. Your goal will be based on your health and your age. Lifestyle changes, such as eating healthy and being active, are always important to help lower blood pressure. You might also take medicine to reach your blood pressure goal.  Follow-up care is a key part of your treatment and safety. Be sure to make and go to all appointments, and call your doctor if you are having problems. It's also a good idea to know your test results and keep a list of the medicines you take. How can you care for yourself at home? Medical treatment  · If you stop taking your medicine, your blood pressure will go back up. You may take one or more types of medicine to lower your blood pressure. Be safe with medicines. Take your medicine exactly as prescribed. Call your doctor if you think you are having a problem with your medicine. · Talk to your doctor before you start taking aspirin every day. Aspirin can help certain people lower their risk of a heart attack or stroke. But taking aspirin isn't right for everyone, because it can cause serious bleeding. · See your doctor regularly. You may need to see the doctor more often at first or until your blood pressure comes down. · If you are taking blood pressure medicine, talk to your doctor before you take decongestants or anti-inflammatory medicine, such as ibuprofen. Some of these medicines can raise blood pressure. · Learn how to check your blood pressure at home. Lifestyle changes  · Stay at a healthy weight.  This is especially important if you put on weight around the waist. Losing even 10 pounds can help you lower your blood pressure. · If your doctor recommends it, get more exercise. Walking is a good choice. Bit by bit, increase the amount you walk every day. Try for at least 30 minutes on most days of the week. You also may want to swim, bike, or do other activities. · Avoid or limit alcohol. Talk to your doctor about whether you can drink any alcohol. · Try to limit how much sodium you eat to less than 2,300 milligrams (mg) a day. Your doctor may ask you to try to eat less than 1,500 mg a day. · Eat plenty of fruits (such as bananas and oranges), vegetables, legumes, whole grains, and low-fat dairy products. · Lower the amount of saturated fat in your diet. Saturated fat is found in animal products such as milk, cheese, and meat. Limiting these foods may help you lose weight and also lower your risk for heart disease. · Do not smoke. Smoking increases your risk for heart attack and stroke. If you need help quitting, talk to your doctor about stop-smoking programs and medicines. These can increase your chances of quitting for good. When should you call for help? Call  911 anytime you think you may need emergency care. This may mean having symptoms that suggest that your blood pressure is causing a serious heart or blood vessel problem. Your blood pressure may be over 180/120. For example, call 911 if:    · You have symptoms of a heart attack. These may include:  ? Chest pain or pressure, or a strange feeling in the chest.  ? Sweating. ? Shortness of breath. ? Nausea or vomiting. ? Pain, pressure, or a strange feeling in the back, neck, jaw, or upper belly or in one or both shoulders or arms. ? Lightheadedness or sudden weakness. ? A fast or irregular heartbeat.     · You have symptoms of a stroke. These may include:  ? Sudden numbness, tingling, weakness, or loss of movement in your face, arm, or leg, especially on only one side of your body. ? Sudden vision changes. ? Sudden trouble speaking.   ? Sudden confusion or trouble understanding simple statements. ? Sudden problems with walking or balance. ? A sudden, severe headache that is different from past headaches.     · You have severe back or belly pain. Do not wait until your blood pressure comes down on its own. Get help right away. Call your doctor now or seek immediate care if:    · Your blood pressure is much higher than normal (such as 180/120 or higher), but you don't have symptoms.     · You think high blood pressure is causing symptoms, such as:  ? Severe headache.  ? Blurry vision. Watch closely for changes in your health, and be sure to contact your doctor if:    · Your blood pressure measures higher than your doctor recommends at least 2 times. That means the top number is higher or the bottom number is higher, or both.     · You think you may be having side effects from your blood pressure medicine. Where can you learn more? Go to http://www.gray.com/  Enter [id number] in the search box to learn more about \"High Blood Pressure: Care Instructions. \"  Current as of: December 16, 2019               Content Version: 12.6  © 5922-5906 [employer]. Care instructions adapted under license by [employer] (which disclaims liability or warranty for this information). If you have questions about a medical condition or this instruction, always ask your healthcare professional. [address] any warranty or liability for your use of this information. Please keep your follow-up appointment with Alonso Clements NP.          Health Maintenance Due   Topic Date Due    Pneumococcal 0-64 years (1 of 1 - PPSV23) [birth year]/1967    DTaP/Tdap/Td series (1 - Tdap) 02/09/1982    Shingrix Vaccine Age 50> (1 of 2) 02/09/2011    Breast Cancer Screen Mammogram  02/09/2011    Foot Exam Q1  04/25/2020    MICROALBUMIN Q1  04/26/2020    Flu Vaccine (1) 09/01/2020    Lipid Screen  09/19/2020  Eye Exam Retinal or Dilated  11/09/2020       I have discussed the diagnosis with the patient and the intended plan as seen in the above orders. Patient is in agreement. The patient has received an after-visit summary and questions were answered concerning future plans. I have discussed medication side effects and warnings with the patient as well. Warning signs for the above conditions were discussed including when to call our office or go to the emergency room. The nurse provided the patient and/or family with advanced directive information if needed and encouraged the patient to provide a copy to the office when available.

## 2020-11-05 ENCOUNTER — OFFICE VISIT (OUTPATIENT)
Dept: INTERNAL MEDICINE CLINIC | Age: 60
End: 2020-11-05
Payer: COMMERCIAL

## 2020-11-05 VITALS
WEIGHT: 293 LBS | HEIGHT: 68 IN | RESPIRATION RATE: 16 BRPM | DIASTOLIC BLOOD PRESSURE: 77 MMHG | SYSTOLIC BLOOD PRESSURE: 129 MMHG | BODY MASS INDEX: 44.41 KG/M2 | OXYGEN SATURATION: 98 % | HEART RATE: 103 BPM | TEMPERATURE: 98.9 F

## 2020-11-05 DIAGNOSIS — E78.00 HYPERCHOLESTEREMIA: ICD-10-CM

## 2020-11-05 DIAGNOSIS — I10 ESSENTIAL HYPERTENSION: Primary | ICD-10-CM

## 2020-11-05 DIAGNOSIS — E11.8 TYPE 2 DIABETES MELLITUS WITH COMPLICATION, WITHOUT LONG-TERM CURRENT USE OF INSULIN (HCC): ICD-10-CM

## 2020-11-05 DIAGNOSIS — E66.01 OBESITY, MORBID (HCC): ICD-10-CM

## 2020-11-05 LAB — HBA1C MFR BLD HPLC: 7.1 %

## 2020-11-05 PROCEDURE — 99213 OFFICE O/P EST LOW 20 MIN: CPT | Performed by: NURSE PRACTITIONER

## 2020-11-05 PROCEDURE — 83036 HEMOGLOBIN GLYCOSYLATED A1C: CPT | Performed by: NURSE PRACTITIONER

## 2020-11-05 PROCEDURE — 3051F HG A1C>EQUAL 7.0%<8.0%: CPT | Performed by: NURSE PRACTITIONER

## 2020-11-05 NOTE — PATIENT INSTRUCTIONS
[phone]. [place] Li Learning About Diabetes Food Guidelines Your Care Instructions Meal planning is important to manage diabetes. It helps keep your blood sugar at a target level (which you set with your doctor). You don't have to eat special foods. You can eat what your family eats, including sweets once in a while. But you do have to pay attention to how often you eat and how much you eat of certain foods. You may want to work with a dietitian or a certified diabetes educator (CDE) to help you plan meals and snacks. A dietitian or CDE can also help you lose weight if that is one of your goals. What should you know about eating carbs? Managing the amount of carbohydrate (carbs) you eat is an important part of healthy meals when you have diabetes. Carbohydrate is found in many foods. · Learn which foods have carbs. And learn the amounts of carbs in different foods. ? Bread, cereal, pasta, and rice have about 15 grams of carbs in a serving. A serving is 1 slice of bread (1 ounce), ½ cup of cooked cereal, or 1/3 cup of cooked pasta or rice. ? Fruits have 15 grams of carbs in a serving. A serving is 1 small fresh fruit, such as an apple or orange; ½ of a banana; ½ cup of cooked or canned fruit; ½ cup of fruit juice; 1 cup of melon or raspberries; or 2 tablespoons of dried fruit. ? Milk and no-sugar-added yogurt have 15 grams of carbs in a serving. A serving is 1 cup of milk or 2/3 cup of no-sugar-added yogurt. ? Starchy vegetables have 15 grams of carbs in a serving. A serving is ½ cup of mashed potatoes or sweet potato; 1 cup winter squash; ½ of a small baked potato; ½ cup of cooked beans; or ½ cup cooked corn or green peas. · Learn how much carbs to eat each day and at each meal. A dietitian or CDE can teach you how to keep track of the amount of carbs you eat. This is called carbohydrate counting.  
· If you are not sure how to count carbohydrate grams, use the Plate Method to plan meals. It is a good, quick way to make sure that you have a balanced meal. It also helps you spread carbs throughout the day. ? Divide your plate by types of foods. Put non-starchy vegetables on half the plate, meat or other protein food on one-quarter of the plate, and a grain or starchy vegetable in the final quarter of the plate. To this you can add a small piece of fruit and 1 cup of milk or yogurt, depending on how many carbs you are supposed to eat at a meal. 
· Try to eat about the same amount of carbs at each meal. Do not \"save up\" your daily allowance of carbs to eat at one meal. 
· Proteins have very little or no carbs per serving. Examples of proteins are beef, chicken, turkey, fish, eggs, tofu, cheese, cottage cheese, and peanut butter. A serving size of meat is 3 ounces, which is about the size of a deck of cards. Examples of meat substitute serving sizes (equal to 1 ounce of meat) are 1/4 cup of cottage cheese, 1 egg, 1 tablespoon of peanut butter, and ½ cup of tofu. How can you eat out and still eat healthy? · Learn to estimate the serving sizes of foods that have carbohydrate. If you measure food at home, it will be easier to estimate the amount in a serving of restaurant food. · If the meal you order has too much carbohydrate (such as potatoes, corn, or baked beans), ask to have a low-carbohydrate food instead. Ask for a salad or green vegetables. · If you use insulin, check your blood sugar before and after eating out to help you plan how much to eat in the future. · If you eat more carbohydrate at a meal than you had planned, take a walk or do other exercise. This will help lower your blood sugar. What else should you know? · Limit saturated fat, such as the fat from meat and dairy products. This is a healthy choice because people who have diabetes are at higher risk of heart disease.  So choose lean cuts of meat and nonfat or low-fat dairy products. Use olive or canola oil instead of butter or shortening when cooking. · Don't skip meals. Your blood sugar may drop too low if you skip meals and take insulin or certain medicines for diabetes. · Check with your doctor before you drink alcohol. Alcohol can cause your blood sugar to drop too low. Alcohol can also cause a bad reaction if you take certain diabetes medicines. Follow-up care is a key part of your treatment and safety. Be sure to make and go to all appointments, and call your doctor if you are having problems. It's also a good idea to know your test results and keep a list of the medicines you take. Where can you learn more? Go to http://www.gray.com/ Enter V061 in the search box to learn more about \"Learning About Diabetes Food Guidelines. \" Current as of: December 20, 2019               Content Version: 12.6 © 1701-1410 [employer]. Care instructions adapted under license by [employer] (which disclaims liability or warranty for this information). If you have questions about a medical condition or this instruction, always ask your healthcare professional. [address] any warranty or liability for your use of this information. High Blood Pressure: Care Instructions Overview It's normal for blood pressure to go up and down throughout the day. But if it stays up, you have high blood pressure. Another name for high blood pressure is hypertension. Despite what a lot of people think, high blood pressure usually doesn't cause headaches or make you feel dizzy or lightheaded. It usually has no symptoms. But it does increase your risk of stroke, heart attack, and other problems. You and your doctor will talk about your risks of these problems based on your blood pressure. Your doctor will give you a goal for your blood pressure. Your goal will be based on your health and your age. Lifestyle changes, such as eating healthy and being active, are always important to help lower blood pressure. You might also take medicine to reach your blood pressure goal. 
Follow-up care is a key part of your treatment and safety. Be sure to make and go to all appointments, and call your doctor if you are having problems. It's also a good idea to know your test results and keep a list of the medicines you take. How can you care for yourself at home? Medical treatment · If you stop taking your medicine, your blood pressure will go back up. You may take one or more types of medicine to lower your blood pressure. Be safe with medicines. Take your medicine exactly as prescribed. Call your doctor if you think you are having a problem with your medicine. · Talk to your doctor before you start taking aspirin every day. Aspirin can help certain people lower their risk of a heart attack or stroke. But taking aspirin isn't right for everyone, because it can cause serious bleeding. · See your doctor regularly. You may need to see the doctor more often at first or until your blood pressure comes down. · If you are taking blood pressure medicine, talk to your doctor before you take decongestants or anti-inflammatory medicine, such as ibuprofen. Some of these medicines can raise blood pressure. · Learn how to check your blood pressure at home. Lifestyle changes · Stay at a healthy weight. This is especially important if you put on weight around the waist. Losing even 10 pounds can help you lower your blood pressure. · If your doctor recommends it, get more exercise. Walking is a good choice. Bit by bit, increase the amount you walk every day. Try for at least 30 minutes on most days of the week. You also may want to swim, bike, or do other activities. · Avoid or limit alcohol. Talk to your doctor about whether you can drink any alcohol.  
· Try to limit how much sodium you eat to less than 2,300 milligrams (mg) a day. Your doctor may ask you to try to eat less than 1,500 mg a day. · Eat plenty of fruits (such as bananas and oranges), vegetables, legumes, whole grains, and low-fat dairy products. · Lower the amount of saturated fat in your diet. Saturated fat is found in animal products such as milk, cheese, and meat. Limiting these foods may help you lose weight and also lower your risk for heart disease. · Do not smoke. Smoking increases your risk for heart attack and stroke. If you need help quitting, talk to your doctor about stop-smoking programs and medicines. These can increase your chances of quitting for good. When should you call for help? Call  911 anytime you think you may need emergency care. This may mean having symptoms that suggest that your blood pressure is causing a serious heart or blood vessel problem. Your blood pressure may be over 180/120. For example, call 911 if: 
  · You have symptoms of a heart attack. These may include: 
? Chest pain or pressure, or a strange feeling in the chest. 
? Sweating. ? Shortness of breath. ? Nausea or vomiting. ? Pain, pressure, or a strange feeling in the back, neck, jaw, or upper belly or in one or both shoulders or arms. ? Lightheadedness or sudden weakness. ? A fast or irregular heartbeat.  
  · You have symptoms of a stroke. These may include: 
? Sudden numbness, tingling, weakness, or loss of movement in your face, arm, or leg, especially on only one side of your body. ? Sudden vision changes. ? Sudden trouble speaking. ? Sudden confusion or trouble understanding simple statements. ? Sudden problems with walking or balance. ? A sudden, severe headache that is different from past headaches.  
  · You have severe back or belly pain. Do not wait until your blood pressure comes down on its own. Get help right away.  
Call your doctor now or seek immediate care if: 
  · Your blood pressure is much higher than normal (such as 180/120 or higher), but you don't have symptoms.  
  · You think high blood pressure is causing symptoms, such as: 
? Severe headache. 
? Blurry vision. Watch closely for changes in your health, and be sure to contact your doctor if: 
  · Your blood pressure measures higher than your doctor recommends at least 2 times. That means the top number is higher or the bottom number is higher, or both.  
  · You think you may be having side effects from your blood pressure medicine. Where can you learn more? Go to http://www.gray.com/ Enter B416 in the search box to learn more about \"High Blood Pressure: Care Instructions. \" Current as of: December 16, 2019               Content Version: 12.6 © 2051-8549 [employer]. Care instructions adapted under license by [employer] (which disclaims liability or warranty for this information). If you have questions about a medical condition or this instruction, always ask your healthcare professional. [address] any warranty or liability for your use of this information.

## 2020-11-05 NOTE — PROGRESS NOTES
Estrella Henley  Identified pt with two pt identifiers(name and ). Chief Complaint   Patient presents with    Diabetes     Room 4A //    Hypertension    Cholesterol Problem    Weight Management       Reviewed record In preparation for visit and have obtained necessary documentation. 1. Have you been to the ER, urgent care clinic or hospitalized since your last visit? No     2. Have you seen or consulted any other health care providers outside of the [address] [place] [address] since your last visit? Include any pap smears or colon screening. No    Patient does not have an advance directive. Vitals reviewed with provider.     Health Maintenance reviewed:     Health Maintenance Due   Topic    Pneumococcal 0-64 years (1 of 1 - PPSV23)    DTaP/Tdap/Td series (1 - Tdap)    Shingrix Vaccine Age 49> (1 of 2)    Breast Cancer Screen Mammogram     Foot Exam Q1     MICROALBUMIN Q1     Lipid Screen     Eye Exam Retinal or Dilated           Wt Readings from Last 3 Encounters:   20 312 lb (141.5 kg)   20 302 lb 6.4 oz (137.2 kg)   19 305 lb (138.3 kg)        Temp Readings from Last 3 Encounters:   20 98.9 °F (37.2 °C) (Oral)   20 98.6 °F (37 °C) (Oral)   19 98 °F (36.7 °C) (Oral)        BP Readings from Last 3 Encounters:   20 129/77   20 130/80   19 125/74        Pulse Readings from Last 3 Encounters:   20 (!) 103   20 (!) 101   19 89        Vitals:    20 1321   BP: 129/77   Pulse: (!) 103   Resp: 16   Temp: 98.9 °F (37.2 °C)   TempSrc: Oral   SpO2: 98%   Weight: 312 lb (141.5 kg)   Height: 5' 8\" (1.727 m)   PainSc:   0 - No pain          Learning Assessment:   :       Learning Assessment 2019   PRIMARY LEARNER Patient   BARRIERS PRIMARY LEARNER NONE   CO-LEARNER CAREGIVER No   PRIMARY LANGUAGE ENGLISH   LEARNER PREFERENCE PRIMARY READING   ANSWERED BY self   RELATIONSHIP SELF        Depression Screening:   :       3 most recent PHQ Screens 2/18/2020   Little interest or pleasure in doing things Not at all   Feeling down, depressed, irritable, or hopeless Not at all   Total Score PHQ 2 0        Fall Risk Assessment:   :     No flowsheet data found. Abuse Screening:   :     No flowsheet data found.      ADL Screening:   :       ADL Assessment 11/14/2019   Feeding yourself No Help Needed   Getting from bed to chair No Help Needed   Getting dressed No Help Needed   Bathing or showering No Help Needed   Walk across the room (includes cane/walker) No Help Needed   Using the telphone No Help Needed   Taking your medications No Help Needed   Preparing meals No Help Needed   Managing money (expenses/bills) No Help Needed   Moderately strenuous housework (laundry) No Help Needed   Shopping for personal items (toiletries/medicines) No Help Needed   Shopping for groceries No Help Needed   Driving No Help Needed   Climbing a flight of stairs No Help Needed   Getting to places beyond walking distances No Help Needed

## 2020-11-08 DIAGNOSIS — E11.8 TYPE 2 DIABETES MELLITUS WITH COMPLICATION, WITHOUT LONG-TERM CURRENT USE OF INSULIN (HCC): ICD-10-CM

## 2020-11-08 RX ORDER — EMPAGLIFLOZIN 10 MG/1
TABLET, FILM COATED ORAL
Qty: 30 TAB | Refills: 2 | Status: SHIPPED | OUTPATIENT
Start: 2020-11-08 | End: 2021-08-03

## 2021-01-04 DIAGNOSIS — E11.8 TYPE 2 DIABETES MELLITUS WITH COMPLICATION, WITHOUT LONG-TERM CURRENT USE OF INSULIN (HCC): ICD-10-CM

## 2021-01-04 DIAGNOSIS — I10 ESSENTIAL HYPERTENSION: ICD-10-CM

## 2021-01-04 RX ORDER — METFORMIN HYDROCHLORIDE 1000 MG/1
1000 TABLET ORAL 2 TIMES DAILY WITH MEALS
Qty: 180 TAB | Refills: 1 | Status: SHIPPED | OUTPATIENT
Start: 2021-01-04 | End: 2021-07-04

## 2021-01-04 RX ORDER — LISINOPRIL 10 MG/1
10 TABLET ORAL DAILY
Qty: 90 TAB | Refills: 3 | Status: SHIPPED | OUTPATIENT
Start: 2021-01-04 | End: 2022-01-02 | Stop reason: SDUPTHER

## 2021-01-14 DIAGNOSIS — E11.8 TYPE 2 DIABETES MELLITUS WITH COMPLICATION, WITHOUT LONG-TERM CURRENT USE OF INSULIN (HCC): ICD-10-CM

## 2021-01-14 NOTE — TELEPHONE ENCOUNTER
PCP: Nura Alfaro NP     Last appt: 11/5/2020   Future Appointments   Date Time Provider [place] Comer   2/15/2021 10:30 AM Nura Alfaro NP [facility] AMB        Requested Prescriptions     Pending Prescriptions Disp Refills    glipiZIDE (GLUCOTROL) 5 mg tablet 180 Tab 4     Sig: Take 1 Tab by mouth two (2) times a day.

## 2021-01-15 RX ORDER — GLIPIZIDE 5 MG/1
5 TABLET ORAL 2 TIMES DAILY
Qty: 180 TAB | Refills: 4 | Status: SHIPPED | OUTPATIENT
Start: 2021-01-15 | End: 2022-04-03

## 2021-05-07 DIAGNOSIS — I10 ESSENTIAL HYPERTENSION: ICD-10-CM

## 2021-05-07 RX ORDER — CARVEDILOL 3.12 MG/1
TABLET ORAL
Qty: 180 TAB | Refills: 2 | Status: SHIPPED | OUTPATIENT
Start: 2021-05-07 | End: 2022-02-13

## 2021-07-04 DIAGNOSIS — E11.8 TYPE 2 DIABETES MELLITUS WITH COMPLICATION, WITHOUT LONG-TERM CURRENT USE OF INSULIN (HCC): ICD-10-CM

## 2021-07-04 RX ORDER — METFORMIN HYDROCHLORIDE 1000 MG/1
TABLET ORAL
Qty: 180 TABLET | Refills: 1 | Status: SHIPPED | OUTPATIENT
Start: 2021-07-04 | End: 2022-01-02 | Stop reason: SDUPTHER

## 2021-08-02 NOTE — PROGRESS NOTES
GISELA Eli is a 62y.o. year old female patient of Rocio Cooper NP who presents with c/o   Chief Complaint   Patient presents with    Annual Wellness Visit     Room 1A //       Pt has history of has Type 2 diabetes mellitus with microalbuminuria, without long-term current use of insulin ([address] HTN (hypertension), Hypercholesteremia, and Obesity, morbid ([address] on their problem list..    Pt here for annual visit for her diabetes and hypertension. Also c/o back pain and diarrhea. Back pain is low back both sides x 1 week and a half. Taking aleve OTC which helps some. Has hx of sciatica many years off. No pain going down legs, no numbness or tingling in legs. Thinks twisted wrong doing water aerobics. HTN  Home BP: normal at home  Stressed bc her bipolar daughter is having a hard time right now  Medication regimen/ADR/missed doses: carvdeilol 3.125 BID, Lisinopril 10mg, reports compliance  Denies chest pain, chest pressure, or palpitations. Denies SOB, orthopnea, or PND. Denies dizziness or blurred vision. Has occasional leg swelling in left ankle.        Diabetic ROS   medication compliance/ADR: metformin 1000 BID, glipizide 5mg BID, Jardiance-not taking anymore bc $600, stopped taking her medicine for awhile bc was having frequent diarrhea  home glucose monitorin range  diabetic ROS: Denies lightheadedness, feeling jittery or irritable. Denies numbness/tingling in extremities. Diet: Hasn't been great. Under a lot of stress at work with covid. No sodas, juice or sweet teas. Cut out fast foods recently. Has some fried foods. Snacking is her weakness.    Exercise: started water aerobics a few weeks ago  Weight: up 20lbs since last visit  Eye MD: hasn't seen since Shawn, follows with Dr. Patricia Montalvo Maintenance Due   Topic Date Due    DTaP/Tdap/Td series (1 - Tdap) Never done    Shingrix Vaccine Age 50> (1 of 2) Never done    Breast Cancer Screen Mammogram  Never done    MICROALBUMIN Q1  2020    Lipid Screen  2020     Lab Results   Component Value Date/Time    Hemoglobin A1c 7.1 (H) 2019 03:04 PM    Hemoglobin A1c (POC) 7.7 2021 08:00 AM     Lab Results   Component Value Date/Time    Sodium 141 2019 03:04 PM    Potassium 4.6 2019 03:04 PM    Chloride 101 2019 03:04 PM    CO2 23 2019 03:04 PM    Glucose 133 (H) 2019 03:04 PM    BUN 18 2019 03:04 PM    Creatinine 0.89 2019 03:04 PM    BUN/Creatinine ratio 20 2019 03:04 PM    GFR est AA 83 2019 03:04 PM    GFR est non-AA 72 2019 03:04 PM    Calcium 9.8 2019 03:04 PM    Bilirubin, total 0.3 2019 03:04 PM    Alk.  phosphatase 70 2019 03:04 PM    Protein, total 7.1 2019 03:04 PM    Albumin 4.5 2019 03:04 PM    A-G Ratio 1.7 2019 03:04 PM    ALT (SGPT) 7 2019 03:04 PM    AST (SGOT) 10 2019 03:04 PM     Lab Results   Component Value Date/Time    Cholesterol, total 214 (H) 2019 03:04 PM    HDL Cholesterol 33 (L) 2019 03:04 PM    LDL,Direct 122 (H) 2019 03:04 PM    LDL, calculated 120 (H) 2019 03:04 PM    VLDL, calculated 61 (H) 2019 03:04 PM    Triglyceride 307 (H) 2019 03:04 PM         Depression Screen  3 most recent PHQ Screens 8/3/2021   Little interest or pleasure in doing things Not at all   Feeling down, depressed, irritable, or hopeless Not at all   Total Score PHQ 2 0           Patient Active Problem List   Diagnosis Code    Type 2 diabetes mellitus with microalbuminuria, without long-term current use of insulin (HCC) E11.29, R80.9    HTN (hypertension) I10    Hypercholesteremia E78.00    Obesity, morbid ([address] E66.01     Past Medical History:   Diagnosis Date    Diabetes ([facility]     HTN (hypertension)     Hypercholesteremia      Past Surgical History:   Procedure Laterality Date    HX  SECTION       Social History Socioeconomic History    Marital status:      Spouse name: Not on file    Number of children: Not on file    Years of education: Not on file    Highest education level: Not on file   Tobacco Use    Smoking status: Former Smoker     Packs/day: 1.00     Types: Cigarettes     Quit date:      Years since quittin.6    Smokeless tobacco: Never Used   Substance and Sexual Activity    Alcohol use: No     Comment: quit in [facility] Drug use: No    Sexual activity: Yes     Comment: menopausal   Social History Narrative    Works at [employer].  and lives with spouse. Social Determinants of Health     Financial Resource Strain:     Difficulty of Paying Living Expenses:    Food Insecurity:     Worried About Running Out of Food in the Last Year:     [address] in the Last Year:    Transportation Needs:     Lack of Transportation (Medical):      Lack of Transportation (Non-Medical):    Physical Activity:     Days of Exercise per Week:     Minutes of Exercise per Session:    Stress:     Feeling of Stress :    Social Connections:     Frequency of Communication with Friends and Family:     Frequency of Social Gatherings with Friends and Family:     Attends Jew Services:     Active Member of Clubs or Organizations:     Attends Club or Organization Meetings:     Marital Status:      Family History   Problem Relation Age of Onset    Hypertension Mother     Cancer Father         lung    Heart Disease Father     Hypertension Father     Parkinson's Disease Father     Dementia Father     Bipolar Disorder Other         daughter     No Known Allergies    MEDICATIONS  Current Outpatient Medications   Medication Sig    metFORMIN (GLUCOPHAGE) 1,000 mg tablet TAKE ONE TABLET BY MOUTH TWICE A DAY WITH MEALS    carvediloL (COREG) 3.125 mg tablet TAKE ONE TABLET BY MOUTH TWICE A DAY WITH MEAL(S)    glipiZIDE (GLUCOTROL) 5 mg tablet Take 1 Tab by mouth two (2) times a day.  lisinopriL (PRINIVIL, ZESTRIL) 10 mg tablet Take 1 Tab by mouth daily.  Jardiance 10 mg tablet TAKE ONE TABLET BY MOUTH ONE TIME DAILY    rosuvastatin (CRESTOR) 20 mg tablet Take 1 Tab by mouth nightly. Indications: high cholesterol    multivitamin (ONE A DAY) tablet Take 1 Tab by mouth daily.  psyllium (METAMUCIL) powd Take  by mouth. No current facility-administered medications for this visit. REVIEW OF SYSTEMS  Per HPI        Visit Vitals  BP (!) 140/90   Pulse 88   Temp 98.1 °F (36.7 °C) (Oral)   Resp 16   Ht 5' 8\" (1.727 m)   Wt 332 lb (150.6 kg)   SpO2 98%   BMI 50.48 kg/m²         General: Well-developed, well-nourished. In no distress. A&O x 3. Head: Normocephalic, atraumatic. Eyes: Conjunctiva clear. Mouth/Throat: Lips, mucosa, and tongue normal. Oropharynx benign. Neck: Supple, symmetrical, trachea midline, no lymphadenopathy, no carotid bruits, no JVD, thyroid: not enlarged, symmetric, no tenderness/mass/nodules. Lungs: Clear to auscultation bilaterally. No crackles or wheezes. No use of accessory muscles. Speaks in full sentences without SOB. Chest Wall: No tenderness or deformity. Heart: RRR, normal S1 and S2, no murmur, click, rub, or gallop. Skin: No rashes or lesions. Neurovasc: No edema appreciated. Dorsalis pedis pulses are 2+ on the right side, and 2+ on the left side. Posterior tibial pulses are 2+ on the right side, and 2+ on the left side. Musculoskeletal: Gait normal.  Psychiatric: Normal mood and affect.  Behavior is normal.   Diabetic foot exam:     Left Foot:   Visual Exam: normal    Pulse DP: 2+ (normal)   Filament test: normal sensation    Vibratory sensation: Vibratory sensation: normal       Right Foot:   Visual Exam: normal    Pulse DP: 2+ (normal)   Filament test: normal sensation    Vibratory sensation: Vibratory sensation: normal              Results for orders placed or performed in visit on 08/03/21   [facility] HEMOGLOBIN A1C   Result Value Ref Range    Hemoglobin A1c (POC) 7.7 %       ASSESSMENT and PLAN  Diagnoses and all orders for this visit:    1. Type 2 diabetes mellitus with complication, without long-term current use of insulin ([facility])  -     METABOLIC PANEL, COMPREHENSIVE; Future  -      DIABETES FOOT EXAM  -     AMB POC HEMOGLOBIN A1C  -     MICROALBUMIN, UR, RAND W/ MICROALB/CREAT RATIO; Future  -     semaglutide (RYBELSUS) 3 mg tablet; Take 1 Tablet by mouth Daily (before breakfast). - START NEW MED  A1C above goal, she is no longer able to get jardiance for $0 copay, will try semaglutide as above and give patient savings card    2. Obesity, morbid ([address]  20lbs weight gain  She is starting water aerobics and working on diet changes  I discussed health problems associated with obesity, including CVD, type 2 DM, NETTIE, CARPENTER, arthritis, increased risk for certain cancers, etc.  Discussed BMI goal is less than 30. I advised a starting weight loss goal of 5-10% of current body weight over the next 3-6 months through diet and lifestyle changes. I recommended a diet rich in non-starchy vegetables, lean proteins, fruit and whole grains,  and limiting processed food and sweetened beverage intake. I recommended that he/she start incorporating exercise into their daily routine, suggested walking for 20-30 minutes daily and gradually increasing intensity and amount of exercise to a goal of 150 minutes weekly. 3. Essential hypertension  -     METABOLIC PANEL, COMPREHENSIVE; Future  Above goal however pt is stressed today and bp at home normal, continue current meds    4. Hypercholesteremia  -     LIPID PANEL; Future    5. Acute bilateral low back pain without sciatica  -     diclofenac EC (VOLTAREN) 75 mg EC tablet; Take 1 Tablet by mouth two (2) times a day. START NEW MED- discussed temporary, if pain persists recommend PT as next step    6.  Mammogram declined  She declines due to covid          Patient Instructions Learning About Carbohydrate (Carb) Counting and Eating Out When You Have Diabetes  Why plan your meals? Meal planning can be a key part of managing diabetes. Planning meals and snacks with the right balance of carbohydrate, protein, and fat can help you keep your blood sugar at the target level you set with your doctor. You don't have to eat special foods. You can eat what your family eats, including sweets once in a while. But you do have to pay attention to how often you eat and how much you eat of certain foods. You may want to work with a dietitian or a certified diabetes educator. He or she can give you tips and meal ideas and can answer your questions about meal planning. This health professional can also help you reach a healthy weight if that is one of your goals. What should you know about eating carbs? Managing the amount of carbohydrate (carbs) you eat is an important part of healthy meals when you have diabetes. Carbohydrate is found in many foods. · Learn which foods have carbs. And learn the amounts of carbs in different foods. ? Bread, cereal, pasta, and rice have about 15 grams of carbs in a serving. A serving is 1 slice of bread (1 ounce), ½ cup of cooked cereal, or 1/3 cup of cooked pasta or rice. ? Fruits have 15 grams of carbs in a serving. A serving is 1 small fresh fruit, such as an apple or orange; ½ of a banana; ½ cup of cooked or canned fruit; ½ cup of fruit juice; 1 cup of melon or raspberries; or 2 tablespoons of dried fruit. ? Milk and no-sugar-added yogurt have 15 grams of carbs in a serving. A serving is 1 cup of milk or 2/3 cup of no-sugar-added yogurt. ? Starchy vegetables have 15 grams of carbs in a serving. A serving is ½ cup of mashed potatoes or sweet potato; 1 cup winter squash; ½ of a small baked potato; ½ cup of cooked beans; or ½ cup cooked corn or green peas.   · Learn how much carbs to eat each day and at each meal. A dietitian or CDE can teach you how to keep track of the amount of carbs you eat. This is called carbohydrate counting. · If you are not sure how to count carbohydrate grams, use the Plate Method to plan meals. It is a good, quick way to make sure that you have a balanced meal. It also helps you spread carbs throughout the day. ? Divide your plate by types of foods. Put non-starchy vegetables on half the plate, meat or other protein food on one-quarter of the plate, and a grain or starchy vegetable in the final quarter of the plate. To this you can add a small piece of fruit and 1 cup of milk or yogurt, depending on how many carbs you are supposed to eat at a meal.  · Try to eat about the same amount of carbs at each meal. Do not \"save up\" your daily allowance of carbs to eat at one meal.  · Proteins have very little or no carbs per serving. Examples of proteins are beef, chicken, turkey, fish, eggs, tofu, cheese, cottage cheese, and peanut butter. A serving size of meat is 3 ounces, which is about the size of a deck of cards. Examples of meat substitute serving sizes (equal to 1 ounce of meat) are 1/4 cup of cottage cheese, 1 egg, 1 tablespoon of peanut butter, and ½ cup of tofu. How can you eat out and still eat healthy? · Learn to estimate the serving sizes of foods that have carbohydrate. If you measure food at home, it will be easier to estimate the amount in a serving of restaurant food. · If the meal you order has too much carbohydrate (such as potatoes, corn, or baked beans), ask to have a low-carbohydrate food instead. Ask for a salad or green vegetables. · If you use insulin, check your blood sugar before and after eating out to help you plan how much to eat in the future. · If you eat more carbohydrate at a meal than you had planned, take a walk or do other exercise. This will help lower your blood sugar. What are some tips for eating healthy? · Limit saturated fat, such as the fat from meat and dairy products.  This is a healthy choice because people who have diabetes are at higher risk of heart disease. So choose lean cuts of meat and nonfat or low-fat dairy products. Use olive or canola oil instead of butter or shortening when cooking. · Don't skip meals. Your blood sugar may drop too low if you skip meals and take insulin or certain medicines for diabetes. · Check with your doctor before you drink alcohol. Alcohol can cause your blood sugar to drop too low. Alcohol can also cause a bad reaction if you take certain diabetes medicines. Follow-up care is a key part of your treatment and safety. Be sure to make and go to all appointments, and call your doctor if you are having problems. It's also a good idea to know your test results and keep a list of the medicines you take. Where can you learn more? Go to http://www.escalona.com/  Enter I147 in the search box to learn more about \"Learning About Carbohydrate (Carb) Counting and Eating Out When You Have Diabetes. \"  Current as of: August 31, 2020               Content Version: 12.8  © 2006-2021 [employer]. Care instructions adapted under license by [employer] (which disclaims liability or warranty for this information). If you have questions about a medical condition or this instruction, always ask your healthcare professional. [address] any warranty or liability for your use of this information. Starting a Weight Loss Plan: Care Instructions  Overview     If you're thinking about losing weight, it can be hard to know where to start. Your doctor can help you set up a weight loss plan that best meets your needs. You may want to take a class on nutrition or exercise, or you could join a weight loss support group.  If you have questions about how to make changes to your eating or exercise habits, ask your doctor about seeing a registered dietitian or an exercise specialist.  It can be a big challenge to lose weight. But you don't have to make huge changes at once. Make small changes, and stick with them. When those changes become habit, add a few more changes. If you don't think you're ready to make changes right now, try to pick a date in the future. Make an appointment to see your doctor to discuss whether the time is right for you to start a plan. Follow-up care is a key part of your treatment and safety. Be sure to make and go to all appointments, and call your doctor if you are having problems. It's also a good idea to know your test results and keep a list of the medicines you take. How can you care for yourself at home? · Set realistic goals. Many people expect to lose much more weight than is likely. A weight loss of 5% to 10% of your body weight may be enough to improve your health. · Get family and friends involved to provide support. Talk to them about why you are trying to lose weight, and ask them to help. They can help by participating in exercise and having meals with you, even if they may be eating something different. · Find what works best for you. If you do not have time or do not like to cook, a program that offers meal replacement bars or shakes may be better for you. Or if you like to prepare meals, finding a plan that includes daily menus and recipes may be best.  · Ask your doctor about other health professionals who can help you achieve your weight loss goals. ? A dietitian can help you make healthy changes in your diet. ? An exercise specialist or  can help you develop a safe and effective exercise program.  ? A counselor or psychiatrist can help you cope with issues such as depression, anxiety, or family problems that can make it hard to focus on weight loss. · Consider joining a support group for people who are trying to lose weight. Your doctor can suggest groups in your area. Where can you learn more?   Go to http://www.gray.com/  Enter U357 in the search box to learn more about \"Starting a Weight Loss Plan: Care Instructions. \"  Current as of: September 23, 2020               Content Version: 12.8  © 2006-2021 [employer]. Care instructions adapted under license by [employer] (which disclaims liability or warranty for this information). If you have questions about a medical condition or this instruction, always ask your healthcare professional. [place] [address] any warranty or liability for your use of this information. Learning About Obesity  What is obesity? Obesity means having an unhealthy amount of body fat. This puts your health in danger. It can lead to other health problems, such as type 2 diabetes and high blood pressure. How do you know if your weight is in the obesity range? To know if your weight is in the obesity range, your doctor looks at your body mass index (BMI) and waist size. BMI is a number that is calculated from your weight and your height. To figure out your BMI for yourself, you can use an online tool, such as http://www.wilkerson.com/ on the [employer] of [employer]. If your BMI is 30.0 or higher, it falls within the obesity range. Keep in mind that BMI and waist size are only guides. They are not tools to determine your ideal body weight. What causes obesity? When you take in more calories than you burn off, you gain weight. How you eat, how active you are, and other things affect how your body uses calories and whether you gain weight. If you have family members who have too much body fat, you may have inherited a tendency to gain weight. And your family also helps form your eating and lifestyle habits, which can lead to obesity. Also, our busy lives make it harder to plan and cook healthy meals. For many of us, it's easier to reach for prepared foods, go out to eat, or go to the drive-through.  But these foods are often high in saturated fat and calories. Portions are often too large. What can you do to reach a healthy weight? Focus on health, not diets. Diets are hard to stay on and don't work in the long run. It is very hard to stay with a diet that includes lots of big changes in your eating habits. Instead of a diet, focus on lifestyle changes that will improve your health and achieve the right balance of energy and calories. To lose weight, you need to burn more calories than you take in. You can do it by eating healthy foods in reasonable amounts and becoming more active, even a little bit every day. Making small changes over time can add up to a lot. Make a plan for change. Many people have found that naming their reasons for change and staying focused on their plan can make a big difference. Work with your doctor to create a plan that is right for you. · Ask yourself: Dominique Worley are my personal, most powerful reasons for wanting this change? What will my life look like when I've made the change? \"  · Set your long-term goal. Make it specific, such as \"I will lose x pounds. \"  · Break your long-term goal into smaller, short-term goals. Make these small steps specific and within your reach, things you know you can do. These steps are what keep you going from day to day. Talk with your doctor about other weight-loss options. If you have a BMI in a certain range and have not been able to lose weight with diet and exercise, medicine or surgery may be an option for you. Before your doctor will prescribe medicines or surgery, he or she will probably want you to be more active and follow your healthy eating plan for a period of time. These habits are key lifelong changes for managing your weight, with or without other medical treatment. And these changes can help you avoid weight-related health problems. How can you stay on your plan for change? Be ready.  Choose to start during a time when there are few events like holidays, social events, and high-stress periods. These events might trigger slip-ups. Decide on your first few steps. Most people have more success when they make small changes, one step at a time. For example, you might switch a daily candy bar to a piece of fruit, walk 10 minutes more, or add more vegetables to a meal.  Line up your support people. Make sure you're not going to be alone as you make this change. Connect with people who understand how important it is to you. Ask family members and friends for help in keeping with your plan. And think about who could make it harder for you, and how to handle them. Try tracking. People who keep track of what they eat, feel, and do are better at losing weight. Try writing down things like:  · What and how much you eat. · How you feel before and after each meal.  · Details about each meal (like eating out or at home, eating alone, or with friends or family). · What you do to be active. Look and plan. As you track, look for patterns that you may want to change. Take note of:  · When you eat and whether you skip meals. · How often you eat out. · How many fruits and vegetables you eat. · When you eat beyond feeling full. · When and why you eat for reasons other than being hungry. When you stray from your plan, don't get upset. Figure out what made you slip up and how you can fix it. Can you take medicines or have surgery to lose weight? If you have a BMI in a certain range and have not been able to lose weight with diet and exercise, medicine or surgery may be an option for you. If you have a BMI of at least 30.0 (or a BMI of at least 27.0 and another health problem related to your weight), ask your doctor about weight-loss medicines. They work by making you feel less hungry, making you feel full more quickly, or changing how you digest fat. Medicines are used along with diet changes and more physical activity to help you make lasting changes.   If you have a BMI of 40.0 or more (or a BMI of 35.0 or more and another health problem related to your weight), your doctor may talk with you about surgery. Weight-loss surgery has risks, and you will need to work with your doctor to compare the risk of having obesity with the risks of surgery. With any option you choose, you will still need to eat a healthy diet and get regular exercise. Follow-up care is a key part of your treatment and safety. Be sure to make and go to all appointments, and call your doctor if you are having problems. It's also a good idea to know your test results and keep a list of the medicines you take. Where can you learn more? Go to http://www.gray.com/  Enter N111 in the search box to learn more about \"Learning About Obesity. \"  Current as of: September 23, 2020               Content Version: 12.8  © 2006-2021 [employer]. Care instructions adapted under license by [employer] (which disclaims liability or warranty for this information). If you have questions about a medical condition or this instruction, always ask your healthcare professional. [address] any warranty or liability for your use of this information. Nutrition Tips for Diabetes: After Your Visit  Your Care Instructions  A healthy diet is important to manage diabetes. It helps you lose weight (if you need to) and keep it off. It gives you the nutrition and energy your body needs and helps prevent heart disease. But a diet for diabetes does not mean that you have to eat special foods. You can eat what your family eats, including occasional sweets and other favorites. But you do have to pay attention to how often you eat and how much you eat of certain foods. The right plan for you will give you meals that help you keep your blood sugar at healthy levels. Try to eat a variety of foods and to spread carbohydrate throughout the day.  Carbohydrate raises blood sugar higher and more quickly than any other nutrient does. Carbohydrate is found in sugar, breads and cereals, fruit, starchy vegetables such as potatoes and corn, and milk and yogurt. You may want to work with a dietitian or diabetes educator to help you plan meals and snacks. A dietitian or diabetes educator also can help you lose weight if that is one of your goals. The following tips can help you enjoy your meals and stay healthy. Follow-up care is a key part of your treatment and safety. Be sure to make and go to all appointments, and call your doctor if you are having problems. Its also a good idea to know your test results and keep a list of the medicines you take. How can you care for yourself at home? · Learn which foods have carbohydrate and how much carbohydrate to eat. A dietitian or diabetes educator can help you learn to keep track of how much carbohydrate you eat. · Spread carbohydrate throughout the day. Eat some carbohydrate at all meals, but do not eat too much at any one time. · Plan meals to include food from all the food groups. These are the food groups and some example portion sizes:  ¨ Grains: 1 slice of bread (1 ounce), ½ cup of cooked cereal, and 1/3 cup of cooked pasta or rice. These have about 15 grams of carbohydrate in a serving. Choose whole grains such as whole wheat bread or crackers, oatmeal, and brown rice more often than refined grains. ¨ Fruit: 1 small fresh fruit, such as an apple or orange; ½ of a banana; ½ cup of chopped, cooked, or canned fruit; ½ cup of fruit juice; 1 cup of melon or raspberries; and 2 tablespoons of dried fruit. These have about 15 grams of carbohydrate in a serving. ¨ Dairy: 1 cup of nonfat or low-fat milk and 2/3 cup of plain yogurt. These have about 15 grams of carbohydrate in a serving. ¨ Protein foods: Beef, chicken, turkey, fish, eggs, tofu, cheese, cottage cheese, and peanut butter.  A serving size of meat is 3 ounces, which is about the size of a deck of cards. Examples of meat substitute serving sizes (equal to 1 ounce of meat) are 1/4 cup of cottage cheese, 1 egg, 1 tablespoon of peanut butter, and ½ cup of tofu. These have very little or no carbohydrate per serving. ¨ Vegetables: Starchy vegetables such as ½ cup of cooked dried beans, peas, potatoes, or corn have about 15 grams of carbohydrate. Nonstarchy vegetables have very little carbohydrate, such as 1 cup of raw leafy vegetables (such as spinach), ½ cup of other vegetables (cooked or chopped), and 3/4 cup of vegetable juice. · Use the plate format to plan meals. It is a good, quick way to make sure that you have a balanced meal. It also helps you spread carbohydrate throughout the day. You divide your plate by types of foods. Put vegetables on half the plate, meat or meat substitutes on one-quarter of the plate, and a grain or starchy vegetable (such as brown rice or a potato) in the final quarter of the plate. To this you can add a small piece of fruit and 1 cup of milk or yogurt, depending on how much carbohydrate you are supposed to eat at a meal.  · Talk to your dietitian or diabetes educator about ways to add limited amounts of sweets into your meal plan. You can eat these foods now and then, as long as you include the amount of carbohydrate they have in your daily carbohydrate allowance. · If you drink alcohol, limit it to no more than 1 drink a day for women and 2 drinks a day for men. If you are pregnant, no amount of alcohol is known to be safe. · Protein, fat, and fiber do not raise blood sugar as much as carbohydrate does. If you eat a lot of these nutrients in a meal, your blood sugar will rise more slowly than it would otherwise. · Limit saturated fats, such as those from meat and dairy products. Try to replace it with monounsaturated fat, such as olive oil. This is a healthier choice because people who have diabetes are at higher-than-average risk of heart disease.  But use a modest amount of olive oil. A tablespoon of olive oil has 14 grams of fat and 120 calories. · Exercise lowers blood sugar. If you take insulin by shots or pump, you can use less than you would if you were not exercising. Keep in mind that timing matters. If you exercise within 1 hour after a meal, your body may need less insulin for that meal than it would if you exercised 3 hours after the meal. Test your blood sugar to find out how exercise affects your need for insulin. · Exercise on most days of the week. Aim for at least 30 minutes. Exercise helps you stay at a healthy weight and helps your body use insulin. Walking is an easy way to get exercise. Gradually increase the amount you walk every day. You also may want to swim, bike, or do other activities. When you eat out  · Learn to estimate the serving sizes of foods that have carbohydrate. If you measure food at home, it will be easier to estimate the amount in a serving of restaurant food. · If the meal you order has too much carbohydrate (such as potatoes, corn, or baked beans), ask to have a low-carbohydrate food instead. Ask for a salad or green vegetables. · If you use insulin, check your blood sugar before and after eating out to help you plan how much to eat in the future. · If you eat more carbohydrate at a meal than you had planned, take a walk or do other exercise. This will help lower your blood sugar. Where can you learn more? Go to [employer]  Enter N552 in the search box to learn more about \"Nutrition Tips for Diabetes: After Your Visit. \"   © 1228-6024 [employer]. Care instructions adapted under license by [employer] (which disclaims liability or warranty for this information).  This care instruction is for use with your licensed healthcare professional. If you have questions about a medical condition or this instruction, always ask your healthcare professional. Ella Godinez disclaims any warranty or liability for your use of this information. Content Version: 06.2.779970; Current as of: June 4, 2014                  Please keep your follow-up appointment with Nicole Serrano NP. Health Maintenance Due   Topic Date Due    DTaP/Tdap/Td series (1 - Tdap) Never done    Shingrix Vaccine Age 50> (1 of 2) Never done    Breast Cancer Screen Mammogram  Never done    Foot Exam Q1  04/25/2020    MICROALBUMIN Q1  04/26/2020    Lipid Screen  09/19/2020    Eye Exam Retinal or Dilated  11/09/2020    COVID-19 Vaccine (2 - Moderna 2-dose series) 04/03/2021       I have discussed the diagnosis with the patient and the intended plan as seen in the above orders. Patient is in agreement. The patient has received an after-visit summary and questions were answered concerning future plans. I have discussed medication side effects and warnings with the patient as well. Warning signs for the above conditions were discussed including when to call our office or go to the emergency room. The nurse provided the patient and/or family with advanced directive information if needed and encouraged the patient to provide a copy to the office when available.

## 2021-08-03 ENCOUNTER — OFFICE VISIT (OUTPATIENT)
Dept: INTERNAL MEDICINE CLINIC | Age: 61
End: 2021-08-03
Payer: COMMERCIAL

## 2021-08-03 VITALS
BODY MASS INDEX: 44.41 KG/M2 | RESPIRATION RATE: 16 BRPM | SYSTOLIC BLOOD PRESSURE: 140 MMHG | WEIGHT: 293 LBS | TEMPERATURE: 98.1 F | OXYGEN SATURATION: 98 % | HEIGHT: 68 IN | HEART RATE: 88 BPM | DIASTOLIC BLOOD PRESSURE: 90 MMHG

## 2021-08-03 DIAGNOSIS — M54.50 ACUTE BILATERAL LOW BACK PAIN WITHOUT SCIATICA: ICD-10-CM

## 2021-08-03 DIAGNOSIS — I10 ESSENTIAL HYPERTENSION: ICD-10-CM

## 2021-08-03 DIAGNOSIS — Z53.20 MAMMOGRAM DECLINED: ICD-10-CM

## 2021-08-03 DIAGNOSIS — E78.00 HYPERCHOLESTEREMIA: ICD-10-CM

## 2021-08-03 DIAGNOSIS — E66.01 OBESITY, MORBID (HCC): ICD-10-CM

## 2021-08-03 DIAGNOSIS — E11.8 TYPE 2 DIABETES MELLITUS WITH COMPLICATION, WITHOUT LONG-TERM CURRENT USE OF INSULIN (HCC): Primary | ICD-10-CM

## 2021-08-03 LAB — HBA1C MFR BLD HPLC: 7.7 %

## 2021-08-03 PROCEDURE — 83036 HEMOGLOBIN GLYCOSYLATED A1C: CPT | Performed by: NURSE PRACTITIONER

## 2021-08-03 PROCEDURE — 3051F HG A1C>EQUAL 7.0%<8.0%: CPT | Performed by: NURSE PRACTITIONER

## 2021-08-03 PROCEDURE — 99396 PREV VISIT EST AGE 40-64: CPT | Performed by: NURSE PRACTITIONER

## 2021-08-03 RX ORDER — DICLOFENAC SODIUM 75 MG/1
75 TABLET, DELAYED RELEASE ORAL 2 TIMES DAILY
Qty: 60 TABLET | Refills: 0 | Status: SHIPPED | OUTPATIENT
Start: 2021-08-03

## 2021-08-03 NOTE — PROGRESS NOTES
Jet Capps  Identified pt with two pt identifiers(name and ). Chief Complaint   Patient presents with    Annual Wellness Visit     Room 1A //       Reviewed record In preparation for visit and have obtained necessary documentation. 1. Have you been to the ER, urgent care clinic or hospitalized since your last visit? No     2. Have you seen or consulted any other health care providers outside of the [address] [place] [address] since your last visit? Include any pap smears or colon screening. No    Patient has an advance directive. Vitals reviewed with provider.     Health Maintenance reviewed:     Health Maintenance Due   Topic    DTaP/Tdap/Td series (1 - Tdap)    Shingrix Vaccine Age 49> (1 of 2)    Breast Cancer Screen Mammogram     Foot Exam Q1     MICROALBUMIN Q1     Lipid Screen     Eye Exam Retinal or Dilated     COVID-19 Vaccine (2 - Moderna 2-dose series)          Wt Readings from Last 3 Encounters:   21 334 lb 3.2 oz (151.6 kg)   20 312 lb (141.5 kg)   20 302 lb 6.4 oz (137.2 kg)        Temp Readings from Last 3 Encounters:   21 98.1 °F (36.7 °C) (Oral)   20 98.9 °F (37.2 °C) (Oral)   20 98.6 °F (37 °C) (Oral)        BP Readings from Last 3 Encounters:   21 (!) 162/86   20 129/77   20 130/80        Pulse Readings from Last 3 Encounters:   21 88   20 (!) 103   20 (!) 101        Vitals:    21 0750   BP: (!) 162/86   Pulse: 88   Resp: 16   Temp: 98.1 °F (36.7 °C)   TempSrc: Oral   SpO2: 98%   Weight: 334 lb 3.2 oz (151.6 kg)   Height: 5' 8\" (1.727 m)   PainSc:   5   PainLoc: Buttocks          Learning Assessment:   :       Learning Assessment 2019   PRIMARY LEARNER Patient   BARRIERS PRIMARY LEARNER NONE   CO-LEARNER CAREGIVER No   PRIMARY LANGUAGE ENGLISH   LEARNER PREFERENCE PRIMARY READING   ANSWERED BY self   RELATIONSHIP SELF        Depression Screening:   :       3 most recent PHQ Screens 8/3/2021 Little interest or pleasure in doing things Not at all   Feeling down, depressed, irritable, or hopeless Not at all   Total Score PHQ 2 0        Fall Risk Assessment:   :     No flowsheet data found. Abuse Screening:   :       Abuse Screening Questionnaire 8/3/2021   Do you ever feel afraid of your partner? N   Are you in a relationship with someone who physically or mentally threatens you? N   Is it safe for you to go home?  Y        ADL Screening:   :       ADL Assessment 8/3/2021   Feeding yourself No Help Needed   Getting from bed to chair No Help Needed   Getting dressed No Help Needed   Bathing or showering No Help Needed   Walk across the room (includes cane/walker) No Help Needed   Using the telphone No Help Needed   Taking your medications No Help Needed   Preparing meals No Help Needed   Managing money (expenses/bills) No Help Needed   Moderately strenuous housework (laundry) No Help Needed   Shopping for personal items (toiletries/medicines) No Help Needed   Shopping for groceries No Help Needed   Driving No Help Needed   Climbing a flight of stairs No Help Needed   Getting to places beyond walking distances No Help Needed

## 2021-08-03 NOTE — PATIENT INSTRUCTIONS
Learning About Carbohydrate (Carb) Counting and Eating Out When You Have Diabetes  Why plan your meals? Meal planning can be a key part of managing diabetes. Planning meals and snacks with the right balance of carbohydrate, protein, and fat can help you keep your blood sugar at the target level you set with your doctor. You don't have to eat special foods. You can eat what your family eats, including sweets once in a while. But you do have to pay attention to how often you eat and how much you eat of certain foods. You may want to work with a dietitian or a certified diabetes educator. He or she can give you tips and meal ideas and can answer your questions about meal planning. This health professional can also help you reach a healthy weight if that is one of your goals. What should you know about eating carbs? Managing the amount of carbohydrate (carbs) you eat is an important part of healthy meals when you have diabetes. Carbohydrate is found in many foods. · Learn which foods have carbs. And learn the amounts of carbs in different foods. ? Bread, cereal, pasta, and rice have about 15 grams of carbs in a serving. A serving is 1 slice of bread (1 ounce), ½ cup of cooked cereal, or 1/3 cup of cooked pasta or rice. ? Fruits have 15 grams of carbs in a serving. A serving is 1 small fresh fruit, such as an apple or orange; ½ of a banana; ½ cup of cooked or canned fruit; ½ cup of fruit juice; 1 cup of melon or raspberries; or 2 tablespoons of dried fruit. ? Milk and no-sugar-added yogurt have 15 grams of carbs in a serving. A serving is 1 cup of milk or 2/3 cup of no-sugar-added yogurt. ? Starchy vegetables have 15 grams of carbs in a serving. A serving is ½ cup of mashed potatoes or sweet potato; 1 cup winter squash; ½ of a small baked potato; ½ cup of cooked beans; or ½ cup cooked corn or green peas.   · Learn how much carbs to eat each day and at each meal. A dietitian or CDE can teach you how to keep track of the amount of carbs you eat. This is called carbohydrate counting. · If you are not sure how to count carbohydrate grams, use the Plate Method to plan meals. It is a good, quick way to make sure that you have a balanced meal. It also helps you spread carbs throughout the day. ? Divide your plate by types of foods. Put non-starchy vegetables on half the plate, meat or other protein food on one-quarter of the plate, and a grain or starchy vegetable in the final quarter of the plate. To this you can add a small piece of fruit and 1 cup of milk or yogurt, depending on how many carbs you are supposed to eat at a meal.  · Try to eat about the same amount of carbs at each meal. Do not \"save up\" your daily allowance of carbs to eat at one meal.  · Proteins have very little or no carbs per serving. Examples of proteins are beef, chicken, turkey, fish, eggs, tofu, cheese, cottage cheese, and peanut butter. A serving size of meat is 3 ounces, which is about the size of a deck of cards. Examples of meat substitute serving sizes (equal to 1 ounce of meat) are 1/4 cup of cottage cheese, 1 egg, 1 tablespoon of peanut butter, and ½ cup of tofu. How can you eat out and still eat healthy? · Learn to estimate the serving sizes of foods that have carbohydrate. If you measure food at home, it will be easier to estimate the amount in a serving of restaurant food. · If the meal you order has too much carbohydrate (such as potatoes, corn, or baked beans), ask to have a low-carbohydrate food instead. Ask for a salad or green vegetables. · If you use insulin, check your blood sugar before and after eating out to help you plan how much to eat in the future. · If you eat more carbohydrate at a meal than you had planned, take a walk or do other exercise. This will help lower your blood sugar. What are some tips for eating healthy? · Limit saturated fat, such as the fat from meat and dairy products.  This is a healthy choice because people who have diabetes are at higher risk of heart disease. So choose lean cuts of meat and nonfat or low-fat dairy products. Use olive or canola oil instead of butter or shortening when cooking. · Don't skip meals. Your blood sugar may drop too low if you skip meals and take insulin or certain medicines for diabetes. · Check with your doctor before you drink alcohol. Alcohol can cause your blood sugar to drop too low. Alcohol can also cause a bad reaction if you take certain diabetes medicines. Follow-up care is a key part of your treatment and safety. Be sure to make and go to all appointments, and call your doctor if you are having problems. It's also a good idea to know your test results and keep a list of the medicines you take. Where can you learn more? Go to http://www.escalona.com/  Enter I147 in the search box to learn more about \"Learning About Carbohydrate (Carb) Counting and Eating Out When You Have Diabetes. \"  Current as of: August 31, 2020               Content Version: 12.8  © 2006-2021 [employer]. Care instructions adapted under license by [employer] (which disclaims liability or warranty for this information). If you have questions about a medical condition or this instruction, always ask your healthcare professional. [address] any warranty or liability for your use of this information. Starting a Weight Loss Plan: Care Instructions  Overview     If you're thinking about losing weight, it can be hard to know where to start. Your doctor can help you set up a weight loss plan that best meets your needs. You may want to take a class on nutrition or exercise, or you could join a weight loss support group.  If you have questions about how to make changes to your eating or exercise habits, ask your doctor about seeing a registered dietitian or an exercise specialist.  It can be a big challenge to lose weight. But you don't have to make huge changes at once. Make small changes, and stick with them. When those changes become habit, add a few more changes. If you don't think you're ready to make changes right now, try to pick a date in the future. Make an appointment to see your doctor to discuss whether the time is right for you to start a plan. Follow-up care is a key part of your treatment and safety. Be sure to make and go to all appointments, and call your doctor if you are having problems. It's also a good idea to know your test results and keep a list of the medicines you take. How can you care for yourself at home? · Set realistic goals. Many people expect to lose much more weight than is likely. A weight loss of 5% to 10% of your body weight may be enough to improve your health. · Get family and friends involved to provide support. Talk to them about why you are trying to lose weight, and ask them to help. They can help by participating in exercise and having meals with you, even if they may be eating something different. · Find what works best for you. If you do not have time or do not like to cook, a program that offers meal replacement bars or shakes may be better for you. Or if you like to prepare meals, finding a plan that includes daily menus and recipes may be best.  · Ask your doctor about other health professionals who can help you achieve your weight loss goals. ? A dietitian can help you make healthy changes in your diet. ? An exercise specialist or  can help you develop a safe and effective exercise program.  ? A counselor or psychiatrist can help you cope with issues such as depression, anxiety, or family problems that can make it hard to focus on weight loss. · Consider joining a support group for people who are trying to lose weight. Your doctor can suggest groups in your area. Where can you learn more?   Go to http://www.gray.com/  Enter U357 in the search box to learn more about \"Starting a Weight Loss Plan: Care Instructions. \"  Current as of: September 23, 2020               Content Version: 12.8  © 2006-2021 [employer]. Care instructions adapted under license by [employer] (which disclaims liability or warranty for this information). If you have questions about a medical condition or this instruction, always ask your healthcare professional. [place] [address] any warranty or liability for your use of this information. Learning About Obesity  What is obesity? Obesity means having an unhealthy amount of body fat. This puts your health in danger. It can lead to other health problems, such as type 2 diabetes and high blood pressure. How do you know if your weight is in the obesity range? To know if your weight is in the obesity range, your doctor looks at your body mass index (BMI) and waist size. BMI is a number that is calculated from your weight and your height. To figure out your BMI for yourself, you can use an online tool, such as http://www.wilkerson.com/ on the [employer] of [employer]. If your BMI is 30.0 or higher, it falls within the obesity range. Keep in mind that BMI and waist size are only guides. They are not tools to determine your ideal body weight. What causes obesity? When you take in more calories than you burn off, you gain weight. How you eat, how active you are, and other things affect how your body uses calories and whether you gain weight. If you have family members who have too much body fat, you may have inherited a tendency to gain weight. And your family also helps form your eating and lifestyle habits, which can lead to obesity. Also, our busy lives make it harder to plan and cook healthy meals. For many of us, it's easier to reach for prepared foods, go out to eat, or go to the drive-through.  But these foods are often high in saturated fat and calories. Portions are often too large. What can you do to reach a healthy weight? Focus on health, not diets. Diets are hard to stay on and don't work in the long run. It is very hard to stay with a diet that includes lots of big changes in your eating habits. Instead of a diet, focus on lifestyle changes that will improve your health and achieve the right balance of energy and calories. To lose weight, you need to burn more calories than you take in. You can do it by eating healthy foods in reasonable amounts and becoming more active, even a little bit every day. Making small changes over time can add up to a lot. Make a plan for change. Many people have found that naming their reasons for change and staying focused on their plan can make a big difference. Work with your doctor to create a plan that is right for you. · Ask yourself: Tracy Morse are my personal, most powerful reasons for wanting this change? What will my life look like when I've made the change? \"  · Set your long-term goal. Make it specific, such as \"I will lose x pounds. \"  · Break your long-term goal into smaller, short-term goals. Make these small steps specific and within your reach, things you know you can do. These steps are what keep you going from day to day. Talk with your doctor about other weight-loss options. If you have a BMI in a certain range and have not been able to lose weight with diet and exercise, medicine or surgery may be an option for you. Before your doctor will prescribe medicines or surgery, he or she will probably want you to be more active and follow your healthy eating plan for a period of time. These habits are key lifelong changes for managing your weight, with or without other medical treatment. And these changes can help you avoid weight-related health problems. How can you stay on your plan for change? Be ready.  Choose to start during a time when there are few events like holidays, social events, and high-stress periods. These events might trigger slip-ups. Decide on your first few steps. Most people have more success when they make small changes, one step at a time. For example, you might switch a daily candy bar to a piece of fruit, walk 10 minutes more, or add more vegetables to a meal.  Line up your support people. Make sure you're not going to be alone as you make this change. Connect with people who understand how important it is to you. Ask family members and friends for help in keeping with your plan. And think about who could make it harder for you, and how to handle them. Try tracking. People who keep track of what they eat, feel, and do are better at losing weight. Try writing down things like:  · What and how much you eat. · How you feel before and after each meal.  · Details about each meal (like eating out or at home, eating alone, or with friends or family). · What you do to be active. Look and plan. As you track, look for patterns that you may want to change. Take note of:  · When you eat and whether you skip meals. · How often you eat out. · How many fruits and vegetables you eat. · When you eat beyond feeling full. · When and why you eat for reasons other than being hungry. When you stray from your plan, don't get upset. Figure out what made you slip up and how you can fix it. Can you take medicines or have surgery to lose weight? If you have a BMI in a certain range and have not been able to lose weight with diet and exercise, medicine or surgery may be an option for you. If you have a BMI of at least 30.0 (or a BMI of at least 27.0 and another health problem related to your weight), ask your doctor about weight-loss medicines. They work by making you feel less hungry, making you feel full more quickly, or changing how you digest fat. Medicines are used along with diet changes and more physical activity to help you make lasting changes.   If you have a BMI of 40.0 or more (or a BMI of 35.0 or more and another health problem related to your weight), your doctor may talk with you about surgery. Weight-loss surgery has risks, and you will need to work with your doctor to compare the risk of having obesity with the risks of surgery. With any option you choose, you will still need to eat a healthy diet and get regular exercise. Follow-up care is a key part of your treatment and safety. Be sure to make and go to all appointments, and call your doctor if you are having problems. It's also a good idea to know your test results and keep a list of the medicines you take. Where can you learn more? Go to http://www.gray.com/  Enter N111 in the search box to learn more about \"Learning About Obesity. \"  Current as of: September 23, 2020               Content Version: 12.8  © 2006-2021 [employer]. Care instructions adapted under license by [employer] (which disclaims liability or warranty for this information). If you have questions about a medical condition or this instruction, always ask your healthcare professional. [address] any warranty or liability for your use of this information. Nutrition Tips for Diabetes: After Your Visit  Your Care Instructions  A healthy diet is important to manage diabetes. It helps you lose weight (if you need to) and keep it off. It gives you the nutrition and energy your body needs and helps prevent heart disease. But a diet for diabetes does not mean that you have to eat special foods. You can eat what your family eats, including occasional sweets and other favorites. But you do have to pay attention to how often you eat and how much you eat of certain foods. The right plan for you will give you meals that help you keep your blood sugar at healthy levels. Try to eat a variety of foods and to spread carbohydrate throughout the day.  Carbohydrate raises blood sugar higher and more quickly than any other nutrient does. Carbohydrate is found in sugar, breads and cereals, fruit, starchy vegetables such as potatoes and corn, and milk and yogurt. You may want to work with a dietitian or diabetes educator to help you plan meals and snacks. A dietitian or diabetes educator also can help you lose weight if that is one of your goals. The following tips can help you enjoy your meals and stay healthy. Follow-up care is a key part of your treatment and safety. Be sure to make and go to all appointments, and call your doctor if you are having problems. Its also a good idea to know your test results and keep a list of the medicines you take. How can you care for yourself at home? · Learn which foods have carbohydrate and how much carbohydrate to eat. A dietitian or diabetes educator can help you learn to keep track of how much carbohydrate you eat. · Spread carbohydrate throughout the day. Eat some carbohydrate at all meals, but do not eat too much at any one time. · Plan meals to include food from all the food groups. These are the food groups and some example portion sizes:  ¨ Grains: 1 slice of bread (1 ounce), ½ cup of cooked cereal, and 1/3 cup of cooked pasta or rice. These have about 15 grams of carbohydrate in a serving. Choose whole grains such as whole wheat bread or crackers, oatmeal, and brown rice more often than refined grains. ¨ Fruit: 1 small fresh fruit, such as an apple or orange; ½ of a banana; ½ cup of chopped, cooked, or canned fruit; ½ cup of fruit juice; 1 cup of melon or raspberries; and 2 tablespoons of dried fruit. These have about 15 grams of carbohydrate in a serving. ¨ Dairy: 1 cup of nonfat or low-fat milk and 2/3 cup of plain yogurt. These have about 15 grams of carbohydrate in a serving. ¨ Protein foods: Beef, chicken, turkey, fish, eggs, tofu, cheese, cottage cheese, and peanut butter.  A serving size of meat is 3 ounces, which is about the size of a deck of cards. Examples of meat substitute serving sizes (equal to 1 ounce of meat) are 1/4 cup of cottage cheese, 1 egg, 1 tablespoon of peanut butter, and ½ cup of tofu. These have very little or no carbohydrate per serving. ¨ Vegetables: Starchy vegetables such as ½ cup of cooked dried beans, peas, potatoes, or corn have about 15 grams of carbohydrate. Nonstarchy vegetables have very little carbohydrate, such as 1 cup of raw leafy vegetables (such as spinach), ½ cup of other vegetables (cooked or chopped), and 3/4 cup of vegetable juice. · Use the plate format to plan meals. It is a good, quick way to make sure that you have a balanced meal. It also helps you spread carbohydrate throughout the day. You divide your plate by types of foods. Put vegetables on half the plate, meat or meat substitutes on one-quarter of the plate, and a grain or starchy vegetable (such as brown rice or a potato) in the final quarter of the plate. To this you can add a small piece of fruit and 1 cup of milk or yogurt, depending on how much carbohydrate you are supposed to eat at a meal.  · Talk to your dietitian or diabetes educator about ways to add limited amounts of sweets into your meal plan. You can eat these foods now and then, as long as you include the amount of carbohydrate they have in your daily carbohydrate allowance. · If you drink alcohol, limit it to no more than 1 drink a day for women and 2 drinks a day for men. If you are pregnant, no amount of alcohol is known to be safe. · Protein, fat, and fiber do not raise blood sugar as much as carbohydrate does. If you eat a lot of these nutrients in a meal, your blood sugar will rise more slowly than it would otherwise. · Limit saturated fats, such as those from meat and dairy products. Try to replace it with monounsaturated fat, such as olive oil. This is a healthier choice because people who have diabetes are at higher-than-average risk of heart disease.  But use a modest amount of olive oil. A tablespoon of olive oil has 14 grams of fat and 120 calories. · Exercise lowers blood sugar. If you take insulin by shots or pump, you can use less than you would if you were not exercising. Keep in mind that timing matters. If you exercise within 1 hour after a meal, your body may need less insulin for that meal than it would if you exercised 3 hours after the meal. Test your blood sugar to find out how exercise affects your need for insulin. · Exercise on most days of the week. Aim for at least 30 minutes. Exercise helps you stay at a healthy weight and helps your body use insulin. Walking is an easy way to get exercise. Gradually increase the amount you walk every day. You also may want to swim, bike, or do other activities. When you eat out  · Learn to estimate the serving sizes of foods that have carbohydrate. If you measure food at home, it will be easier to estimate the amount in a serving of restaurant food. · If the meal you order has too much carbohydrate (such as potatoes, corn, or baked beans), ask to have a low-carbohydrate food instead. Ask for a salad or green vegetables. · If you use insulin, check your blood sugar before and after eating out to help you plan how much to eat in the future. · If you eat more carbohydrate at a meal than you had planned, take a walk or do other exercise. This will help lower your blood sugar. Where can you learn more? Go to EdRover.be  Enter I435 in the search box to learn more about \"Nutrition Tips for Diabetes: After Your Visit. \"   © 6027-3105 [employer]. Care instructions adapted under license by [facility] (which disclaims liability or warranty for this information).  This care instruction is for use with your licensed healthcare professional. If you have questions about a medical condition or this instruction, always ask your healthcare professional. Gertr[employer] [place] disclaims any warranty or liability for your use of this information.   Content Version: 95.1.046134; Current as of: June 4, 2014

## 2021-08-04 ENCOUNTER — TELEPHONE (OUTPATIENT)
Dept: INTERNAL MEDICINE CLINIC | Age: 61
End: 2021-08-04

## 2021-08-04 NOTE — TELEPHONE ENCOUNTER
Attempted to reach patient by phone. Unable to reach patient or leave message requesting a return call as mailbox is full. Need to ask about prescription coverage as working on prior authorization.

## 2021-08-05 NOTE — TELEPHONE ENCOUNTER
Attempted to reach patient by phone. Unable to reach patient or leave message requesting a return call as mailbox is full.

## 2021-08-09 ENCOUNTER — TELEPHONE (OUTPATIENT)
Dept: INTERNAL MEDICINE CLINIC | Age: 61
End: 2021-08-09

## 2021-08-09 NOTE — TELEPHONE ENCOUNTER
Patient returned call and gave Rx coverage information. Bin: [id number] PCN: [id number] and grp: [id number] and ID: [id number].

## 2021-08-09 NOTE — TELEPHONE ENCOUNTER
Patient called and wanted to see if we have received a fax from [employer] for paper work to be filled out for [facility].  Please give patient a call back to let her know

## 2021-08-09 NOTE — TELEPHONE ENCOUNTER
Verified patients name and date of birth. Spoke with patient and advised prior authorization for [employer] has been received but rx coverage information needed. Received Rx coverage information from patient.

## 2021-08-10 NOTE — TELEPHONE ENCOUNTER
I called the patient and verified them by name and date of birth. I confirmed her insurance information. I could not get the PA form to go through online and tried to call her insurance company. I disconnected the call after being on hold for 30 minutes without talking to someone. I will try again another day.

## 2021-08-12 NOTE — TELEPHONE ENCOUNTER
I called the patients insurance company and they do not do over the phone prior authorizations. They will fax over a form to be completed.

## 2022-01-02 DIAGNOSIS — E11.8 TYPE 2 DIABETES MELLITUS WITH COMPLICATION, WITHOUT LONG-TERM CURRENT USE OF INSULIN (HCC): ICD-10-CM

## 2022-01-02 DIAGNOSIS — I10 ESSENTIAL HYPERTENSION: ICD-10-CM

## 2022-01-02 RX ORDER — LISINOPRIL 10 MG/1
TABLET ORAL
Qty: 90 TABLET | Refills: 3 | Status: SHIPPED | OUTPATIENT
Start: 2022-01-02

## 2022-01-02 RX ORDER — METFORMIN HYDROCHLORIDE 1000 MG/1
TABLET ORAL
Qty: 180 TABLET | Refills: 1 | Status: SHIPPED | OUTPATIENT
Start: 2022-01-02 | End: 2022-11-02 | Stop reason: SDUPTHER

## 2022-02-12 DIAGNOSIS — I10 ESSENTIAL HYPERTENSION: ICD-10-CM

## 2022-02-13 RX ORDER — CARVEDILOL 3.12 MG/1
TABLET ORAL
Qty: 180 TABLET | Refills: 2 | Status: SHIPPED | OUTPATIENT
Start: 2022-02-13

## 2022-03-19 PROBLEM — E66.01 OBESITY, MORBID (HCC): Status: ACTIVE | Noted: 2019-01-25

## 2022-04-02 DIAGNOSIS — E11.8 TYPE 2 DIABETES MELLITUS WITH COMPLICATION, WITHOUT LONG-TERM CURRENT USE OF INSULIN (HCC): ICD-10-CM

## 2022-04-03 RX ORDER — GLIPIZIDE 5 MG/1
TABLET ORAL
Qty: 180 TABLET | Refills: 4 | Status: SHIPPED | OUTPATIENT
Start: 2022-04-03 | End: 2022-09-07

## 2022-07-25 ENCOUNTER — OFFICE VISIT (OUTPATIENT)
Dept: INTERNAL MEDICINE CLINIC | Age: 62
End: 2022-07-25
Payer: COMMERCIAL

## 2022-07-25 VITALS
HEIGHT: 68 IN | RESPIRATION RATE: 20 BRPM | HEART RATE: 102 BPM | OXYGEN SATURATION: 94 % | SYSTOLIC BLOOD PRESSURE: 132 MMHG | WEIGHT: 293 LBS | DIASTOLIC BLOOD PRESSURE: 89 MMHG | TEMPERATURE: 98 F | BODY MASS INDEX: 44.41 KG/M2

## 2022-07-25 DIAGNOSIS — E66.01 OBESITY, MORBID (HCC): ICD-10-CM

## 2022-07-25 DIAGNOSIS — E11.29 TYPE 2 DIABETES MELLITUS WITH MICROALBUMINURIA, WITHOUT LONG-TERM CURRENT USE OF INSULIN (HCC): ICD-10-CM

## 2022-07-25 DIAGNOSIS — E78.00 HYPERCHOLESTEREMIA: ICD-10-CM

## 2022-07-25 DIAGNOSIS — Z12.31 ENCOUNTER FOR SCREENING MAMMOGRAM FOR MALIGNANT NEOPLASM OF BREAST: ICD-10-CM

## 2022-07-25 DIAGNOSIS — I10 PRIMARY HYPERTENSION: ICD-10-CM

## 2022-07-25 DIAGNOSIS — Z00.01 ENCOUNTER FOR PREVENTATIVE ADULT HEALTH CARE EXAM WITH ABNORMAL FINDINGS: Primary | ICD-10-CM

## 2022-07-25 DIAGNOSIS — R80.9 TYPE 2 DIABETES MELLITUS WITH MICROALBUMINURIA, WITHOUT LONG-TERM CURRENT USE OF INSULIN (HCC): ICD-10-CM

## 2022-07-25 PROCEDURE — 99396 PREV VISIT EST AGE 40-64: CPT | Performed by: PHYSICIAN ASSISTANT

## 2022-07-25 RX ORDER — LOVASTATIN 20 MG/1
20 TABLET ORAL
Qty: 30 TABLET | Refills: 5 | Status: SHIPPED | OUTPATIENT
Start: 2022-07-25

## 2022-07-25 NOTE — PROGRESS NOTES
Leyda Espinoza is a 65 y.o. female  Chief Complaint   Patient presents with    Follow-up     1. Have you been to the ER, urgent care clinic since your last visit? Hospitalized since your last visit? No    2. Have you seen or consulted any other health care providers outside of the [address] [place] [address] since your last visit? Include any pap smears or colon screening. No  Health Maintenance   Topic Date Due    Pneumococcal 0-64 years (1 - PCV) Never done    DTaP/Tdap/Td series (1 - Tdap) Never done    Shingrix Vaccine Age 50> (1 of 2) Never done    Breast Cancer Screen Mammogram  Never done    MICROALBUMIN Q1  04/26/2020    Lipid Screen  09/19/2020    Eye Exam Retinal or Dilated  11/09/2020    COVID-19 Vaccine (2 - Booster for Alice series) 05/01/2021    Depression Screen  08/03/2022    Foot Exam Q1  08/03/2022    A1C test (Diabetic or Prediabetic)  08/03/2022    Flu Vaccine (1) 09/01/2022    Colorectal Cancer Screening Combo  11/10/2024    Cervical cancer screen  11/14/2024    Hepatitis C Screening  Completed     Visit Vitals  /89 (BP 1 Location: Left upper arm, BP Patient Position: At rest, BP Cuff Size: Large adult)   Pulse (!) 102   Temp 98 °F (36.7 °C) (Skin)   Resp 20   Ht 5' 8\" (1.727 m)   Wt 317 lb (143.8 kg)   SpO2 94%   BMI 48.20 kg/m²       .

## 2022-07-25 NOTE — PROGRESS NOTES
Anna Salinas is a 65y.o. year old female seen in clinic today for   Chief Complaint   Patient presents with    Follow-up    Sinus Infection     Treated with z itzel better but not well, also covid negative       she is here today to follow up for HTN, DM2, HLD, Obesity    DM2: unable to afford Rybelsus. Using Glipizide 5 mg BID, Metformin 1000 mg BID. Checking sugars routinely fasting 110-130. Eye exam this Saturday. Hx of microalbuminuria. Lost 15 pounds from last year. HTN: Not checking BP. Uses Carvedilol 3.125 BID, Lisinopril 10 mg. HLD: Had muscle cramping with crestor, stopped months ago. Okay with trying new med. Diet: Fair, has cut out areas of daily dieting. Lunch healthy, tuna and grapes. Exercise: minimal, has problems with R hip soreness after injury several years ago. Mammo: Needed, struggles to find time to complete working M-F  Colon : given 10 year window, unsure which year she had done. Pap: Done in 2019. Normal, due in 2024    she specifically denies any CP, SOB, HA. Dizziness, fevers, chills, N/V/D, urinary symptoms or other bowel changes. Current Outpatient Medications on File Prior to Visit   Medication Sig Dispense Refill    glipiZIDE (GLUCOTROL) 5 mg tablet TAKE ONE TABLET BY MOUTH TWICE A  Tablet 4    carvediloL (COREG) 3.125 mg tablet TAKE ONE TABLET BY MOUTH TWICE A DAY WITH MEAL(S) 180 Tablet 2    metFORMIN (GLUCOPHAGE) 1,000 mg tablet TAKE ONE TABLET BY MOUTH TWICE A DAY WITH MEALS 180 Tablet 1    lisinopriL (PRINIVIL, ZESTRIL) 10 mg tablet TAKE ONE TABLET BY MOUTH ONE TIME DAILY 90 Tablet 3    OTHER       psyllium (METAMUCIL) powd Take  by mouth. diclofenac EC (VOLTAREN) 75 mg EC tablet Take 1 Tablet by mouth two (2) times a day. (Patient not taking: Reported on 7/25/2022) 60 Tablet 0    [DISCONTINUED] semaglutide (RYBELSUS) 3 mg tablet Take 1 Tablet by mouth Daily (before breakfast).  (Patient not taking: Reported on 7/25/2022) 30 Tablet 2 [DISCONTINUED] rosuvastatin (CRESTOR) 20 mg tablet Take 1 Tab by mouth nightly. Indications: high cholesterol (Patient not taking: Reported on 2022) 90 Tab 1     No current facility-administered medications on file prior to visit. No Known Allergies  Past Medical History:   Diagnosis Date    Diabetes ([address]     HTN (hypertension)     Hypercholesteremia       Past Surgical History:   Procedure Laterality Date    HX  SECTION          Family History   Problem Relation Age of Onset    Hypertension Mother     Cancer Father         lung    Heart Disease Father     Hypertension Father     Parkinson's Disease Father     Dementia Father     Bipolar Disorder Other         daughter        Social History     Socioeconomic History    Marital status:      Spouse name: Not on file    Number of children: Not on file    Years of education: Not on file    Highest education level: Not on file   Occupational History    Not on file   Tobacco Use    Smoking status: Former     Packs/day: 1.00     Years: 20.00     Pack years: 20.00     Types: Cigarettes     Quit date: 19     Years since quittin.5    Smokeless tobacco: Never   Vaping Use    Vaping Use: Never used   Substance and Sexual Activity    Alcohol use: No     Comment: quit in     Drug use: No    Sexual activity: Yes     Comment: menopausal   Other Topics Concern    Not on file   Social History Narrative    Works at [employer].  and lives with spouse.      Social Determinants of Health     Financial Resource Strain: Low Risk     Difficulty of Paying Living Expenses: Not hard at all   Food Insecurity: No Food Insecurity    Worried About Running Out of Food in the Last Year: Never true    Ran Out of Food in the Last Year: Never true   Transportation Needs: Not on file   Physical Activity: Not on file   Stress: Not on file   Social Connections: Not on file   Intimate Partner Violence: Not on file   Housing Stability: Not on file           Visit Vitals  /89 (BP 1 Location: Left upper arm, BP Patient Position: At rest, BP Cuff Size: Large adult)   Pulse (!) 102   Temp 98 °F (36.7 °C) (Skin)   Resp 20   Ht 5' 8\" (1.727 m)   Wt 317 lb (143.8 kg)   SpO2 94%   BMI 48.20 kg/m²       Review of Systems   Constitutional:  Negative for chills, fever, malaise/fatigue and weight loss. HENT:  Negative for ear pain, hearing loss and sore throat. Respiratory:  Negative for cough and shortness of breath. Cardiovascular:  Negative for chest pain and leg swelling. Gastrointestinal:  Negative for abdominal pain, blood in stool, constipation, diarrhea, heartburn, melena, nausea and vomiting. Genitourinary:  Negative for dysuria and hematuria. Musculoskeletal:  Negative for back pain and myalgias. Skin:  Negative for rash. Neurological:  Negative for weakness and headaches. Psychiatric/Behavioral:  Negative for depression. Physical Exam  Vitals and nursing note reviewed. Constitutional:       Appearance: Normal appearance. She is obese. HENT:      Head: Normocephalic and atraumatic. Right Ear: Tympanic membrane, ear canal and external ear normal. There is no impacted cerumen. Left Ear: Tympanic membrane, ear canal and external ear normal. There is no impacted cerumen. Nose: Nose normal.      Mouth/Throat:      Mouth: Mucous membranes are moist.      Pharynx: Oropharynx is clear. Eyes:      Conjunctiva/sclera: Conjunctivae normal.      Pupils: Pupils are equal, round, and reactive to light. Neck:      Vascular: No carotid bruit. Cardiovascular:      Rate and Rhythm: Normal rate and regular rhythm. Pulses: Normal pulses. Heart sounds: Normal heart sounds. No murmur heard. Pulmonary:      Effort: Pulmonary effort is normal.      Breath sounds: Normal breath sounds. No wheezing, rhonchi or rales. Abdominal:      General: Abdomen is flat. Bowel sounds are normal. There is no distension. Palpations: There is no mass. Tenderness: There is no abdominal tenderness. There is no guarding or rebound. Musculoskeletal:         General: Normal range of motion. Cervical back: Normal range of motion and neck supple. No rigidity. Right lower leg: No edema. Left lower leg: No edema. Skin:     General: Skin is warm and dry. Capillary Refill: Capillary refill takes less than 2 seconds. Neurological:      General: No focal deficit present. Mental Status: She is alert and oriented to person, place, and time. Sensory: No sensory deficit. Motor: No weakness. Gait: Gait normal.   Psychiatric:         Mood and Affect: Mood normal.         Behavior: Behavior normal.         Thought Content: Thought content normal.        No results found for this or any previous visit (from the past 24 hour(s)). ASSESSMENT AND PLAN  Diagnoses and all orders for this visit:    1. Encounter for preventative adult health care exam with abnormal findings    2. Primary hypertension  -     CBC WITH AUTOMATED DIFF; Future  -     LIPID PANEL; Future  Continue carvedilol and Lisinopril, advised to check routinely  3. Type 2 diabetes mellitus with microalbuminuria, without long-term current use of insulin ([facility])  -     METABOLIC PANEL, COMPREHENSIVE; Future  -     HEMOGLOBIN A1C WITH EAG; Future  -     MICROALBUMIN, UR, RAND W/ MICROALB/CREAT RATIO; Future  -     LIPID PANEL; Future  -     HM DIABETES FOOT EXAM  Check labs. Fasting labs seem to be at goal with 15 pound weight loss. Continue low carb diet. Continue metformin. Foot exam normal. Continue glipizide. Can always consider increase in glipizide to 1.5 tablets (7.5 mg) in AM and 5 mg in PM if needed   4. Obesity, morbid ([address]  -     TSH 3RD GENERATION; Future  Needs to continue better portion control  5. Hypercholesteremia  -     lovastatin (MEVACOR) 20 mg tablet; Take 1 Tablet by mouth nightly. -     LIPID PANEL;  Future  Trial Lovastatin. If causes cramping will trial Zetia  6. Encounter for screening mammogram for malignant neoplasm of breast  -     TERENCE MAMMO BI SCREENING INCL CAD; Future   Agrees to mammo, try to get on saturday      I have discussed the diagnosis with the patient and the intended plan as seen in the above orders. Patient is in agreement. The patient has received an after-visit summary and questions were answered concerning future plans. I have discussed medication side effects and warnings with the patient as well.     Mando Garcia PA-C

## 2022-07-25 NOTE — PATIENT INSTRUCTIONS
[address] [place] [address], [facility]    Get fasting labs    Mammogram to call you for scheduling--hopeful for Saturday. If not can make next January. Lets get labs. Will plan to see you back in January 6 month follow up.

## 2022-09-07 DIAGNOSIS — E11.8 TYPE 2 DIABETES MELLITUS WITH COMPLICATION, WITHOUT LONG-TERM CURRENT USE OF INSULIN (HCC): ICD-10-CM

## 2022-09-07 LAB
ALBUMIN SERPL-MCNC: 4.4 G/DL (ref 3.8–4.8)
ALBUMIN/CREAT UR: 12 MG/G CREAT (ref 0–29)
ALBUMIN/GLOB SERPL: 1.8 {RATIO} (ref 1.2–2.2)
ALP SERPL-CCNC: 78 IU/L (ref 44–121)
ALT SERPL-CCNC: 15 IU/L (ref 0–32)
AST SERPL-CCNC: 19 IU/L (ref 0–40)
BASOPHILS # BLD AUTO: 0 X10E3/UL (ref 0–0.2)
BASOPHILS NFR BLD AUTO: 1 %
BILIRUB SERPL-MCNC: 0.4 MG/DL (ref 0–1.2)
BUN SERPL-MCNC: 16 MG/DL (ref 8–27)
BUN/CREAT SERPL: 21 (ref 12–28)
CALCIUM SERPL-MCNC: 9.4 MG/DL (ref 8.7–10.3)
CHLORIDE SERPL-SCNC: 100 MMOL/L (ref 96–106)
CHOLEST SERPL-MCNC: 219 MG/DL (ref 100–199)
CO2 SERPL-SCNC: 18 MMOL/L (ref 20–29)
CREAT SERPL-MCNC: 0.78 MG/DL (ref 0.57–1)
CREAT UR-MCNC: 61.6 MG/DL
EGFR: 86 ML/MIN/1.73
EOSINOPHIL # BLD AUTO: 0.2 X10E3/UL (ref 0–0.4)
EOSINOPHIL NFR BLD AUTO: 3 %
ERYTHROCYTE [DISTWIDTH] IN BLOOD BY AUTOMATED COUNT: 13.7 % (ref 11.7–15.4)
EST. AVERAGE GLUCOSE BLD GHB EST-MCNC: 192 MG/DL
GLOBULIN SER CALC-MCNC: 2.4 G/DL (ref 1.5–4.5)
GLUCOSE SERPL-MCNC: 179 MG/DL (ref 65–99)
HBA1C MFR BLD: 8.3 % (ref 4.8–5.6)
HCT VFR BLD AUTO: 43.5 % (ref 34–46.6)
HDLC SERPL-MCNC: 30 MG/DL
HGB BLD-MCNC: 14 G/DL (ref 11.1–15.9)
IMM GRANULOCYTES # BLD AUTO: 0 X10E3/UL (ref 0–0.1)
IMM GRANULOCYTES NFR BLD AUTO: 0 %
LDLC SERPL CALC-MCNC: 133 MG/DL (ref 0–99)
LYMPHOCYTES # BLD AUTO: 2 X10E3/UL (ref 0.7–3.1)
LYMPHOCYTES NFR BLD AUTO: 35 %
MCH RBC QN AUTO: 27.8 PG (ref 26.6–33)
MCHC RBC AUTO-ENTMCNC: 32.2 G/DL (ref 31.5–35.7)
MCV RBC AUTO: 86 FL (ref 79–97)
MICROALBUMIN UR-MCNC: 7.4 UG/ML
MONOCYTES # BLD AUTO: 0.4 X10E3/UL (ref 0.1–0.9)
MONOCYTES NFR BLD AUTO: 6 %
NEUTROPHILS # BLD AUTO: 3.2 X10E3/UL (ref 1.4–7)
NEUTROPHILS NFR BLD AUTO: 55 %
PLATELET # BLD AUTO: 211 X10E3/UL (ref 150–450)
POTASSIUM SERPL-SCNC: 4.8 MMOL/L (ref 3.5–5.2)
PROT SERPL-MCNC: 6.8 G/DL (ref 6–8.5)
RBC # BLD AUTO: 5.04 X10E6/UL (ref 3.77–5.28)
SODIUM SERPL-SCNC: 137 MMOL/L (ref 134–144)
SPECIMEN STATUS REPORT, ROLRST: NORMAL
TRIGL SERPL-MCNC: 310 MG/DL (ref 0–149)
TSH SERPL DL<=0.005 MIU/L-ACNC: 1.88 UIU/ML (ref 0.45–4.5)
VLDLC SERPL CALC-MCNC: 56 MG/DL (ref 5–40)
WBC # BLD AUTO: 5.8 X10E3/UL (ref 3.4–10.8)

## 2022-09-07 RX ORDER — GLIPIZIDE 5 MG/1
TABLET ORAL
Qty: 45 TABLET | Refills: 5 | Status: SHIPPED | OUTPATIENT
Start: 2022-09-07

## 2022-09-07 NOTE — PROGRESS NOTES
Tried to call patient but mail box full. Please inform patient:     Diabetes is not under control. Fasting blood sugars are above 170. We need to increase her glipizide to 7.5 mg (1.5 tablets of her current 5 mg) in morning with her first meal. Please have her set follow up in 3 months to recheck A1C. Her cholesterol is much worse than before, ensure she is taking Lovastatin.      Thyroid was normal

## 2022-11-02 DIAGNOSIS — E11.8 TYPE 2 DIABETES MELLITUS WITH COMPLICATION, WITHOUT LONG-TERM CURRENT USE OF INSULIN (HCC): ICD-10-CM

## 2022-11-02 RX ORDER — METFORMIN HYDROCHLORIDE 1000 MG/1
1000 TABLET ORAL 2 TIMES DAILY WITH MEALS
Qty: 180 TABLET | Refills: 0 | Status: SHIPPED | OUTPATIENT
Start: 2022-11-02

## 2022-11-02 NOTE — TELEPHONE ENCOUNTER
PCP: Rocio Betts PA-C     Last appt: 7/25/2022     Future Appointments   Date Time Provider Hernando Nahomi   1/9/2023  1:00 PM SPT MAMMO 1 SPTMAMMO SPT          Requested Prescriptions     Pending Prescriptions Disp Refills    metFORMIN (GLUCOPHAGE) 1,000 mg tablet 180 Tablet 1     Sig: Take 1 Tablet by mouth two (2) times daily (with meals).

## 2022-11-02 NOTE — TELEPHONE ENCOUNTER
PCP: Sarah Ness PA-C     Last appt: 7/25/2022     Future Appointments   Date Time Provider Hernando Comer   1/9/2023  1:00 PM SPT MAMMO 1 SPTMAMMO SPT          Requested Prescriptions     Pending Prescriptions Disp Refills    metFORMIN (GLUCOPHAGE) 1,000 mg tablet 180 Tablet 1     Sig: Take 1 Tablet by mouth two (2) times daily (with meals).

## 2022-11-25 DIAGNOSIS — I10 ESSENTIAL HYPERTENSION: ICD-10-CM

## 2022-11-25 RX ORDER — CARVEDILOL 3.12 MG/1
TABLET ORAL
Qty: 180 TABLET | Refills: 0 | Status: SHIPPED | OUTPATIENT
Start: 2022-11-25

## 2022-11-25 NOTE — TELEPHONE ENCOUNTER
Requested Prescriptions     Pending Prescriptions Disp Refills    carvediloL (COREG) 3.125 mg tablet 180 Tablet 0     Sig: TAKE ONE TABLET BY MOUTH TWICE A DAY WITH MEAL(S)

## 2022-11-25 NOTE — TELEPHONE ENCOUNTER
PCP: Dc Carrillo PA-C     Last appt: 7/25/2022     Future Appointments   Date Time Provider Hernando Comer   1/9/2023  1:00 PM SPT MAMMO 1 SPTMAMMO SPT   2/13/2023 10:30 AM Dc Carrillo PA-C [facility] AMB          Requested Prescriptions     Pending Prescriptions Disp Refills    carvediloL (COREG) 3.125 mg tablet 180 Tablet 0     Sig: TAKE ONE TABLET BY MOUTH TWICE A DAY WITH MEAL(S)

## 2023-01-09 ENCOUNTER — HOSPITAL ENCOUNTER (OUTPATIENT)
Dept: MAMMOGRAPHY | Age: 63
Discharge: HOME | End: 2023-01-09
Attending: PHYSICIAN ASSISTANT
Payer: COMMERCIAL

## 2023-01-09 DIAGNOSIS — Z12.31 ENCOUNTER FOR SCREENING MAMMOGRAM FOR MALIGNANT NEOPLASM OF BREAST: ICD-10-CM

## 2023-01-09 PROCEDURE — 77063 BREAST TOMOSYNTHESIS BI: CPT

## 2023-01-26 DIAGNOSIS — E78.00 HYPERCHOLESTEREMIA: ICD-10-CM

## 2023-01-26 RX ORDER — LOVASTATIN 20 MG/1
TABLET ORAL
Qty: 30 TABLET | Refills: 5 | Status: SHIPPED | OUTPATIENT
Start: 2023-01-26 | End: 2023-01-29

## 2023-01-29 DIAGNOSIS — E78.00 HYPERCHOLESTEREMIA: ICD-10-CM

## 2023-01-29 RX ORDER — LOVASTATIN 20 MG/1
TABLET ORAL
Qty: 30 TABLET | Refills: 5 | Status: SHIPPED | OUTPATIENT
Start: 2023-01-29

## 2023-02-20 DIAGNOSIS — I10 ESSENTIAL HYPERTENSION: ICD-10-CM

## 2023-02-20 RX ORDER — CARVEDILOL 3.12 MG/1
TABLET ORAL
Qty: 180 TABLET | Refills: 0 | Status: SHIPPED | OUTPATIENT
Start: 2023-02-20

## 2023-02-27 DIAGNOSIS — I10 ESSENTIAL HYPERTENSION: ICD-10-CM

## 2023-02-27 RX ORDER — LISINOPRIL 10 MG/1
10 TABLET ORAL DAILY
Qty: 90 TABLET | Refills: 3 | Status: SHIPPED | OUTPATIENT
Start: 2023-02-27

## 2023-02-27 NOTE — TELEPHONE ENCOUNTER
PCP: Rupret Valles PA-C     Last appt: 7/25/2022     Future Appointments   Date Time Provider Hernando Comer   3/20/2023 11:00 AM Rupert Valles PA-C [facility] AMB          Requested Prescriptions     Pending Prescriptions Disp Refills    lisinopriL (PRINIVIL, ZESTRIL) 10 mg tablet 90 Tablet 3     Sig: Take 1 Tablet by mouth daily.

## 2023-05-10 ENCOUNTER — TELEMEDICINE (OUTPATIENT)
Facility: CLINIC | Age: 63
End: 2023-05-10
Payer: COMMERCIAL

## 2023-05-10 DIAGNOSIS — E78.00 HYPERCHOLESTEREMIA: ICD-10-CM

## 2023-05-10 DIAGNOSIS — E11.29 TYPE 2 DIABETES MELLITUS WITH MICROALBUMINURIA, WITHOUT LONG-TERM CURRENT USE OF INSULIN (HCC): ICD-10-CM

## 2023-05-10 DIAGNOSIS — R80.9 TYPE 2 DIABETES MELLITUS WITH MICROALBUMINURIA, WITHOUT LONG-TERM CURRENT USE OF INSULIN (HCC): ICD-10-CM

## 2023-05-10 DIAGNOSIS — E66.01 OBESITY, MORBID (HCC): Primary | ICD-10-CM

## 2023-05-10 DIAGNOSIS — I10 PRIMARY HYPERTENSION: ICD-10-CM

## 2023-05-10 PROCEDURE — 3051F HG A1C>EQUAL 7.0%<8.0%: CPT | Performed by: PHYSICIAN ASSISTANT

## 2023-05-10 PROCEDURE — 99214 OFFICE O/P EST MOD 30 MIN: CPT | Performed by: PHYSICIAN ASSISTANT

## 2023-05-10 RX ORDER — LISINOPRIL 10 MG/1
10 TABLET ORAL DAILY
COMMUNITY
Start: 2023-03-04

## 2023-05-10 RX ORDER — LOVASTATIN 40 MG/1
40 TABLET ORAL NIGHTLY
COMMUNITY
Start: 2023-04-15

## 2023-05-10 RX ORDER — CARVEDILOL 3.12 MG/1
TABLET ORAL
COMMUNITY
Start: 2023-02-24

## 2023-05-10 RX ORDER — GLIPIZIDE 5 MG/1
5 TABLET ORAL 2 TIMES DAILY
COMMUNITY
Start: 2023-04-08 | End: 2023-05-10 | Stop reason: SDUPTHER

## 2023-05-10 RX ORDER — GLIPIZIDE 5 MG/1
7.5 TABLET ORAL 2 TIMES DAILY
Qty: 90 TABLET | Refills: 5 | Status: SHIPPED | OUTPATIENT
Start: 2023-05-10

## 2023-05-10 RX ORDER — FLUTICASONE PROPIONATE 50 MCG
SPRAY, SUSPENSION (ML) NASAL
COMMUNITY

## 2023-05-10 SDOH — ECONOMIC STABILITY: FOOD INSECURITY: WITHIN THE PAST 12 MONTHS, YOU WORRIED THAT YOUR FOOD WOULD RUN OUT BEFORE YOU GOT MONEY TO BUY MORE.: NEVER TRUE

## 2023-05-10 SDOH — ECONOMIC STABILITY: FOOD INSECURITY: WITHIN THE PAST 12 MONTHS, THE FOOD YOU BOUGHT JUST DIDN'T LAST AND YOU DIDN'T HAVE MONEY TO GET MORE.: NEVER TRUE

## 2023-05-10 SDOH — ECONOMIC STABILITY: HOUSING INSECURITY
IN THE LAST 12 MONTHS, WAS THERE A TIME WHEN YOU DID NOT HAVE A STEADY PLACE TO SLEEP OR SLEPT IN A SHELTER (INCLUDING NOW)?: NO

## 2023-05-10 SDOH — ECONOMIC STABILITY: INCOME INSECURITY: HOW HARD IS IT FOR YOU TO PAY FOR THE VERY BASICS LIKE FOOD, HOUSING, MEDICAL CARE, AND HEATING?: NOT HARD AT ALL

## 2023-05-10 ASSESSMENT — ENCOUNTER SYMPTOMS
RESPIRATORY NEGATIVE: 1
VOMITING: 0
BLOOD IN STOOL: 0
CONSTIPATION: 0
NAUSEA: 0
SHORTNESS OF BREATH: 0
EYES NEGATIVE: 1
DIARRHEA: 0
ABDOMINAL PAIN: 0

## 2023-05-10 ASSESSMENT — PATIENT HEALTH QUESTIONNAIRE - PHQ9
SUM OF ALL RESPONSES TO PHQ9 QUESTIONS 1 & 2: 0
SUM OF ALL RESPONSES TO PHQ QUESTIONS 1-9: 0
SUM OF ALL RESPONSES TO PHQ QUESTIONS 1-9: 0
2. FEELING DOWN, DEPRESSED OR HOPELESS: 0
1. LITTLE INTEREST OR PLEASURE IN DOING THINGS: 0
SUM OF ALL RESPONSES TO PHQ QUESTIONS 1-9: 0
SUM OF ALL RESPONSES TO PHQ QUESTIONS 1-9: 0

## 2023-05-10 NOTE — PROGRESS NOTES
Jayla Gallagher (:  1960) is a Established patient, presenting virtually for evaluation of the following:  Diabetes  Hypertension      Assessment & Plan   Below is the assessment and plan developed based on review of pertinent history, physical exam, labs, studies, and medications. No follow-ups on file. Subjective   HPI  Review of Systems       Objective   Patient-Reported Vitals  No data recorded       Physical Exam             Jayla Gallagher, was evaluated through a synchronous (real-time) audio-video encounter. The patient (or guardian if applicable) is aware that this is a billable service, which includes applicable co-pays. This Virtual Visit was conducted with patient's (and/or legal guardian's) consent. Patient identification was verified, and a caregiver was present when appropriate.    The patient was located at Home: [address] [place] [id number]  Provider was located at [facility] ([address] [place], [place] [address]t): [address] [place] [address],  [address] [place] [address]-Morris Lemos LPN
WITH A MEAL Yes Historical Provider, MD   fluticasone (FLONASE) 50 MCG/ACT nasal spray Flonase Allergy Relief 50 mcg/actuation nasal spray,suspension   Spray 1 spray every day by intranasal route.  Yes Historical Provider, MD   lisinopril (PRINIVIL;ZESTRIL) 10 MG tablet Take 1 tablet by mouth daily Yes Historical Provider, MD   lovastatin (MEVACOR) 40 MG tablet Take 1 tablet by mouth nightly Yes Historical Provider, MD   metFORMIN (GLUCOPHAGE) 1000 MG tablet TAKE ONE TABLET BY MOUTH TWICE A DAY WITH A MEAL Yes Historical Provider, MD   glipiZIDE (GLUCOTROL) 5 MG tablet Take 1.5 tablets by mouth 2 times daily Yes Madeleine Wade PA-C       Social History     Tobacco Use    Smoking status: Former     Packs/day: 1.00     Types: Cigarettes     Quit date: 1995     Years since quittin.3    Smokeless tobacco: Never   Substance Use Topics    Alcohol use: No    Drug use: No        No Known Allergies,   Past Medical History:   Diagnosis Date    Diabetes ([facility]     HTN (hypertension)     Hypercholesteremia    ,   Past Surgical History:   Procedure Laterality Date     SECTION     ,   Social History     Tobacco Use    Smoking status: Former     Packs/day: 1.00     Types: Cigarettes     Quit date: 1995     Years since quittin.3    Smokeless tobacco: Never   Substance Use Topics    Alcohol use: No    Drug use: No   ,   Family History   Problem Relation Age of Onset    Heart Disease Father     Hypertension Mother     Dementia Father     Parkinson's Disease Father     Hypertension Father     Bipolar Disorder Other         daughter    Cancer Father         lung   ,   Immunization History   Administered Date(s) Administered    COVID-19, J&J, (age 18y+), IM, 0.5 mL 2021    Influenza, FLUARIX, FLULAVAL, FLUZONE (age 10 mo+) AND AFLURIA, (age 1 y+), PF, 0.5mL 2019, 2019    Influenza, FLUCELVAX, (age 10 mo+), MDCK, PF, 0.5mL 2020   ,   Health Maintenance   Topic Date Due    Pneumococcal

## 2023-05-20 DIAGNOSIS — I10 PRIMARY HYPERTENSION: ICD-10-CM

## 2023-05-22 RX ORDER — CARVEDILOL 3.12 MG/1
TABLET ORAL
Qty: 180 TABLET | Refills: 0 | Status: SHIPPED | OUTPATIENT
Start: 2023-05-22

## 2023-05-22 NOTE — TELEPHONE ENCOUNTER
PCP: Jacob King PA-C     Last appt:  5/10/2023      Future Appointments   Date Time Provider Ade Dacosta   8/14/2023  1:30 PM Jacob King PA-C [facility] AMB          Requested Prescriptions     Pending Prescriptions Disp Refills    carvedilol (COREG) 3.125 MG tablet [Pharmacy Med Name: CARVEDILOL 3.125 MG TAB[*]] 180 tablet 0     Sig: TAKE ONE TABLET BY MOUTH TWICE A DAY WITH A MEAL

## 2023-08-05 DIAGNOSIS — E11.29 TYPE 2 DIABETES MELLITUS WITH MICROALBUMINURIA, WITHOUT LONG-TERM CURRENT USE OF INSULIN (HCC): ICD-10-CM

## 2023-08-05 DIAGNOSIS — R80.9 TYPE 2 DIABETES MELLITUS WITH MICROALBUMINURIA, WITHOUT LONG-TERM CURRENT USE OF INSULIN (HCC): ICD-10-CM

## 2023-08-14 ENCOUNTER — OFFICE VISIT (OUTPATIENT)
Facility: CLINIC | Age: 63
End: 2023-08-14
Payer: COMMERCIAL

## 2023-08-14 VITALS
WEIGHT: 293 LBS | DIASTOLIC BLOOD PRESSURE: 80 MMHG | HEIGHT: 68 IN | TEMPERATURE: 98.2 F | SYSTOLIC BLOOD PRESSURE: 160 MMHG | OXYGEN SATURATION: 94 % | HEART RATE: 87 BPM | BODY MASS INDEX: 44.41 KG/M2 | RESPIRATION RATE: 16 BRPM

## 2023-08-14 DIAGNOSIS — R80.9 TYPE 2 DIABETES MELLITUS WITH MICROALBUMINURIA, WITHOUT LONG-TERM CURRENT USE OF INSULIN (HCC): ICD-10-CM

## 2023-08-14 DIAGNOSIS — I10 PRIMARY HYPERTENSION: ICD-10-CM

## 2023-08-14 DIAGNOSIS — Z00.00 PHYSICAL EXAM: Primary | ICD-10-CM

## 2023-08-14 DIAGNOSIS — E11.29 TYPE 2 DIABETES MELLITUS WITH MICROALBUMINURIA, WITHOUT LONG-TERM CURRENT USE OF INSULIN (HCC): ICD-10-CM

## 2023-08-14 DIAGNOSIS — E66.01 OBESITY, MORBID (HCC): ICD-10-CM

## 2023-08-14 DIAGNOSIS — E78.00 HYPERCHOLESTEREMIA: ICD-10-CM

## 2023-08-14 LAB — HBA1C MFR BLD: 7.6 %

## 2023-08-14 PROCEDURE — 99214 OFFICE O/P EST MOD 30 MIN: CPT | Performed by: PHYSICIAN ASSISTANT

## 2023-08-14 PROCEDURE — 3079F DIAST BP 80-89 MM HG: CPT | Performed by: PHYSICIAN ASSISTANT

## 2023-08-14 PROCEDURE — 3077F SYST BP >= 140 MM HG: CPT | Performed by: PHYSICIAN ASSISTANT

## 2023-08-14 PROCEDURE — 83036 HEMOGLOBIN GLYCOSYLATED A1C: CPT | Performed by: PHYSICIAN ASSISTANT

## 2023-08-14 PROCEDURE — 99396 PREV VISIT EST AGE 40-64: CPT | Performed by: PHYSICIAN ASSISTANT

## 2023-08-14 RX ORDER — LISINOPRIL 20 MG/1
20 TABLET ORAL DAILY
Qty: 30 TABLET | Refills: 5 | Status: SHIPPED | OUTPATIENT
Start: 2023-08-14

## 2023-08-14 RX ORDER — GLIPIZIDE 10 MG/1
10 TABLET ORAL 2 TIMES DAILY
Qty: 60 TABLET | Refills: 3 | Status: SHIPPED | OUTPATIENT
Start: 2023-08-14

## 2023-08-14 ASSESSMENT — PATIENT HEALTH QUESTIONNAIRE - PHQ9
SUM OF ALL RESPONSES TO PHQ QUESTIONS 1-9: 18
1. LITTLE INTEREST OR PLEASURE IN DOING THINGS: 0
SUM OF ALL RESPONSES TO PHQ QUESTIONS 1-9: 18
8. MOVING OR SPEAKING SO SLOWLY THAT OTHER PEOPLE COULD HAVE NOTICED. OR THE OPPOSITE, BEING SO FIGETY OR RESTLESS THAT YOU HAVE BEEN MOVING AROUND A LOT MORE THAN USUAL: 0
6. FEELING BAD ABOUT YOURSELF - OR THAT YOU ARE A FAILURE OR HAVE LET YOURSELF OR YOUR FAMILY DOWN: 3
4. FEELING TIRED OR HAVING LITTLE ENERGY: 3
10. IF YOU CHECKED OFF ANY PROBLEMS, HOW DIFFICULT HAVE THESE PROBLEMS MADE IT FOR YOU TO DO YOUR WORK, TAKE CARE OF THINGS AT HOME, OR GET ALONG WITH OTHER PEOPLE: 0
9. THOUGHTS THAT YOU WOULD BE BETTER OFF DEAD, OR OF HURTING YOURSELF: 0
SUM OF ALL RESPONSES TO PHQ QUESTIONS 1-9: 18
3. TROUBLE FALLING OR STAYING ASLEEP: 3
SUM OF ALL RESPONSES TO PHQ9 QUESTIONS 1 & 2: 3
2. FEELING DOWN, DEPRESSED OR HOPELESS: 3
7. TROUBLE CONCENTRATING ON THINGS, SUCH AS READING THE NEWSPAPER OR WATCHING TELEVISION: 3
SUM OF ALL RESPONSES TO PHQ QUESTIONS 1-9: 18
5. POOR APPETITE OR OVEREATING: 3

## 2023-08-14 ASSESSMENT — ENCOUNTER SYMPTOMS
EYES NEGATIVE: 1
NAUSEA: 0
ABDOMINAL PAIN: 0
SHORTNESS OF BREATH: 0
DIARRHEA: 0
CONSTIPATION: 0
VOMITING: 0
BLOOD IN STOOL: 0
RESPIRATORY NEGATIVE: 1

## 2023-08-18 DIAGNOSIS — I10 PRIMARY HYPERTENSION: ICD-10-CM

## 2023-08-18 RX ORDER — CARVEDILOL 3.12 MG/1
TABLET ORAL
Qty: 180 TABLET | Refills: 1 | Status: SHIPPED | OUTPATIENT
Start: 2023-08-18

## 2023-08-18 NOTE — TELEPHONE ENCOUNTER
PCP: Jesus Pastor PA-C     Last appt:  8/14/2023      Future Appointments   Date Time Provider [facility] [address]   9/25/2023  3:00 PM Jesus Pastor PA-C [facility] AMB          Requested Prescriptions     Pending Prescriptions Disp Refills    carvedilol (COREG) 3.125 MG tablet [Pharmacy Med Name: CARVEDILOL 3.125 MG TAB[*]] 180 tablet 0     Sig: TAKE ONE TABLET BY MOUTH TWICE A DAY WITH A MEAL

## 2023-09-11 RX ORDER — LOVASTATIN 20 MG/1
20 TABLET ORAL
Qty: 30 TABLET | Refills: 5 | Status: SHIPPED | OUTPATIENT
Start: 2023-09-11

## 2023-09-11 NOTE — TELEPHONE ENCOUNTER
PCP: Fifi Graf PA-C     Last appt:  8/14/2023      Future Appointments   Date Time Provider [address]   9/25/2023  3:00 PM Fifi Graf PA-C [facility] AMB          Requested Prescriptions     Pending Prescriptions Disp Refills    lovastatin (MEVACOR) 20 MG tablet [Pharmacy Med Name: LOVASTATIN 20 MG TAB[*]] 30 tablet 5     Sig: TAKE ONE TABLET BY MOUTH EVERY NIGHT

## 2023-09-25 ENCOUNTER — OFFICE VISIT (OUTPATIENT)
Facility: CLINIC | Age: 63
End: 2023-09-25
Payer: COMMERCIAL

## 2023-09-25 VITALS
OXYGEN SATURATION: 97 % | BODY MASS INDEX: 50.33 KG/M2 | DIASTOLIC BLOOD PRESSURE: 102 MMHG | HEIGHT: 68 IN | RESPIRATION RATE: 16 BRPM | SYSTOLIC BLOOD PRESSURE: 156 MMHG | TEMPERATURE: 98.1 F | HEART RATE: 80 BPM

## 2023-09-25 DIAGNOSIS — Z23 NEEDS FLU SHOT: ICD-10-CM

## 2023-09-25 DIAGNOSIS — R80.9 TYPE 2 DIABETES MELLITUS WITH MICROALBUMINURIA, WITHOUT LONG-TERM CURRENT USE OF INSULIN (HCC): Primary | ICD-10-CM

## 2023-09-25 DIAGNOSIS — I10 PRIMARY HYPERTENSION: ICD-10-CM

## 2023-09-25 DIAGNOSIS — E66.01 OBESITY, MORBID (HCC): ICD-10-CM

## 2023-09-25 DIAGNOSIS — E11.29 TYPE 2 DIABETES MELLITUS WITH MICROALBUMINURIA, WITHOUT LONG-TERM CURRENT USE OF INSULIN (HCC): Primary | ICD-10-CM

## 2023-09-25 DIAGNOSIS — R80.9 TYPE 2 DIABETES MELLITUS WITH MICROALBUMINURIA, WITHOUT LONG-TERM CURRENT USE OF INSULIN (HCC): ICD-10-CM

## 2023-09-25 DIAGNOSIS — E78.00 HYPERCHOLESTEREMIA: ICD-10-CM

## 2023-09-25 DIAGNOSIS — Z23 IMMUNIZATION DUE: ICD-10-CM

## 2023-09-25 DIAGNOSIS — E11.29 TYPE 2 DIABETES MELLITUS WITH MICROALBUMINURIA, WITHOUT LONG-TERM CURRENT USE OF INSULIN (HCC): ICD-10-CM

## 2023-09-25 PROCEDURE — 90471 IMMUNIZATION ADMIN: CPT | Performed by: PHYSICIAN ASSISTANT

## 2023-09-25 PROCEDURE — 3051F HG A1C>EQUAL 7.0%<8.0%: CPT | Performed by: PHYSICIAN ASSISTANT

## 2023-09-25 PROCEDURE — 3077F SYST BP >= 140 MM HG: CPT | Performed by: PHYSICIAN ASSISTANT

## 2023-09-25 PROCEDURE — 3079F DIAST BP 80-89 MM HG: CPT | Performed by: PHYSICIAN ASSISTANT

## 2023-09-25 PROCEDURE — 99214 OFFICE O/P EST MOD 30 MIN: CPT | Performed by: PHYSICIAN ASSISTANT

## 2023-09-25 PROCEDURE — 90674 CCIIV4 VAC NO PRSV 0.5 ML IM: CPT | Performed by: PHYSICIAN ASSISTANT

## 2023-09-25 RX ORDER — LISINOPRIL AND HYDROCHLOROTHIAZIDE 20; 12.5 MG/1; MG/1
1 TABLET ORAL DAILY
Qty: 30 TABLET | Refills: 5 | Status: SHIPPED | OUTPATIENT
Start: 2023-09-25

## 2023-09-25 ASSESSMENT — ENCOUNTER SYMPTOMS
ABDOMINAL PAIN: 0
SHORTNESS OF BREATH: 0
DIARRHEA: 0
RESPIRATORY NEGATIVE: 1
VOMITING: 0
NAUSEA: 0
BLOOD IN STOOL: 0
EYES NEGATIVE: 1
CONSTIPATION: 0

## 2023-09-25 NOTE — PATIENT INSTRUCTIONS

## 2023-09-25 NOTE — PROGRESS NOTES
Max Espino is a 59y.o. year old female seen in clinic today for   Chief Complaint   Patient presents with    Diabetes     Room 4B //     Hypertension       she is here today to follow up for HTN and diabetes. Pt checking sugars and Bps at home. Compliant with meds, not reporting any side effects. Pt is doing okay mental healt wise. States she has more good days than bad days. Aware of her options, such as talk therapy/meds. Pt states she is leaning towards talk therapy. Home BP readings averaging 140/90. Compliant with carvedilol and increased lisinopril 20 mg    Home sugar readings averaging 140 fasting. Compliant with metformin and glipizide    she specifically denies any CP, SOB, HA. Dizziness, fevers, chills, N/V/D, urinary symptoms or other bowel changes. Current Outpatient Medications on File Prior to Visit   Medication Sig Dispense Refill    lovastatin (MEVACOR) 20 MG tablet TAKE ONE TABLET BY MOUTH EVERY NIGHT 30 tablet 5    carvedilol (COREG) 3.125 MG tablet TAKE ONE TABLET BY MOUTH TWICE A DAY WITH A MEAL 180 tablet 1    lisinopril (PRINIVIL;ZESTRIL) 20 MG tablet Take 1 tablet by mouth daily 30 tablet 5    glipiZIDE (GLUCOTROL) 10 MG tablet Take 1 tablet by mouth 2 times daily 60 tablet 3    metFORMIN (GLUCOPHAGE) 1000 MG tablet TAKE ONE TABLET BY MOUTH TWICE A DAY WITH A MEAL 180 tablet 3    fluticasone (FLONASE) 50 MCG/ACT nasal spray Flonase Allergy Relief 50 mcg/actuation nasal spray,suspension   Spray 1 spray every day by intranasal route. No current facility-administered medications on file prior to visit.          No Known Allergies  Past Medical History:   Diagnosis Date    Diabetes ([address])     HTN (hypertension)     Hypercholesteremia     Type 2 diabetes mellitus without complication ([address])       Past Surgical History:   Procedure Laterality Date     SECTION          Family History   Problem Relation Age of Onset    Heart Disease Father     Dementia Father     Parkinson's

## 2023-10-11 RX ORDER — LOVASTATIN 40 MG/1
40 TABLET ORAL
Qty: 30 TABLET | Refills: 5 | Status: SHIPPED | OUTPATIENT
Start: 2023-10-11

## 2023-10-11 NOTE — TELEPHONE ENCOUNTER
PCP: Michelle Hu PA-C     Last appt:  9/25/2023      Future Appointments   Date Time Provider [facility] [address]   1/8/2024  1:30 PM Michelle Hu PA-C [facility] AMB          Requested Prescriptions     Pending Prescriptions Disp Refills    lovastatin (MEVACOR) 40 MG tablet [Pharmacy Med Name: LOVASTATIN 40 MG TAB[*]] 30 tablet 5     Sig: TAKE ONE TABLET BY MOUTH EVERY NIGHT

## 2023-10-29 NOTE — PROGRESS NOTES
Belgica Cintron  Identified pt with two pt identifiers(name and ). Chief Complaint   Patient presents with    Diabetes     Room 4A //     Hypertension    Weight Management       Reviewed record In preparation for visit and have obtained necessary documentation. 1. Have you been to the ER, urgent care clinic or hospitalized since your last visit? No     2. Have you seen or consulted any other health care providers outside of the [address] [place] [address] since your last visit? Include any pap smears or colon screening. No    Patient declined receiving information on advance directives. Vitals reviewed with provider.     Health Maintenance reviewed:     Health Maintenance Due   Topic    Breast Cancer Screen Mammogram     FOBT Q1Y Age 54-65           Wt Readings from Last 3 Encounters:   20 302 lb 6.4 oz (137.2 kg)   19 305 lb (138.3 kg)   19 322 lb 9.6 oz (146.3 kg)        Temp Readings from Last 3 Encounters:   20 98.6 °F (37 °C) (Oral)   19 98 °F (36.7 °C) (Oral)   19 98.2 °F (36.8 °C) (Oral)        BP Readings from Last 3 Encounters:   20 159/82   19 125/74   19 124/81        Pulse Readings from Last 3 Encounters:   20 (!) 101   19 89   19 98        Vitals:    20 0944   BP: 159/82   Pulse: (!) 101   Resp: 16   Temp: 98.6 °F (37 °C)   TempSrc: Oral   SpO2: 96%   Weight: 302 lb 6.4 oz (137.2 kg)   Height: 5' 8\" (1.727 m)   PainSc:   0 - No pain          Learning Assessment:   :       Learning Assessment 2019   PRIMARY LEARNER Patient   BARRIERS PRIMARY LEARNER NONE   CO-LEARNER CAREGIVER No   PRIMARY LANGUAGE ENGLISH   LEARNER PREFERENCE PRIMARY READING   ANSWERED BY self   RELATIONSHIP SELF        Depression Screening:   :       3 most recent PHQ Screens 2020   Little interest or pleasure in doing things Not at all   Feeling down, depressed, irritable, or hopeless Not at all   Total Score PHQ 2 0        Fall Risk Assessment:   :     No flowsheet data found. Abuse Screening:   :     No flowsheet data found.      ADL Screening:   :       ADL Assessment 11/14/2019   Feeding yourself No Help Needed   Getting from bed to chair No Help Needed   Getting dressed No Help Needed   Bathing or showering No Help Needed   Walk across the room (includes cane/walker) No Help Needed   Using the telphone No Help Needed   Taking your medications No Help Needed   Preparing meals No Help Needed   Managing money (expenses/bills) No Help Needed   Moderately strenuous housework (laundry) No Help Needed   Shopping for personal items (toiletries/medicines) No Help Needed   Shopping for groceries No Help Needed   Driving No Help Needed   Climbing a flight of stairs No Help Needed   Getting to places beyond walking distances No Help Needed confused

## 2023-12-10 DIAGNOSIS — E11.29 TYPE 2 DIABETES MELLITUS WITH MICROALBUMINURIA, WITHOUT LONG-TERM CURRENT USE OF INSULIN (HCC): ICD-10-CM

## 2023-12-10 DIAGNOSIS — R80.9 TYPE 2 DIABETES MELLITUS WITH MICROALBUMINURIA, WITHOUT LONG-TERM CURRENT USE OF INSULIN (HCC): ICD-10-CM

## 2023-12-10 RX ORDER — GLIPIZIDE 10 MG/1
10 TABLET ORAL 2 TIMES DAILY
Qty: 60 TABLET | Refills: 3 | Status: SHIPPED | OUTPATIENT
Start: 2023-12-10

## 2024-01-04 RX ORDER — LISINOPRIL AND HYDROCHLOROTHIAZIDE 20; 12.5 MG/1; MG/1
1 TABLET ORAL DAILY
Qty: 30 TABLET | Refills: 5 | Status: SHIPPED | OUTPATIENT
Start: 2024-01-04

## 2024-01-04 NOTE — TELEPHONE ENCOUNTER
PCP: Reagan Davison PA-C     Last appt:  9/25/2023      Future Appointments   Date Time Provider Department Center   1/29/2024  1:30 PM Reagan Davison PA-C [facility] AMB          Requested Prescriptions     Pending Prescriptions Disp Refills    lisinopril-hydroCHLOROthiazide (PRINZIDE;ZESTORETIC) 20-12.5 MG per tablet [Pharmacy Med Name: LISINOPRIL-HCTZ 20-12.5 MG TAB[*]] 30 tablet 5     Sig: TAKE ONE TABLET BY MOUTH ONE TIME DAILY

## 2024-01-21 LAB
ALBUMIN SERPL-MCNC: 4.4 G/DL (ref 3.9–4.9)
ALBUMIN/GLOB SERPL: 1.7 {RATIO} (ref 1.2–2.2)
ALP SERPL-CCNC: 63 IU/L (ref 44–121)
ALT SERPL-CCNC: 8 IU/L (ref 0–32)
AST SERPL-CCNC: 16 IU/L (ref 0–40)
BASOPHILS # BLD AUTO: 0 X10E3/UL (ref 0–0.2)
BASOPHILS NFR BLD AUTO: 1 %
BILIRUB SERPL-MCNC: 0.3 MG/DL (ref 0–1.2)
BUN SERPL-MCNC: 18 MG/DL (ref 8–27)
BUN/CREAT SERPL: 18 (ref 12–28)
CALCIUM SERPL-MCNC: 9.6 MG/DL (ref 8.7–10.3)
CHLORIDE SERPL-SCNC: 101 MMOL/L (ref 96–106)
CHOLEST SERPL-MCNC: 206 MG/DL (ref 100–199)
CO2 SERPL-SCNC: 23 MMOL/L (ref 20–29)
CREAT SERPL-MCNC: 1.01 MG/DL (ref 0.57–1)
EGFRCR SERPLBLD CKD-EPI 2021: 63 ML/MIN/1.73
EOSINOPHIL # BLD AUTO: 0.1 X10E3/UL (ref 0–0.4)
EOSINOPHIL NFR BLD AUTO: 2 %
ERYTHROCYTE [DISTWIDTH] IN BLOOD BY AUTOMATED COUNT: 12.8 % (ref 11.7–15.4)
GLOBULIN SER CALC-MCNC: 2.6 G/DL (ref 1.5–4.5)
GLUCOSE SERPL-MCNC: 170 MG/DL (ref 70–99)
HCT VFR BLD AUTO: 39.1 % (ref 34–46.6)
HDLC SERPL-MCNC: 35 MG/DL
HGB BLD-MCNC: 13.1 G/DL (ref 11.1–15.9)
IMM GRANULOCYTES # BLD AUTO: 0 X10E3/UL (ref 0–0.1)
IMM GRANULOCYTES NFR BLD AUTO: 0 %
LDLC SERPL CALC-MCNC: 126 MG/DL (ref 0–99)
LYMPHOCYTES # BLD AUTO: 1.9 X10E3/UL (ref 0.7–3.1)
LYMPHOCYTES NFR BLD AUTO: 38 %
MCH RBC QN AUTO: 28.7 PG (ref 26.6–33)
MCHC RBC AUTO-ENTMCNC: 33.5 G/DL (ref 31.5–35.7)
MCV RBC AUTO: 86 FL (ref 79–97)
MONOCYTES # BLD AUTO: 0.3 X10E3/UL (ref 0.1–0.9)
MONOCYTES NFR BLD AUTO: 6 %
NEUTROPHILS # BLD AUTO: 2.6 X10E3/UL (ref 1.4–7)
NEUTROPHILS NFR BLD AUTO: 53 %
PLATELET # BLD AUTO: 214 X10E3/UL (ref 150–450)
POTASSIUM SERPL-SCNC: 5.1 MMOL/L (ref 3.5–5.2)
PROT SERPL-MCNC: 7 G/DL (ref 6–8.5)
RBC # BLD AUTO: 4.56 X10E6/UL (ref 3.77–5.28)
SODIUM SERPL-SCNC: 137 MMOL/L (ref 134–144)
TRIGL SERPL-MCNC: 252 MG/DL (ref 0–149)
VLDLC SERPL CALC-MCNC: 45 MG/DL (ref 5–40)
WBC # BLD AUTO: 4.9 X10E3/UL (ref 3.4–10.8)

## 2024-01-22 LAB — HBA1C MFR BLD: 8.2 % (ref 4.8–5.6)

## 2024-01-29 ENCOUNTER — OFFICE VISIT (OUTPATIENT)
Facility: CLINIC | Age: 64
End: 2024-01-29
Payer: COMMERCIAL

## 2024-01-29 ENCOUNTER — PATIENT MESSAGE (OUTPATIENT)
Facility: CLINIC | Age: 64
End: 2024-01-29

## 2024-01-29 VITALS
DIASTOLIC BLOOD PRESSURE: 78 MMHG | HEART RATE: 98 BPM | SYSTOLIC BLOOD PRESSURE: 146 MMHG | TEMPERATURE: 98.2 F | RESPIRATION RATE: 16 BRPM | WEIGHT: 293 LBS | HEIGHT: 68 IN | OXYGEN SATURATION: 96 % | BODY MASS INDEX: 44.41 KG/M2

## 2024-01-29 DIAGNOSIS — E11.29 TYPE 2 DIABETES MELLITUS WITH MICROALBUMINURIA, WITHOUT LONG-TERM CURRENT USE OF INSULIN (HCC): ICD-10-CM

## 2024-01-29 DIAGNOSIS — I10 PRIMARY HYPERTENSION: ICD-10-CM

## 2024-01-29 DIAGNOSIS — E78.00 HYPERCHOLESTEREMIA: ICD-10-CM

## 2024-01-29 DIAGNOSIS — Z00.00 PHYSICAL EXAM: Primary | ICD-10-CM

## 2024-01-29 DIAGNOSIS — Z12.11 COLON CANCER SCREENING: ICD-10-CM

## 2024-01-29 DIAGNOSIS — E66.01 OBESITY, MORBID (HCC): ICD-10-CM

## 2024-01-29 DIAGNOSIS — R80.9 TYPE 2 DIABETES MELLITUS WITH MICROALBUMINURIA, WITHOUT LONG-TERM CURRENT USE OF INSULIN (HCC): ICD-10-CM

## 2024-01-29 PROCEDURE — 3078F DIAST BP <80 MM HG: CPT | Performed by: PHYSICIAN ASSISTANT

## 2024-01-29 PROCEDURE — 3077F SYST BP >= 140 MM HG: CPT | Performed by: PHYSICIAN ASSISTANT

## 2024-01-29 PROCEDURE — 99396 PREV VISIT EST AGE 40-64: CPT | Performed by: PHYSICIAN ASSISTANT

## 2024-01-29 RX ORDER — ACYCLOVIR 400 MG/1
TABLET ORAL
Qty: 6 EACH | Refills: 3 | Status: SHIPPED | OUTPATIENT
Start: 2024-01-29

## 2024-01-29 RX ORDER — TIRZEPATIDE 2.5 MG/.5ML
2.5 INJECTION, SOLUTION SUBCUTANEOUS WEEKLY
Qty: 2 ML | Refills: 2 | Status: SHIPPED | OUTPATIENT
Start: 2024-01-29

## 2024-01-29 RX ORDER — ACYCLOVIR 400 MG/1
TABLET ORAL
Qty: 1 EACH | Refills: 0 | Status: SHIPPED | OUTPATIENT
Start: 2024-01-29

## 2024-01-29 ASSESSMENT — PATIENT HEALTH QUESTIONNAIRE - PHQ9
2. FEELING DOWN, DEPRESSED OR HOPELESS: 0
SUM OF ALL RESPONSES TO PHQ QUESTIONS 1-9: 0
8. MOVING OR SPEAKING SO SLOWLY THAT OTHER PEOPLE COULD HAVE NOTICED. OR THE OPPOSITE, BEING SO FIGETY OR RESTLESS THAT YOU HAVE BEEN MOVING AROUND A LOT MORE THAN USUAL: 0
7. TROUBLE CONCENTRATING ON THINGS, SUCH AS READING THE NEWSPAPER OR WATCHING TELEVISION: 0
SUM OF ALL RESPONSES TO PHQ QUESTIONS 1-9: 0
5. POOR APPETITE OR OVEREATING: 0
SUM OF ALL RESPONSES TO PHQ QUESTIONS 1-9: 0
9. THOUGHTS THAT YOU WOULD BE BETTER OFF DEAD, OR OF HURTING YOURSELF: 0
SUM OF ALL RESPONSES TO PHQ9 QUESTIONS 1 & 2: 0
1. LITTLE INTEREST OR PLEASURE IN DOING THINGS: 0
SUM OF ALL RESPONSES TO PHQ QUESTIONS 1-9: 0
4. FEELING TIRED OR HAVING LITTLE ENERGY: 0
10. IF YOU CHECKED OFF ANY PROBLEMS, HOW DIFFICULT HAVE THESE PROBLEMS MADE IT FOR YOU TO DO YOUR WORK, TAKE CARE OF THINGS AT HOME, OR GET ALONG WITH OTHER PEOPLE: 0
6. FEELING BAD ABOUT YOURSELF - OR THAT YOU ARE A FAILURE OR HAVE LET YOURSELF OR YOUR FAMILY DOWN: 0
3. TROUBLE FALLING OR STAYING ASLEEP: 0

## 2024-01-29 ASSESSMENT — ENCOUNTER SYMPTOMS
CONSTIPATION: 0
BLOOD IN STOOL: 0
DIARRHEA: 0
VOMITING: 0
EYES NEGATIVE: 1
NAUSEA: 0
ABDOMINAL PAIN: 0
SHORTNESS OF BREATH: 0
RESPIRATORY NEGATIVE: 1

## 2024-01-29 NOTE — TELEPHONE ENCOUNTER
PCP: Reagan Davison PA-C     Last appt:  1/29/2024      Future Appointments   Date Time Provider Department Center   4/29/2024  1:30 PM Reagan Davison PA-C [facility] AMB          Requested Prescriptions     Pending Prescriptions Disp Refills    Continuous Blood Gluc Sensor (DEXCOM G7 SENSOR) MISC 6 each 3     Sig: Use to monitor blood sugars.    Continuous Blood Gluc  (DEXCOM G7 ) SEKOU 1 each 0     Sig: Use to monitor blood sugars.

## 2024-01-29 NOTE — PROGRESS NOTES
Kimberlee Hartley  Identified pt with two pt identifiers(name and ).     Chief Complaint   Patient presents with    Annual Exam     Room 4B //     Diabetes    Cholesterol Problem    Hypertension       Reviewed record In preparation for visit and have obtained necessary documentation.     1. Have you been to the ER, urgent care clinic or hospitalized since your last visit? No     2. Have you seen or consulted any other health care providers outside of the [facility] since your last visit? Include any pap smears or colon screening. Yes. Eye Exam - [facility]     Patient has an advance directive.     Vitals reviewed with provider.    Health Maintenance reviewed:     Health Maintenance Due   Topic    Pneumococcal 0-64 years Vaccine (1 - PCV)    HIV screen     Diabetic retinal exam     Respiratory Syncytial Virus (RSV) Pregnant or age 60 yrs+ (1 - 1-dose 60+ series)    COVID-19 Vaccine ( -  season)          Wt Readings from Last 3 Encounters:   23 (!) 150.1 kg (331 lb)   22 (!) 143.8 kg (317 lb)   21 (!) 150.6 kg (332 lb)        Temp Readings from Last 3 Encounters:   23 98.1 °F (36.7 °C) (Oral)   23 98.2 °F (36.8 °C) (Oral)        BP Readings from Last 3 Encounters:   23 (!) 156/102   23 (!) 160/80   22 132/89        Pulse Readings from Last 3 Encounters:   23 80   23 87   22 (!) 102             No data to display                  Learning Assessment:   :         5/10/2023     4:00 PM   [facility] AMB LEARNING ASSESSMENT   Primary Learner Patient   level of education GRADUATED HIGH SCHOOL OR GED   Primary Language ENGLISH   Learning Preference READING   Answered By self   Relationship to Learner SELF         Fall Risk Assessment:          No data to display                  Abuse Screenin/14/2023     1:00 PM   AMB Abuse Screening   Do you ever feel afraid of your partner? N   Are you in a relationship with someone who 
DNA Colorectal Cancer Screening)    Type 2 diabetes mellitus with hyperglycemia (HCC)  -     [facility]    Other orders  -     Tirzepatide (MOUNJARO) 2.5 MG/0.5ML SOPN SC injection; Inject 0.5 mLs into the skin once a week    In response to increased A1c and lipid panel starting on mounjaro. Hopeful it will improve weight, HLD and A1c, titrate up as tolerated . Given date of last coloscopy will order cologuard at this time. Continue all other medical management       I have discussed the diagnosis with the patient and the intended plan as seen in the above orders. Patient is in agreement.  The patient has received an after-visit summary and questions were answered concerning future plans.  I have discussed medication side effects and warnings with the patient as well.    Reagan Davison PA-C

## 2024-01-29 NOTE — TELEPHONE ENCOUNTER
From: Kimberlee Hartley  To: Reagan Davison  Sent: 1/29/2024 3:27 PM EST  Subject: Dexcom & Moniaro    Hi Dr Davison, my insurance says the Dexcom can be sent to [facility]   The [facility] they said you need to fill out the prior authorization at [email or web address] before it can be filled  Thank you for all your help today!!  Kimberlee

## 2024-01-29 NOTE — PATIENT INSTRUCTIONS
CALL INSURANCE ABOUT DEXCOM    Need to know if should be sent to DME pharmacy, retail or mail order

## 2024-02-03 DIAGNOSIS — I10 PRIMARY HYPERTENSION: ICD-10-CM

## 2024-02-05 RX ORDER — CARVEDILOL 3.12 MG/1
TABLET ORAL
Qty: 180 TABLET | Refills: 1 | Status: SHIPPED | OUTPATIENT
Start: 2024-02-05

## 2024-02-05 NOTE — TELEPHONE ENCOUNTER
PCP: Reagan Davison PA-C     Last appt:  1/29/2024      Future Appointments   Date Time Provider Department Center   2/7/2024  1:00 PM Romelia Marie RN [facility] BS AMB   4/29/2024  1:30 PM Reagan Davison PA-C [facility] BS AMB          Requested Prescriptions     Pending Prescriptions Disp Refills    carvedilol (COREG) 3.125 MG tablet [Pharmacy Med Name: CARVEDILOL 3.125 MG TAB[*]] 180 tablet 1     Sig: TAKE ONE TABLET BY MOUTH TWICE A DAY WITH A MEAL

## 2024-03-04 RX ORDER — LISINOPRIL 10 MG/1
10 TABLET ORAL DAILY
Qty: 90 TABLET | Refills: 3 | Status: SHIPPED | OUTPATIENT
Start: 2024-03-04

## 2024-03-06 LAB — NONINV COLON CA DNA+OCC BLD SCRN STL QL: NEGATIVE

## 2024-03-14 ENCOUNTER — PATIENT MESSAGE (OUTPATIENT)
Facility: CLINIC | Age: 64
End: 2024-03-14

## 2024-03-14 DIAGNOSIS — E11.29 TYPE 2 DIABETES MELLITUS WITH MICROALBUMINURIA, WITHOUT LONG-TERM CURRENT USE OF INSULIN (HCC): Primary | ICD-10-CM

## 2024-03-14 DIAGNOSIS — R80.9 TYPE 2 DIABETES MELLITUS WITH MICROALBUMINURIA, WITHOUT LONG-TERM CURRENT USE OF INSULIN (HCC): Primary | ICD-10-CM

## 2024-03-14 NOTE — TELEPHONE ENCOUNTER
From: Kimberlee Hartley  To: Reagan Davison  Sent: 3/14/2024 8:05 AM EDT  Subject: Mounjaro prescription    Good Morning Dr. Davison,  I was finally able to  the Mounjaro yesterday (inventory problems, computer hack). I paid $500.00 for it. The pharmacist contacted my insurance company. They told her if you would contact them and get pre-approval I would see some savings on the price, for the refill.  I am going to re-schedule the diabetes program appointment for a day off I have next month.    Thank you for all your help!! Kimberlee Hartley

## 2024-04-01 RX ORDER — LOVASTATIN 40 MG/1
40 TABLET ORAL
Qty: 30 TABLET | Refills: 5 | Status: SHIPPED | OUTPATIENT
Start: 2024-04-01

## 2024-04-03 ENCOUNTER — TELEPHONE (OUTPATIENT)
Facility: CLINIC | Age: 64
End: 2024-04-03

## 2024-04-03 NOTE — TELEPHONE ENCOUNTER
I called and verified the patient by name and date of birth, then proceeded with the message from the provider. The patient understood and had no questions at this time.

## 2024-04-03 NOTE — TELEPHONE ENCOUNTER
I called the patient and verified them by name and date of birth. Has had two dosages of Mounjaro on Sunday. Blood sugars normally drop once that night and she will need to take a glucose tab to bring it back up. Recently it dropped and she had to take 4 glucose tabs last night to bring it back up. Wondering if the the Glipizide is too much? Most of the time the Dexcom alarms her when she is sleeping, last night she did feel woozy.     Last Night: 62

## 2024-05-01 DIAGNOSIS — R80.9 TYPE 2 DIABETES MELLITUS WITH MICROALBUMINURIA, WITHOUT LONG-TERM CURRENT USE OF INSULIN (HCC): ICD-10-CM

## 2024-05-01 DIAGNOSIS — E11.29 TYPE 2 DIABETES MELLITUS WITH MICROALBUMINURIA, WITHOUT LONG-TERM CURRENT USE OF INSULIN (HCC): ICD-10-CM

## 2024-05-02 RX ORDER — GLIPIZIDE 10 MG/1
10 TABLET ORAL 2 TIMES DAILY
Qty: 60 TABLET | Refills: 3 | Status: SHIPPED | OUTPATIENT
Start: 2024-05-02

## 2024-05-02 NOTE — TELEPHONE ENCOUNTER
PCP: Reagan Davison PA-C     Last appt:  1/29/2024      Future Appointments   Date Time Provider Department Center   5/16/2024 11:00 AM Reagan Davison PA-C [facility] BS AMB          Requested Prescriptions     Pending Prescriptions Disp Refills    glipiZIDE (GLUCOTROL) 10 MG tablet [Pharmacy Med Name: GLIPIZIDE 10 MG TAB[*]] 60 tablet 3     Sig: TAKE ONE TABLET BY MOUTH TWICE A DAY

## 2024-05-15 SDOH — ECONOMIC STABILITY: INCOME INSECURITY: HOW HARD IS IT FOR YOU TO PAY FOR THE VERY BASICS LIKE FOOD, HOUSING, MEDICAL CARE, AND HEATING?: NOT HARD AT ALL

## 2024-05-15 SDOH — ECONOMIC STABILITY: FOOD INSECURITY: WITHIN THE PAST 12 MONTHS, YOU WORRIED THAT YOUR FOOD WOULD RUN OUT BEFORE YOU GOT MONEY TO BUY MORE.: NEVER TRUE

## 2024-05-15 SDOH — ECONOMIC STABILITY: FOOD INSECURITY: WITHIN THE PAST 12 MONTHS, THE FOOD YOU BOUGHT JUST DIDN'T LAST AND YOU DIDN'T HAVE MONEY TO GET MORE.: NEVER TRUE

## 2024-05-15 SDOH — ECONOMIC STABILITY: TRANSPORTATION INSECURITY
IN THE PAST 12 MONTHS, HAS LACK OF TRANSPORTATION KEPT YOU FROM MEETINGS, WORK, OR FROM GETTING THINGS NEEDED FOR DAILY LIVING?: NO

## 2024-05-16 ENCOUNTER — OFFICE VISIT (OUTPATIENT)
Facility: CLINIC | Age: 64
End: 2024-05-16
Payer: COMMERCIAL

## 2024-05-16 VITALS
SYSTOLIC BLOOD PRESSURE: 130 MMHG | RESPIRATION RATE: 16 BRPM | WEIGHT: 293 LBS | OXYGEN SATURATION: 97 % | BODY MASS INDEX: 44.41 KG/M2 | HEIGHT: 68 IN | TEMPERATURE: 98.6 F | DIASTOLIC BLOOD PRESSURE: 76 MMHG | HEART RATE: 84 BPM

## 2024-05-16 DIAGNOSIS — R80.9 TYPE 2 DIABETES MELLITUS WITH MICROALBUMINURIA, WITHOUT LONG-TERM CURRENT USE OF INSULIN (HCC): Primary | ICD-10-CM

## 2024-05-16 DIAGNOSIS — I10 PRIMARY HYPERTENSION: ICD-10-CM

## 2024-05-16 DIAGNOSIS — E11.29 TYPE 2 DIABETES MELLITUS WITH MICROALBUMINURIA, WITHOUT LONG-TERM CURRENT USE OF INSULIN (HCC): Primary | ICD-10-CM

## 2024-05-16 DIAGNOSIS — E66.01 OBESITY, MORBID (HCC): ICD-10-CM

## 2024-05-16 DIAGNOSIS — E78.00 HYPERCHOLESTEREMIA: ICD-10-CM

## 2024-05-16 LAB — HBA1C MFR BLD: 6.3 %

## 2024-05-16 PROCEDURE — 99214 OFFICE O/P EST MOD 30 MIN: CPT | Performed by: PHYSICIAN ASSISTANT

## 2024-05-16 PROCEDURE — 3052F HG A1C>EQUAL 8.0%<EQUAL 9.0%: CPT | Performed by: PHYSICIAN ASSISTANT

## 2024-05-16 PROCEDURE — 3078F DIAST BP <80 MM HG: CPT | Performed by: PHYSICIAN ASSISTANT

## 2024-05-16 PROCEDURE — 83036 HEMOGLOBIN GLYCOSYLATED A1C: CPT | Performed by: PHYSICIAN ASSISTANT

## 2024-05-16 PROCEDURE — 3075F SYST BP GE 130 - 139MM HG: CPT | Performed by: PHYSICIAN ASSISTANT

## 2024-05-16 RX ORDER — ONDANSETRON 4 MG/1
4 TABLET, FILM COATED ORAL 3 TIMES DAILY PRN
Qty: 30 TABLET | Refills: 5 | Status: SHIPPED | OUTPATIENT
Start: 2024-05-16

## 2024-05-16 ASSESSMENT — ENCOUNTER SYMPTOMS
CONSTIPATION: 1
VOMITING: 0
NAUSEA: 1
RESPIRATORY NEGATIVE: 1
ABDOMINAL PAIN: 0
EYES NEGATIVE: 1
BLOOD IN STOOL: 0

## 2024-05-16 NOTE — PROGRESS NOTES
Kimberlee Hartley is a 64 y.o. year old female seen in clinic today for   Chief Complaint   Patient presents with    Diabetes     Room 4A //        she is here today to follow up for diabetes type 2 and HTN  She had initially started last visit on Mounjaro but insurance preferred Ozempic. She has been on 0.25 mg weekly ozempic and has done well. She has been doing dieting with low carb shakes and has had some minimal meals for dinner. She had some extreme lows overnight on her Dexcom and called us and we discontinued her glipizide. And she has gone up in BG routinely but stays \"in range\" 80% of the time.    She checks her BP routinely and has been running in the 130's regularly, compliant with meds.    She does note some new nausea semi regularly with ozempic and some constipation. She takes her shot on Sunday and does not have a BM until Thursday and it is quite firm. She then has 2 days of loose stools and then has another shot which reconstipates her. She also notes nausea in AM most days and sometimes in evening.     she specifically denies any CP, SOB, HA. Dizziness, fevers, chills, N/V/D, urinary symptoms or other bowel changes.    Current Outpatient Medications on File Prior to Visit   Medication Sig Dispense Refill    NONFORMULARY       lovastatin (MEVACOR) 40 MG tablet TAKE ONE TABLET BY MOUTH EVERY NIGHT 30 tablet 5    Semaglutide,0.25 or 0.5MG/DOS, 2 MG/1.5ML SOPN Inject 0.25 mg into the skin every 7 days Increase to 0.5 mg after 1 month 2 mL 1    carvedilol (COREG) 3.125 MG tablet TAKE ONE TABLET BY MOUTH TWICE A DAY WITH A MEAL 180 tablet 1    Continuous Blood Gluc Sensor (DEXCOM G7 SENSOR) MISC Use to monitor blood sugars. 6 each 3    Continuous Blood Gluc  (DEXCOM G7 ) SEKOU Use to monitor blood sugars. 1 each 0    lisinopril-hydroCHLOROthiazide (PRINZIDE;ZESTORETIC) 20-12.5 MG per tablet TAKE ONE TABLET BY MOUTH ONE TIME DAILY 30 tablet 5    metFORMIN (GLUCOPHAGE) 1000 MG tablet TAKE ONE 
someone who physically or mentally threatens you? N   Is it safe for you to go home? Y         ADL Screenin/14/2023     1:00 PM   ADL ASSESSMENT   Feeding yourself No Help Needed   Getting from bed to chair No Help Needed   Getting dressed No Help Needed   Bathing or showering No Help Needed   Walk across the room (includes cane/walker) No Help Needed   Using the telphone No Help Needed   Taking your medications No Help Needed   Preparing meals No Help Needed   Managing money (expenses/bills) No Help Needed   Moderately strenuous housework (laundry) No Help Needed   Shopping for personal items (toiletries/medicines) No Help Needed   Shopping for groceries No Help Needed   Driving No Help Needed   Climbing a flight of stairs No Help Needed   Getting to places beyond walking distances No Help Needed

## 2024-06-10 RX ORDER — SEMAGLUTIDE 0.68 MG/ML
0.5 INJECTION, SOLUTION SUBCUTANEOUS
Qty: 3 ML | Refills: 3 | Status: SHIPPED | OUTPATIENT
Start: 2024-06-10

## 2024-06-10 NOTE — TELEPHONE ENCOUNTER
PCP: Reagan Davison PA-C     Last appt:  5/16/2024      Future Appointments   Date Time Provider Department Center   11/18/2024  1:30 PM Reagan Davison PA-C [facility] BS AMB          Requested Prescriptions     Pending Prescriptions Disp Refills    OZEMPIC, 0.25 OR 0.5 MG/DOSE, 2 MG/3ML SOPN [Pharmacy Med Name: OZEMPIC 0.25-0.5 MG DOSE PEN[R]] 3 mL 1     Sig: INJECT 0.25 MG UNDER THE SKIN ONCE WEEKLY FOR 4 WEEKS, THEN INCREASE TO INJECT 0.5 MG UNDER THE SKIN ONCE WEEKLY

## 2024-07-01 RX ORDER — LISINOPRIL AND HYDROCHLOROTHIAZIDE 20; 12.5 MG/1; MG/1
1 TABLET ORAL DAILY
Qty: 90 TABLET | Refills: 3 | Status: SHIPPED | OUTPATIENT
Start: 2024-07-01

## 2024-07-01 NOTE — TELEPHONE ENCOUNTER
PCP: Reagan Davison PA-C     Last appt:  5/16/2024      Future Appointments   Date Time Provider Department Center   11/18/2024  1:30 PM Reagan Davison PA-C [facility] AMB          Requested Prescriptions     Pending Prescriptions Disp Refills    lisinopril-hydroCHLOROthiazide (PRINZIDE;ZESTORETIC) 20-12.5 MG per tablet [Pharmacy Med Name: LISINOPRIL-HCTZ 20-12.5 MG TAB] 30 tablet 5     Sig: TAKE ONE TABLET BY MOUTH ONE TIME DAILY

## 2024-07-01 NOTE — TELEPHONE ENCOUNTER
PCP: Reagan Davison PA-C     Last appt:  5/16/2024      Future Appointments   Date Time Provider Department Center   11/18/2024  1:30 PM Reagan Davison PA-C [facility] AMB          Requested Prescriptions     Pending Prescriptions Disp Refills    lisinopril-hydroCHLOROthiazide (PRINZIDE;ZESTORETIC) 20-12.5 MG per tablet [Pharmacy Med Name: LISINOPRIL-HCTZ 20-12.5 MG TAB] 30 tablet 5     Sig: TAKE ONE TABLET BY MOUTH ONE TIME DAILY

## 2024-07-30 DIAGNOSIS — R80.9 TYPE 2 DIABETES MELLITUS WITH MICROALBUMINURIA, WITHOUT LONG-TERM CURRENT USE OF INSULIN (HCC): ICD-10-CM

## 2024-07-30 DIAGNOSIS — E11.29 TYPE 2 DIABETES MELLITUS WITH MICROALBUMINURIA, WITHOUT LONG-TERM CURRENT USE OF INSULIN (HCC): ICD-10-CM

## 2024-07-30 DIAGNOSIS — I10 PRIMARY HYPERTENSION: ICD-10-CM

## 2024-07-31 RX ORDER — CARVEDILOL 3.12 MG/1
TABLET ORAL
Qty: 180 TABLET | Refills: 1 | Status: SHIPPED | OUTPATIENT
Start: 2024-07-31

## 2024-08-11 RX ORDER — ACYCLOVIR 400 MG/1
TABLET ORAL
Qty: 6 EACH | Refills: 1 | Status: SHIPPED | OUTPATIENT
Start: 2024-08-11

## 2024-09-29 RX ORDER — SEMAGLUTIDE 0.68 MG/ML
0.5 INJECTION, SOLUTION SUBCUTANEOUS
Qty: 3 ML | Refills: 3 | Status: CANCELLED | OUTPATIENT
Start: 2024-09-29

## 2024-09-30 RX ORDER — SEMAGLUTIDE 0.68 MG/ML
INJECTION, SOLUTION SUBCUTANEOUS
Qty: 3 ML | Refills: 3 | OUTPATIENT
Start: 2024-09-30

## 2024-09-30 RX ORDER — SEMAGLUTIDE 1.34 MG/ML
1 INJECTION, SOLUTION SUBCUTANEOUS
Qty: 4 ADJUSTABLE DOSE PRE-FILLED PEN SYRINGE | Refills: 5 | Status: SHIPPED | OUTPATIENT
Start: 2024-09-30

## 2024-09-30 NOTE — TELEPHONE ENCOUNTER
PCP: Reagan Davison PA-C     Last appt:  5/16/2024      Future Appointments   Date Time Provider Department Center   11/18/2024  1:30 PM Reagan Davison PA-C [facility] ECC DEP          Requested Prescriptions     Pending Prescriptions Disp Refills    OZEMPIC, 0.25 OR 0.5 MG/DOSE, 2 MG/3ML SOPN [Pharmacy Med Name: OZEMPIC 0.25-0.5 MG DOSE PEN[R]] 3 mL 3     Sig: INJECT 0.5MG UNDER THE SKIN EVERY WEEK    Semaglutide, 1 MG/DOSE, (OZEMPIC, 1 MG/DOSE,) 4 MG/3ML SOPN sc injection 4 Adjustable Dose Pre-filled Pen Syringe 0     Sig: Inject 1 mg into the skin every 7 days

## 2024-10-21 RX ORDER — LOVASTATIN 40 MG/1
TABLET ORAL
Qty: 30 TABLET | Refills: 5 | Status: SHIPPED | OUTPATIENT
Start: 2024-10-21

## 2024-10-21 NOTE — TELEPHONE ENCOUNTER
PCP: Reagan Davison PA-C     Last appt:  5/16/2024      Future Appointments   Date Time Provider Department Center   11/18/2024  1:30 PM Reagan Davison PA-C [facility] ECC DEP          Requested Prescriptions     Pending Prescriptions Disp Refills    lovastatin (MEVACOR) 40 MG tablet [Pharmacy Med Name: LOVASTATIN 40 MG TAB[**]] 30 tablet 5     Sig: TAKE ONE TABLET BY MOUTH ONE TIME DAILY EVERY NIGHT

## 2024-10-24 ENCOUNTER — PATIENT MESSAGE (OUTPATIENT)
Facility: CLINIC | Age: 64
End: 2024-10-24

## 2024-10-24 DIAGNOSIS — E11.29 TYPE 2 DIABETES MELLITUS WITH MICROALBUMINURIA, WITHOUT LONG-TERM CURRENT USE OF INSULIN (HCC): ICD-10-CM

## 2024-10-24 DIAGNOSIS — R80.9 TYPE 2 DIABETES MELLITUS WITH MICROALBUMINURIA, WITHOUT LONG-TERM CURRENT USE OF INSULIN (HCC): ICD-10-CM

## 2024-11-01 ENCOUNTER — PATIENT MESSAGE (OUTPATIENT)
Facility: CLINIC | Age: 64
End: 2024-11-01

## 2024-11-01 DIAGNOSIS — E78.00 HYPERCHOLESTEREMIA: ICD-10-CM

## 2024-11-01 DIAGNOSIS — I10 PRIMARY HYPERTENSION: Primary | ICD-10-CM

## 2024-11-01 DIAGNOSIS — R80.9 TYPE 2 DIABETES MELLITUS WITH MICROALBUMINURIA, WITHOUT LONG-TERM CURRENT USE OF INSULIN (HCC): ICD-10-CM

## 2024-11-01 DIAGNOSIS — E11.29 TYPE 2 DIABETES MELLITUS WITH MICROALBUMINURIA, WITHOUT LONG-TERM CURRENT USE OF INSULIN (HCC): ICD-10-CM

## 2024-11-05 RX ORDER — ACYCLOVIR 400 MG/1
TABLET ORAL
Qty: 6 EACH | Refills: 5 | Status: SHIPPED | OUTPATIENT
Start: 2024-11-05

## 2024-11-05 NOTE — TELEPHONE ENCOUNTER
PCP: Reagan Davison PA-C     Last appt:  5/16/2024      Future Appointments   Date Time Provider Department Center   11/18/2024  1:30 PM Reagan Davison PA-C [facility] ECC DEP          Requested Prescriptions     Pending Prescriptions Disp Refills    Continuous Glucose Sensor (DEXCOM G7 SENSOR) MISC [Pharmacy Med Name: DEXCOM G7 SENSOR] 6 each 1     Sig: APPLY ONE SENSOR TO THE BACK OF YOUR UPPER ARM. REPLACE EVERY 10 DAYS. USE TO MONITOR BLOOD SUGARS.

## 2024-11-08 LAB
ALBUMIN SERPL-MCNC: 4.2 G/DL (ref 3.9–4.9)
ALP SERPL-CCNC: 54 IU/L (ref 44–121)
ALT SERPL-CCNC: 7 IU/L (ref 0–32)
AST SERPL-CCNC: 14 IU/L (ref 0–40)
BASOPHILS # BLD AUTO: 0 X10E3/UL (ref 0–0.2)
BASOPHILS NFR BLD AUTO: 0 %
BILIRUB SERPL-MCNC: 0.3 MG/DL (ref 0–1.2)
BUN SERPL-MCNC: 12 MG/DL (ref 8–27)
BUN/CREAT SERPL: 11 (ref 12–28)
CALCIUM SERPL-MCNC: 9.9 MG/DL (ref 8.7–10.3)
CHLORIDE SERPL-SCNC: 106 MMOL/L (ref 96–106)
CHOLEST SERPL-MCNC: 220 MG/DL (ref 100–199)
CO2 SERPL-SCNC: 24 MMOL/L (ref 20–29)
CREAT SERPL-MCNC: 1.13 MG/DL (ref 0.57–1)
EGFRCR SERPLBLD CKD-EPI 2021: 55 ML/MIN/1.73
EOSINOPHIL # BLD AUTO: 0.1 X10E3/UL (ref 0–0.4)
EOSINOPHIL NFR BLD AUTO: 3 %
ERYTHROCYTE [DISTWIDTH] IN BLOOD BY AUTOMATED COUNT: 12.9 % (ref 11.7–15.4)
GLOBULIN SER CALC-MCNC: 2.5 G/DL (ref 1.5–4.5)
GLUCOSE SERPL-MCNC: 136 MG/DL (ref 70–99)
HBA1C MFR BLD: 6.2 % (ref 4.8–5.6)
HCT VFR BLD AUTO: 39.4 % (ref 34–46.6)
HDLC SERPL-MCNC: 32 MG/DL
HGB BLD-MCNC: 13.2 G/DL (ref 11.1–15.9)
IMM GRANULOCYTES # BLD AUTO: 0 X10E3/UL (ref 0–0.1)
IMM GRANULOCYTES NFR BLD AUTO: 0 %
LDLC SERPL CALC-MCNC: 153 MG/DL (ref 0–99)
LYMPHOCYTES # BLD AUTO: 1.7 X10E3/UL (ref 0.7–3.1)
LYMPHOCYTES NFR BLD AUTO: 32 %
MCH RBC QN AUTO: 29.5 PG (ref 26.6–33)
MCHC RBC AUTO-ENTMCNC: 33.5 G/DL (ref 31.5–35.7)
MCV RBC AUTO: 88 FL (ref 79–97)
MONOCYTES # BLD AUTO: 0.3 X10E3/UL (ref 0.1–0.9)
MONOCYTES NFR BLD AUTO: 7 %
NEUTROPHILS # BLD AUTO: 3.1 X10E3/UL (ref 1.4–7)
NEUTROPHILS NFR BLD AUTO: 58 %
PLATELET # BLD AUTO: 204 X10E3/UL (ref 150–450)
POTASSIUM SERPL-SCNC: 5 MMOL/L (ref 3.5–5.2)
PROT SERPL-MCNC: 6.7 G/DL (ref 6–8.5)
RBC # BLD AUTO: 4.47 X10E6/UL (ref 3.77–5.28)
SODIUM SERPL-SCNC: 143 MMOL/L (ref 134–144)
SPECIMEN STATUS REPORT: NORMAL
TRIGL SERPL-MCNC: 192 MG/DL (ref 0–149)
VLDLC SERPL CALC-MCNC: 35 MG/DL (ref 5–40)
WBC # BLD AUTO: 5.2 X10E3/UL (ref 3.4–10.8)

## 2024-11-18 ENCOUNTER — OFFICE VISIT (OUTPATIENT)
Facility: CLINIC | Age: 64
End: 2024-11-18
Payer: COMMERCIAL

## 2024-11-18 VITALS
SYSTOLIC BLOOD PRESSURE: 130 MMHG | DIASTOLIC BLOOD PRESSURE: 76 MMHG | RESPIRATION RATE: 16 BRPM | WEIGHT: 277.8 LBS | OXYGEN SATURATION: 97 % | HEIGHT: 68 IN | TEMPERATURE: 98.3 F | HEART RATE: 83 BPM | BODY MASS INDEX: 42.1 KG/M2

## 2024-11-18 DIAGNOSIS — Z12.4 CERVICAL CANCER SCREENING: ICD-10-CM

## 2024-11-18 DIAGNOSIS — E11.29 TYPE 2 DIABETES MELLITUS WITH MICROALBUMINURIA, WITHOUT LONG-TERM CURRENT USE OF INSULIN (HCC): ICD-10-CM

## 2024-11-18 DIAGNOSIS — E78.00 HYPERCHOLESTEREMIA: ICD-10-CM

## 2024-11-18 DIAGNOSIS — R68.89 COLD FEELING: ICD-10-CM

## 2024-11-18 DIAGNOSIS — E66.01 OBESITY, MORBID: ICD-10-CM

## 2024-11-18 DIAGNOSIS — I10 PRIMARY HYPERTENSION: Primary | ICD-10-CM

## 2024-11-18 DIAGNOSIS — R80.9 TYPE 2 DIABETES MELLITUS WITH MICROALBUMINURIA, WITHOUT LONG-TERM CURRENT USE OF INSULIN (HCC): ICD-10-CM

## 2024-11-18 PROCEDURE — 3078F DIAST BP <80 MM HG: CPT | Performed by: PHYSICIAN ASSISTANT

## 2024-11-18 PROCEDURE — 3044F HG A1C LEVEL LT 7.0%: CPT | Performed by: PHYSICIAN ASSISTANT

## 2024-11-18 PROCEDURE — 3075F SYST BP GE 130 - 139MM HG: CPT | Performed by: PHYSICIAN ASSISTANT

## 2024-11-18 PROCEDURE — 99214 OFFICE O/P EST MOD 30 MIN: CPT | Performed by: PHYSICIAN ASSISTANT

## 2024-11-18 ASSESSMENT — ENCOUNTER SYMPTOMS
RESPIRATORY NEGATIVE: 1
SHORTNESS OF BREATH: 0
BLOOD IN STOOL: 0
VOMITING: 0
ABDOMINAL PAIN: 0
EYES NEGATIVE: 1
CONSTIPATION: 0
NAUSEA: 0
DIARRHEA: 0

## 2024-11-18 NOTE — PATIENT INSTRUCTIONS
Cardiology Appointment:    [facility]  [address]  [address]  Appointment Time: 10:30am   Arrival Time:

## 2024-11-18 NOTE — PROGRESS NOTES
Kimberlee Hartley  Identified pt with two pt identifiers(name and ).     Chief Complaint   Patient presents with    Hypertension     Room 4B //     Diabetes       Reviewed record In preparation for visit and have obtained necessary documentation.     1. Have you been to the ER, urgent care clinic or hospitalized since your last visit? No     2. Have you seen or consulted any other health care providers outside of the [facility] since your last visit? Include any pap smears or colon screening. No    Patient has an advance directive.     Vitals reviewed with provider.    Health Maintenance reviewed:     Health Maintenance Due   Topic    HIV screen     DTaP/Tdap/Td vaccine (1 - Tdap)    Shingles vaccine (1 of 2)    Diabetic retinal exam     Diabetic Alb to Cr ratio (uACR) test     Flu vaccine (1)    Diabetic foot exam     COVID-19 Vaccine (2 -  season)    Cervical cancer screen           Wt Readings from Last 3 Encounters:   24 126 kg (277 lb 12.8 oz)   24 (!) 139.5 kg (307 lb 9.6 oz)   24 (!) 148.1 kg (326 lb 6.4 oz)        Temp Readings from Last 3 Encounters:   24 98.6 °F (37 °C) (Oral)   24 98.2 °F (36.8 °C) (Oral)   23 98.1 °F (36.7 °C) (Oral)        BP Readings from Last 3 Encounters:   24 130/76   24 (!) 146/78   23 (!) 156/102        Pulse Readings from Last 3 Encounters:   24 84   24 98   23 80             No data to display                  Learning Assessment:   :         5/10/2023     4:00 PM   [facility] AMB LEARNING ASSESSMENT   Primary Learner Patient   level of education GRADUATED HIGH SCHOOL OR GED   Primary Language ENGLISH   Learning Preference READING   Answered By self   Relationship to Learner SELF         Fall Risk Assessment:          No data to display                  Abuse Screenin/14/2023     1:00 PM   AMB Abuse Screening   Do you ever feel afraid of your partner? N   Are you in a relationship

## 2024-11-18 NOTE — PROGRESS NOTES
Kimberlee Hartley is a 64 y.o. year old female seen in clinic today for   Chief Complaint   Patient presents with    Hypertension     Room 4B //     Diabetes       she is here today to follow up for HTN, DM2  She is doing extremely well. Down 50+ pounds this year with use of ozempic and great dietary changes. She uses protein shakes for breakfast and lunch and eats small portion dinner with minimal snacking. Last visit we discontinued a dose of metformin and her glipizide. She reports she is still having some overnight hypoglycemia on her dexcom but only on Sunday nights. She notes she previously gave her shot on Sundays but even moved it to Saturday with no change in results.   Plan will be to start eating small snack before bed high in protein Sunday nights before bed.    She is compliant with all BP meds and reports normal readings of 120-140 at home with diastolic 70-80, has some white coat HTN.     She had labs done prior and they are reviewed. She uses lovastatin 40 mg daily and had statin myalgias with crestor. Lipids not at goal for diabetic.     she specifically denies any CP, SOB, HA. Dizziness, fevers, chills, N/V/D, urinary symptoms or other bowel changes.    Current Outpatient Medications on File Prior to Visit   Medication Sig Dispense Refill    Continuous Glucose Sensor (DEXCOM G7 SENSOR) MISC APPLY ONE SENSOR TO THE BACK OF YOUR UPPER ARM. REPLACE EVERY 10 DAYS. USE TO MONITOR BLOOD SUGARS. 6 each 5    metFORMIN (GLUCOPHAGE) 1000 MG tablet Take 1 tablet by mouth daily (with breakfast) 90 tablet 3    lovastatin (MEVACOR) 40 MG tablet TAKE ONE TABLET BY MOUTH ONE TIME DAILY EVERY NIGHT 30 tablet 5    Semaglutide, 1 MG/DOSE, (OZEMPIC, 1 MG/DOSE,) 4 MG/3ML SOPN sc injection Inject 1 mg into the skin every 7 days 4 Adjustable Dose Pre-filled Pen Syringe 5    carvedilol (COREG) 3.125 MG tablet TAKE ONE TABLET BY MOUTH TWICE A DAY WITH A MEAL 180 tablet 1    lisinopril-hydroCHLOROthiazide (PRINZIDE;ZESTORETIC)

## 2025-02-06 DIAGNOSIS — I10 PRIMARY HYPERTENSION: ICD-10-CM

## 2025-02-06 RX ORDER — CARVEDILOL 3.12 MG/1
TABLET ORAL
Qty: 180 TABLET | Refills: 0 | Status: SHIPPED | OUTPATIENT
Start: 2025-02-06

## 2025-02-06 NOTE — TELEPHONE ENCOUNTER
PCP: Reagan Davison PA-C     Last appt:  11/18/2024      Future Appointments   Date Time Provider Department Center   5/19/2025  1:00 PM Reagan Davison PA-C [facility] ECC DEP          Requested Prescriptions     Pending Prescriptions Disp Refills    carvedilol (COREG) 3.125 MG tablet [Pharmacy Med Name: CARVEDILOL 3.125 MG TAB[*]] 180 tablet 1     Sig: TAKE ONE TABLET BY MOUTH TWICE A DAY WITH A MEAL

## 2025-03-17 RX ORDER — SEMAGLUTIDE 1.34 MG/ML
INJECTION, SOLUTION SUBCUTANEOUS
Qty: 3 ADJUSTABLE DOSE PRE-FILLED PEN SYRINGE | Refills: 5 | Status: SHIPPED | OUTPATIENT
Start: 2025-03-17

## 2025-03-17 NOTE — TELEPHONE ENCOUNTER
Future Appointments:  Future Appointments   Date Time Provider Department Center   5/19/2025  1:00 PM Reagan Davison PA-C [facility] ECC DEP        Last Appointment With Me:  11/18/2024     Requested Prescriptions     Pending Prescriptions Disp Refills    OZEMPIC, 1 MG/DOSE, 4 MG/3ML SOPN sc injection [Pharmacy Med Name: OZEMPIC 1 MG DOSE PEN (4MG/3ML)[R^]]  5     Sig: INJECT 1MG UNDER THE SKIN EVERY WEEK

## 2025-03-31 RX ORDER — LOVASTATIN 40 MG/1
TABLET ORAL
Qty: 30 TABLET | Refills: 5 | Status: SHIPPED | OUTPATIENT
Start: 2025-03-31

## 2025-03-31 NOTE — TELEPHONE ENCOUNTER
Future Appointments:  Future Appointments   Date Time Provider Department Center   5/19/2025  1:00 PM Reagan Davison PA-C [facility] ECC DEP        Last Appointment With Me:  11/18/2024     Requested Prescriptions     Pending Prescriptions Disp Refills    lovastatin (MEVACOR) 40 MG tablet [Pharmacy Med Name: LOVASTATIN 40 MG TAB] 30 tablet 5     Sig: TAKE ONE TABLET BY MOUTH ONE TIME DAILY EVERY NIGHT

## 2025-04-08 ENCOUNTER — PATIENT MESSAGE (OUTPATIENT)
Facility: CLINIC | Age: 65
End: 2025-04-08

## 2025-04-08 DIAGNOSIS — E78.00 HYPERCHOLESTEREMIA: ICD-10-CM

## 2025-04-08 DIAGNOSIS — R80.9 TYPE 2 DIABETES MELLITUS WITH MICROALBUMINURIA, WITHOUT LONG-TERM CURRENT USE OF INSULIN (HCC): ICD-10-CM

## 2025-04-08 DIAGNOSIS — E11.29 TYPE 2 DIABETES MELLITUS WITH MICROALBUMINURIA, WITHOUT LONG-TERM CURRENT USE OF INSULIN (HCC): ICD-10-CM

## 2025-04-08 DIAGNOSIS — I10 PRIMARY HYPERTENSION: Primary | ICD-10-CM

## 2025-05-01 DIAGNOSIS — E11.29 TYPE 2 DIABETES MELLITUS WITH MICROALBUMINURIA, WITHOUT LONG-TERM CURRENT USE OF INSULIN (HCC): ICD-10-CM

## 2025-05-01 DIAGNOSIS — E78.00 HYPERCHOLESTEREMIA: ICD-10-CM

## 2025-05-01 DIAGNOSIS — R80.9 TYPE 2 DIABETES MELLITUS WITH MICROALBUMINURIA, WITHOUT LONG-TERM CURRENT USE OF INSULIN (HCC): ICD-10-CM

## 2025-05-11 LAB
ALBUMIN SERPL-MCNC: 4.3 G/DL (ref 3.9–4.9)
ALP SERPL-CCNC: 62 IU/L (ref 44–121)
ALT SERPL-CCNC: 7 IU/L (ref 0–32)
AST SERPL-CCNC: 10 IU/L (ref 0–40)
BASOPHILS # BLD AUTO: 0 X10E3/UL (ref 0–0.2)
BASOPHILS NFR BLD AUTO: 1 %
BILIRUB SERPL-MCNC: 0.3 MG/DL (ref 0–1.2)
BUN SERPL-MCNC: 24 MG/DL (ref 8–27)
BUN/CREAT SERPL: 21 (ref 12–28)
CALCIUM SERPL-MCNC: 10 MG/DL (ref 8.7–10.3)
CHLORIDE SERPL-SCNC: 104 MMOL/L (ref 96–106)
CHOLEST SERPL-MCNC: 183 MG/DL (ref 100–199)
CO2 SERPL-SCNC: 23 MMOL/L (ref 20–29)
CREAT SERPL-MCNC: 1.16 MG/DL (ref 0.57–1)
EGFRCR SERPLBLD CKD-EPI 2021: 53 ML/MIN/1.73
EOSINOPHIL # BLD AUTO: 0.1 X10E3/UL (ref 0–0.4)
EOSINOPHIL NFR BLD AUTO: 2 %
ERYTHROCYTE [DISTWIDTH] IN BLOOD BY AUTOMATED COUNT: 13.3 % (ref 11.7–15.4)
GLOBULIN SER CALC-MCNC: 2.3 G/DL (ref 1.5–4.5)
GLUCOSE SERPL-MCNC: 113 MG/DL (ref 70–99)
HCT VFR BLD AUTO: 39 % (ref 34–46.6)
HDLC SERPL-MCNC: 30 MG/DL
HGB BLD-MCNC: 12.6 G/DL (ref 11.1–15.9)
IMM GRANULOCYTES # BLD AUTO: 0 X10E3/UL (ref 0–0.1)
IMM GRANULOCYTES NFR BLD AUTO: 0 %
LDLC SERPL CALC-MCNC: 132 MG/DL (ref 0–99)
LYMPHOCYTES # BLD AUTO: 1.8 X10E3/UL (ref 0.7–3.1)
LYMPHOCYTES NFR BLD AUTO: 37 %
MCH RBC QN AUTO: 28.6 PG (ref 26.6–33)
MCHC RBC AUTO-ENTMCNC: 32.3 G/DL (ref 31.5–35.7)
MCV RBC AUTO: 89 FL (ref 79–97)
MONOCYTES # BLD AUTO: 0.3 X10E3/UL (ref 0.1–0.9)
MONOCYTES NFR BLD AUTO: 6 %
NEUTROPHILS # BLD AUTO: 2.6 X10E3/UL (ref 1.4–7)
NEUTROPHILS NFR BLD AUTO: 54 %
PLATELET # BLD AUTO: 215 X10E3/UL (ref 150–450)
POTASSIUM SERPL-SCNC: 5.6 MMOL/L (ref 3.5–5.2)
PROT SERPL-MCNC: 6.6 G/DL (ref 6–8.5)
RBC # BLD AUTO: 4.4 X10E6/UL (ref 3.77–5.28)
SODIUM SERPL-SCNC: 140 MMOL/L (ref 134–144)
TRIGL SERPL-MCNC: 112 MG/DL (ref 0–149)
VLDLC SERPL CALC-MCNC: 21 MG/DL (ref 5–40)
WBC # BLD AUTO: 4.7 X10E3/UL (ref 3.4–10.8)

## 2025-05-12 LAB — HBA1C MFR BLD: 5.9 % (ref 4.8–5.6)

## 2025-05-19 ENCOUNTER — OFFICE VISIT (OUTPATIENT)
Facility: CLINIC | Age: 65
End: 2025-05-19
Payer: COMMERCIAL

## 2025-05-19 VITALS
HEART RATE: 82 BPM | DIASTOLIC BLOOD PRESSURE: 81 MMHG | TEMPERATURE: 98.3 F | BODY MASS INDEX: 40.07 KG/M2 | OXYGEN SATURATION: 98 % | HEIGHT: 68 IN | WEIGHT: 264.4 LBS | RESPIRATION RATE: 16 BRPM | SYSTOLIC BLOOD PRESSURE: 136 MMHG

## 2025-05-19 DIAGNOSIS — E66.01 OBESITY, MORBID (HCC): ICD-10-CM

## 2025-05-19 DIAGNOSIS — Z23 NEED FOR SHINGLES VACCINE: ICD-10-CM

## 2025-05-19 DIAGNOSIS — Z12.31 ENCOUNTER FOR SCREENING MAMMOGRAM FOR MALIGNANT NEOPLASM OF BREAST: ICD-10-CM

## 2025-05-19 DIAGNOSIS — Z12.4 CERVICAL CANCER SCREENING: ICD-10-CM

## 2025-05-19 DIAGNOSIS — I10 PRIMARY HYPERTENSION: Primary | ICD-10-CM

## 2025-05-19 DIAGNOSIS — R80.9 TYPE 2 DIABETES MELLITUS WITH MICROALBUMINURIA, WITHOUT LONG-TERM CURRENT USE OF INSULIN (HCC): ICD-10-CM

## 2025-05-19 DIAGNOSIS — N28.9 ABNORMAL KIDNEY FUNCTION: ICD-10-CM

## 2025-05-19 DIAGNOSIS — E78.00 HYPERCHOLESTEREMIA: ICD-10-CM

## 2025-05-19 DIAGNOSIS — E11.29 TYPE 2 DIABETES MELLITUS WITH MICROALBUMINURIA, WITHOUT LONG-TERM CURRENT USE OF INSULIN (HCC): ICD-10-CM

## 2025-05-19 DIAGNOSIS — E87.5 HYPERKALEMIA: ICD-10-CM

## 2025-05-19 DIAGNOSIS — F51.01 PRIMARY INSOMNIA: ICD-10-CM

## 2025-05-19 PROCEDURE — 99214 OFFICE O/P EST MOD 30 MIN: CPT | Performed by: PHYSICIAN ASSISTANT

## 2025-05-19 PROCEDURE — 90750 HZV VACC RECOMBINANT IM: CPT | Performed by: PHYSICIAN ASSISTANT

## 2025-05-19 PROCEDURE — 90471 IMMUNIZATION ADMIN: CPT | Performed by: PHYSICIAN ASSISTANT

## 2025-05-19 PROCEDURE — 3075F SYST BP GE 130 - 139MM HG: CPT | Performed by: PHYSICIAN ASSISTANT

## 2025-05-19 PROCEDURE — 3079F DIAST BP 80-89 MM HG: CPT | Performed by: PHYSICIAN ASSISTANT

## 2025-05-19 PROCEDURE — 3044F HG A1C LEVEL LT 7.0%: CPT | Performed by: PHYSICIAN ASSISTANT

## 2025-05-19 RX ORDER — TRAZODONE HYDROCHLORIDE 50 MG/1
50 TABLET ORAL NIGHTLY PRN
Qty: 30 TABLET | Refills: 0 | Status: SHIPPED | OUTPATIENT
Start: 2025-05-19 | End: 2025-05-20 | Stop reason: SDUPTHER

## 2025-05-19 SDOH — ECONOMIC STABILITY: FOOD INSECURITY: WITHIN THE PAST 12 MONTHS, THE FOOD YOU BOUGHT JUST DIDN'T LAST AND YOU DIDN'T HAVE MONEY TO GET MORE.: NEVER TRUE

## 2025-05-19 SDOH — ECONOMIC STABILITY: FOOD INSECURITY: WITHIN THE PAST 12 MONTHS, YOU WORRIED THAT YOUR FOOD WOULD RUN OUT BEFORE YOU GOT MONEY TO BUY MORE.: NEVER TRUE

## 2025-05-19 ASSESSMENT — ENCOUNTER SYMPTOMS
VOMITING: 0
SHORTNESS OF BREATH: 0
BLOOD IN STOOL: 0
DIARRHEA: 0
NAUSEA: 0
RESPIRATORY NEGATIVE: 1
EYES NEGATIVE: 1
CONSTIPATION: 0
ABDOMINAL PAIN: 0

## 2025-05-19 ASSESSMENT — PATIENT HEALTH QUESTIONNAIRE - PHQ9
SUM OF ALL RESPONSES TO PHQ QUESTIONS 1-9: 0
2. FEELING DOWN, DEPRESSED OR HOPELESS: NOT AT ALL
SUM OF ALL RESPONSES TO PHQ QUESTIONS 1-9: 0
SUM OF ALL RESPONSES TO PHQ QUESTIONS 1-9: 0
1. LITTLE INTEREST OR PLEASURE IN DOING THINGS: NOT AT ALL
SUM OF ALL RESPONSES TO PHQ QUESTIONS 1-9: 0

## 2025-05-19 NOTE — PROGRESS NOTES
1:00 PM   AMB Abuse Screening   Do you ever feel afraid of your partner? N   Are you in a relationship with someone who physically or mentally threatens you? N   Is it safe for you to go home? Y         ADL Screenin/14/2023     1:00 PM   ADL ASSESSMENT   Feeding yourself No Help Needed   Getting from bed to chair No Help Needed   Getting dressed No Help Needed   Bathing or showering No Help Needed   Walk across the room (includes cane/walker) No Help Needed   Using the telphone No Help Needed   Taking your medications No Help Needed   Preparing meals No Help Needed   Managing money (expenses/bills) No Help Needed   Moderately strenuous housework (laundry) No Help Needed   Shopping for personal items (toiletries/medicines) No Help Needed   Shopping for groceries No Help Needed   Driving No Help Needed   Climbing a flight of stairs No Help Needed   Getting to places beyond walking distances No Help Needed      
sounds: Normal heart sounds. No murmur heard.  Pulmonary:      Effort: Pulmonary effort is normal.      Breath sounds: Normal breath sounds. No wheezing, rhonchi or rales.   Musculoskeletal:      Cervical back: Normal range of motion and neck supple. No rigidity.      Right lower leg: No edema.      Left lower leg: No edema.   Lymphadenopathy:      Cervical: No cervical adenopathy.   Skin:     Capillary Refill: Capillary refill takes less than 2 seconds.      Findings: No rash.   Neurological:      General: No focal deficit present.      Mental Status: She is alert and oriented to person, place, and time. Mental status is at baseline.      Sensory: No sensory deficit.      Motor: No weakness.      Gait: Gait normal.   Psychiatric:         Mood and Affect: Mood normal.         Behavior: Behavior normal.         Thought Content: Thought content normal.          No results found for this or any previous visit (from the past 24 hours).     ASSESSMENT AND PLAN  Kimberlee was seen today for hypertension, cholesterol problem and diabetes.    Diagnoses and all orders for this visit:    Primary hypertension  At goal  Type 2 diabetes mellitus with microalbuminuria, without long-term current use of insulin (HCC)  -     metFORMIN (GLUCOPHAGE) 1000 MG tablet; Take 1 tablet by mouth daily (with breakfast)  At goal  Obesity, morbid (HCC)  Improving. Increase ozempic to 2 mg when ready, monitor constipation  Hypercholesteremia  Improving on lovastatin  Cervical cancer screening  -     [facility] - Ciarra Goldman MD, Ob-Gyn, [place]    Encounter for screening mammogram for malignant neoplasm of breast  -     AFL - Ciarra Goldman MD, Ob-Gyn, [place]    Hyperkalemia  -     Basic Metabolic Panel; Future  -     Basic Metabolic Panel  recheck  Primary insomnia  Start trazodone, discussed relationship of benadryl to memory issues  Abnormal kidney function  -     Basic Metabolic Panel; Future  -     Albumin/Creatinine

## 2025-05-19 NOTE — PATIENT INSTRUCTIONS
Patient Education        Recombinant Zoster (Shingles) Vaccine: What You Need to Know  Why get vaccinated?  Recombinant zoster (shingles) vaccine can prevent shingles.  Shingles (also called herpes zoster, or just zoster) is a painful skin rash, usually with blisters. In addition to the rash, shingles can cause fever, headache, chills, or upset stomach. Rarely, shingles can lead to complications such as pneumonia, hearing problems, blindness, brain inflammation (encephalitis), or death.  The risk of shingles increases with age. The most common complication of shingles is long-term nerve pain called postherpetic neuralgia (PHN). PHN occurs in the areas where the shingles rash was and can last for months or years after the rash goes away. The pain from PHN can be severe and debilitating.  The risk of PHN increases with age. An older adult with shingles is more likely to develop PHN and have longer lasting and more severe pain than a younger person.  People with weakened immune systems also have a higher risk of getting shingles and complications from the disease.  Shingles is caused by varicella-zoster virus, the same virus that causes chickenpox. After you have chickenpox, the virus stays in your body and can cause shingles later in life. Shingles cannot be passed from one person to another, but the virus that causes shingles can spread and cause chickenpox in someone who has never had chickenpox or has never received chickenpox vaccine.  Recombinant shingles vaccine  Recombinant shingles vaccine provides strong protection against shingles. By preventing shingles, recombinant shingles vaccine also protects against PHN and other complications.  Recombinant shingles vaccine is recommended for:  Adults 50 years and older  Adults 19 years and older who have a weakened immune system because of disease or treatments  Shingles vaccine is given as a two-dose series. For most people, the second dose should be given 2 to 6

## 2025-05-20 ENCOUNTER — PATIENT MESSAGE (OUTPATIENT)
Facility: CLINIC | Age: 65
End: 2025-05-20

## 2025-05-20 ENCOUNTER — RESULTS FOLLOW-UP (OUTPATIENT)
Facility: CLINIC | Age: 65
End: 2025-05-20

## 2025-05-20 DIAGNOSIS — N18.31 STAGE 3A CHRONIC KIDNEY DISEASE (HCC): Primary | ICD-10-CM

## 2025-05-20 LAB
ANION GAP SERPL CALC-SCNC: 3 MMOL/L (ref 2–12)
APPEARANCE UR: CLEAR
BACTERIA URNS QL MICRO: NEGATIVE /HPF
BILIRUB UR QL: NEGATIVE
BUN SERPL-MCNC: 22 MG/DL (ref 6–20)
BUN/CREAT SERPL: 19 (ref 12–20)
CALCIUM SERPL-MCNC: 9.6 MG/DL (ref 8.5–10.1)
CHLORIDE SERPL-SCNC: 103 MMOL/L (ref 97–108)
CO2 SERPL-SCNC: 30 MMOL/L (ref 21–32)
COLOR UR: ABNORMAL
CREAT SERPL-MCNC: 1.14 MG/DL (ref 0.55–1.02)
CREAT UR-MCNC: 33.2 MG/DL
EPITH CASTS URNS QL MICRO: ABNORMAL /LPF
GLUCOSE SERPL-MCNC: 105 MG/DL (ref 65–100)
GLUCOSE UR STRIP.AUTO-MCNC: NEGATIVE MG/DL
HGB UR QL STRIP: NEGATIVE
HYALINE CASTS URNS QL MICRO: ABNORMAL /LPF (ref 0–5)
KETONES UR QL STRIP.AUTO: NEGATIVE MG/DL
LEUKOCYTE ESTERASE UR QL STRIP.AUTO: ABNORMAL
MICROALBUMIN UR-MCNC: <0.5 MG/DL
MICROALBUMIN/CREAT UR-RTO: <15 MG/G (ref 0–30)
NITRITE UR QL STRIP.AUTO: NEGATIVE
PH UR STRIP: 6.5 (ref 5–8)
POTASSIUM SERPL-SCNC: 4.3 MMOL/L (ref 3.5–5.1)
PROT UR STRIP-MCNC: NEGATIVE MG/DL
RBC #/AREA URNS HPF: ABNORMAL /HPF (ref 0–5)
SODIUM SERPL-SCNC: 136 MMOL/L (ref 136–145)
SP GR UR REFRACTOMETRY: 1.01 (ref 1–1.03)
SPECIMEN HOLD: NORMAL
UROBILINOGEN UR QL STRIP.AUTO: 0.2 EU/DL (ref 0.2–1)
WBC URNS QL MICRO: ABNORMAL /HPF (ref 0–4)

## 2025-05-20 RX ORDER — TRAZODONE HYDROCHLORIDE 50 MG/1
50 TABLET ORAL NIGHTLY PRN
Qty: 30 TABLET | Refills: 2 | Status: SHIPPED | OUTPATIENT
Start: 2025-05-20

## 2025-06-09 ENCOUNTER — HOSPITAL ENCOUNTER (OUTPATIENT)
Facility: HOSPITAL | Age: 65
Discharge: HOME | End: 2025-06-12
Payer: COMMERCIAL

## 2025-06-09 DIAGNOSIS — N18.31 STAGE 3A CHRONIC KIDNEY DISEASE (HCC): ICD-10-CM

## 2025-06-09 DIAGNOSIS — E11.29 TYPE 2 DIABETES MELLITUS WITH MICROALBUMINURIA, WITHOUT LONG-TERM CURRENT USE OF INSULIN (HCC): Primary | ICD-10-CM

## 2025-06-09 DIAGNOSIS — R80.9 TYPE 2 DIABETES MELLITUS WITH MICROALBUMINURIA, WITHOUT LONG-TERM CURRENT USE OF INSULIN (HCC): Primary | ICD-10-CM

## 2025-06-09 PROCEDURE — 76770 US EXAM ABDO BACK WALL COMP: CPT

## 2025-06-09 RX ORDER — SEMAGLUTIDE 1.34 MG/ML
INJECTION, SOLUTION SUBCUTANEOUS
Qty: 3 ADJUSTABLE DOSE PRE-FILLED PEN SYRINGE | Refills: 5 | Status: CANCELLED | OUTPATIENT
Start: 2025-06-09

## 2025-06-16 ENCOUNTER — RESULTS FOLLOW-UP (OUTPATIENT)
Facility: CLINIC | Age: 65
End: 2025-06-16

## 2025-06-19 RX ORDER — TRAZODONE HYDROCHLORIDE 50 MG/1
50 TABLET ORAL NIGHTLY PRN
Qty: 90 TABLET | Refills: 3 | Status: SHIPPED | OUTPATIENT
Start: 2025-06-19

## 2025-06-19 NOTE — TELEPHONE ENCOUNTER
PCP: Reagan Davison PA-C     Last appt:  5/19/2025      Future Appointments   Date Time Provider Department Center   11/21/2025 11:00 AM Reagan Davison PA-C [facility] ECC DEP          Requested Prescriptions     Pending Prescriptions Disp Refills    traZODone (DESYREL) 50 MG tablet 30 tablet 2     Sig: Take 1 tablet by mouth nightly as needed for Sleep       Problem: Comfort Care (End of Life)  Goal: Achieves acceptable level of comfort based on pain behaviors  Outcome: Outcome Met, Continue evaluating goal progress toward completion  Patient resting in bed, appears comfortable. Will continue to monitor.

## 2025-08-03 DIAGNOSIS — R80.9 TYPE 2 DIABETES MELLITUS WITH MICROALBUMINURIA, WITHOUT LONG-TERM CURRENT USE OF INSULIN (HCC): ICD-10-CM

## 2025-08-03 DIAGNOSIS — E11.29 TYPE 2 DIABETES MELLITUS WITH MICROALBUMINURIA, WITHOUT LONG-TERM CURRENT USE OF INSULIN (HCC): ICD-10-CM

## 2025-08-04 RX ORDER — SEMAGLUTIDE 2.68 MG/ML
INJECTION, SOLUTION SUBCUTANEOUS
Qty: 3 ML | Refills: 5 | Status: SHIPPED | OUTPATIENT
Start: 2025-08-04